# Patient Record
Sex: FEMALE | URBAN - METROPOLITAN AREA
[De-identification: names, ages, dates, MRNs, and addresses within clinical notes are randomized per-mention and may not be internally consistent; named-entity substitution may affect disease eponyms.]

---

## 2017-03-31 ENCOUNTER — TELEPHONE (OUTPATIENT)
Dept: FAMILY MEDICINE CLINIC | Facility: CLINIC | Age: 72
End: 2017-03-31

## 2017-03-31 NOTE — TELEPHONE ENCOUNTER
[address] states that she had [facility] paperwork completed in the past to have available for intermittent leave for as needed use since Destiny Lopez became ill. [address] states she has old completed forms that she will drop off for MD review.  Pt informed, -copy of previou

## 2020-05-12 ENCOUNTER — HOSPITAL ENCOUNTER (EMERGENCY)
Age: 75
Discharge: HOME | End: 2020-05-12
Attending: EMERGENCY MEDICINE
Payer: MEDICARE

## 2020-05-12 ENCOUNTER — APPOINTMENT (OUTPATIENT)
Dept: CT IMAGING | Age: 75
End: 2020-05-12
Payer: MEDICARE

## 2020-05-12 VITALS
HEIGHT: 66 IN | DIASTOLIC BLOOD PRESSURE: 78 MMHG | WEIGHT: 193 LBS | TEMPERATURE: 97.9 F | OXYGEN SATURATION: 100 % | BODY MASS INDEX: 31.02 KG/M2 | SYSTOLIC BLOOD PRESSURE: 169 MMHG | HEART RATE: 63 BPM | RESPIRATION RATE: 15 BRPM

## 2020-05-12 PROCEDURE — 99284 EMERGENCY DEPT VISIT MOD MDM: CPT

## 2020-05-12 PROCEDURE — 72125 CT NECK SPINE W/O DYE: CPT

## 2020-05-12 PROCEDURE — 6370000000 HC RX 637 (ALT 250 FOR IP): Performed by: EMERGENCY MEDICINE

## 2020-05-12 PROCEDURE — 70450 CT HEAD/BRAIN W/O DYE: CPT

## 2020-05-12 RX ORDER — BUPROPION HYDROCHLORIDE 100 MG/1
100 TABLET ORAL DAILY
COMMUNITY

## 2020-05-12 RX ORDER — FLUOXETINE 10 MG/1
1 CAPSULE ORAL DAILY
COMMUNITY

## 2020-05-12 RX ORDER — ACETAMINOPHEN 325 MG/1
650 TABLET ORAL ONCE
Status: COMPLETED | OUTPATIENT
Start: 2020-05-12 | End: 2020-05-12

## 2020-05-12 RX ORDER — PANTOPRAZOLE SODIUM 20 MG/1
40 TABLET, DELAYED RELEASE ORAL DAILY
COMMUNITY
End: 2022-09-07

## 2020-05-12 RX ORDER — BUSPIRONE HYDROCHLORIDE 10 MG/1
5 TABLET ORAL DAILY
COMMUNITY
Start: 2019-06-18 | End: 2022-09-07

## 2020-05-12 RX ORDER — MULTIVIT WITH MINERALS/LUTEIN
1 TABLET ORAL DAILY
COMMUNITY

## 2020-05-12 RX ADMIN — ACETAMINOPHEN 650 MG: 325 TABLET ORAL at 14:45

## 2020-05-12 ASSESSMENT — PAIN DESCRIPTION - FREQUENCY: FREQUENCY: CONTINUOUS

## 2020-05-12 ASSESSMENT — PAIN DESCRIPTION - LOCATION: LOCATION: NECK

## 2020-05-12 ASSESSMENT — PAIN DESCRIPTION - PROGRESSION: CLINICAL_PROGRESSION: GRADUALLY WORSENING

## 2020-05-12 ASSESSMENT — PAIN SCALES - GENERAL
PAINLEVEL_OUTOF10: 5
PAINLEVEL_OUTOF10: 9
PAINLEVEL_OUTOF10: 9

## 2020-05-12 ASSESSMENT — PAIN DESCRIPTION - DESCRIPTORS: DESCRIPTORS: SHOOTING;CONSTANT

## 2020-05-12 ASSESSMENT — PAIN DESCRIPTION - ONSET: ONSET: SUDDEN

## 2020-05-12 ASSESSMENT — PAIN DESCRIPTION - PAIN TYPE: TYPE: ACUTE PAIN

## 2020-05-12 NOTE — ED NOTES
Bed: 07  Expected date: 5/12/20  Expected time: 12:57 PM  Means of arrival: [facility]  Comments:  [facility] [address], [address], RN  05/12/20 5626

## 2020-05-12 NOTE — ED NOTES
Patient assisted to and from bathroom, did well ambulating. Patient assisted back to bed. Waiting for son to arrive to  patient.       Anstedmike Merchant, HILARY  05/12/20 4001

## 2020-05-12 NOTE — ED NOTES

## 2020-05-12 NOTE — ED NOTES
Patient assisted to get dressed. Verbal and written discharge instructions given. Telemetry monitor removed. Patient awaiting ride.       Memo Hankins RN  05/12/20 1141

## 2020-05-12 NOTE — ED PROVIDER NOTES
Take 5 mg by mouth daily    CALCIUM CARBONATE-VITAMIN D3 600-400 MG-UNIT TABS    Take 1 capsule by mouth daily    FLUOXETINE (PROZAC) 10 MG CAPSULE    Take 1 capsule by mouth daily    PANTOPRAZOLE (PROTONIX) 20 MG TABLET    Take 40 mg by mouth daily    VITAMIN E (VITAMIN E) 1000 UNITS CAPSULE    Take 1 capsule by mouth daily       ALLERGIES     Cefaclor; Ibuprofen; and Sulfa antibiotics    FAMILY HISTORY     History reviewed. No pertinent family history. SOCIAL HISTORY       Social History     Socioeconomic History    Marital status: Unknown     Spouse name: None    Number of children: None    Years of education: None    Highest education level: None   Occupational History    None   Social Needs    Financial resource strain: None    Food insecurity     Worry: None     Inability: None    Transportation needs     Medical: None     Non-medical: None   Tobacco Use    Smoking status: Never Smoker    Smokeless tobacco: Never Used   Substance and Sexual Activity    Alcohol use:  Yes     Alcohol/week: 3.0 standard drinks     Types: 3 Glasses of wine per week    Drug use: Never    Sexual activity: Not Currently   Lifestyle    Physical activity     Days per week: None     Minutes per session: None    Stress: None   Relationships    Social connections     Talks on phone: None     Gets together: None     Attends Pentecostalism service: None     Active member of club or organization: None     Attends meetings of clubs or organizations: None     Relationship status: None    Intimate partner violence     Fear of current or ex partner: None     Emotionally abused: None     Physically abused: None     Forced sexual activity: None   Other Topics Concern    None   Social History Narrative    None       SCREENINGS               PHYSICAL EXAM    (up to 7 for level 4, 8 or more for level 5)     ED Triage Vitals [05/12/20 1315]   BP Temp Temp Source Pulse Resp SpO2 Height Weight   (!) 187/80 97.7 °F (36.5 °C) Oral 67 14 the cervical spine. Imaging of the head is negative but imaging of the cervical spine showed a C1 and C2 nondisplaced fracture. Patient has no neurologic compromise. No pain in any other site. Otherwise lower mechanism injury with just a standing fall. No loss of consciousness. I spoke with neurosurgery who felt the patient could be treated as an outpatient. Patient was placed in a proper cervical collar here will be discharged with outpatient neurosurgically evaluation    MDM    CONSULTS     IP CONSULT TO NEUROSURGERY    Critical Care:   None    REASSESSMENT          PROCEDURE     Unless otherwise noted below, none     Procedures      FINAL IMPRESSION      1. Closed nondisplaced fracture of first cervical vertebra, unspecified fracture morphology, initial encounter ([facility] [address]    2. Closed nondisplaced odontoid fracture with type II morphology, initial encounter [facility])            DISPOSITION/PLAN   DISPOSITION Discharge - Pending Orders Complete 05/12/2020 04:35:27 PM        PATIENT REFERRED TO:  Kimber Branham MD  [address] [phone]    Schedule an appointment as soon as possible for a visit   call immediately for Neurosurgical follow up    Tom Goldman MD  [address] [place] [address] Dr Krista Wan [id number] [phone]    Schedule an appointment as soon as possible for a visit         DISCHARGE MEDICATIONS:  New Prescriptions    No medications on file     Controlled Substances Monitoring:     No flowsheet data found.     (Please note that portions of this note were completed with a voice recognition program.  Efforts were made to edit the dictations but occasionally words are mis-transcribed.)    Alli Vasquez MD (electronically signed)  Attending Emergency Physician            Estelle Edwards MD  05/12/20 1978 Enedina Cunningham MD  05/12/20 2966

## 2021-03-01 ENCOUNTER — HOSPITAL ENCOUNTER (EMERGENCY)
Age: 76
Discharge: HOME | End: 2021-03-01
Attending: EMERGENCY MEDICINE
Payer: MEDICARE

## 2021-03-01 ENCOUNTER — APPOINTMENT (OUTPATIENT)
Dept: GENERAL RADIOLOGY | Age: 76
End: 2021-03-01
Payer: MEDICARE

## 2021-03-01 VITALS
HEART RATE: 72 BPM | BODY MASS INDEX: 29.49 KG/M2 | TEMPERATURE: 98 F | WEIGHT: 177 LBS | RESPIRATION RATE: 16 BRPM | SYSTOLIC BLOOD PRESSURE: 171 MMHG | OXYGEN SATURATION: 98 % | HEIGHT: 65 IN | DIASTOLIC BLOOD PRESSURE: 85 MMHG

## 2021-03-01 DIAGNOSIS — M79.641 RIGHT HAND PAIN: Primary | ICD-10-CM

## 2021-03-01 PROCEDURE — 99283 EMERGENCY DEPT VISIT LOW MDM: CPT

## 2021-03-01 PROCEDURE — 73130 X-RAY EXAM OF HAND: CPT

## 2021-03-01 ASSESSMENT — PAIN SCALES - GENERAL: PAINLEVEL_OUTOF10: 1

## 2021-03-01 ASSESSMENT — PAIN DESCRIPTION - ORIENTATION: ORIENTATION: RIGHT

## 2021-03-01 NOTE — ED PROVIDER NOTES
[facility] Emergency Department    CHIEF COMPLAINT  Fall (pt tripped and fell walking her dog on Saturday. pt c/o right hand pain. denies any other injuries) and Hand Pain      SHARED SERVICE VISIT:  I have seen and evaluated this patient with my supervising physician, Dr. Sandy Scott. HISTORY OF PRESENT ILLNESS  Dwight Bradford is a 70 y.o. female who is right-handed dominant who presents to the ED complaining of \"aching\" 5/10 right hand pain status post she experienced a mechanical trip and fall while walking her dog on Saturday. Denies head injury, loss of consciousness, numbness or tingling in the right upper extremity, or any prodrome including chest pain, shortness of breath, dizziness, presyncope, nausea, vomiting or sweats. She is able to get up off the ground and ambulate back home. No other complaints, modifying factors or associated symptoms. Nursing notes reviewed. Past Medical History:   Diagnosis Date    Depression     GERD (gastroesophageal reflux disease)      Past Surgical History:   Procedure Laterality Date    STOMACH SURGERY      TONSILLECTOMY       No family history on file. Social History     Socioeconomic History    Marital status:      Spouse name: Not on file    Number of children: Not on file    Years of education: Not on file    Highest education level: Not on file   Occupational History    Not on file   Social Needs    Financial resource strain: Not on file    Food insecurity     Worry: Not on file     Inability: Not on file    Transportation needs     Medical: Not on file     Non-medical: Not on file   Tobacco Use    Smoking status: Never Smoker    Smokeless tobacco: Never Used   Substance and Sexual Activity    Alcohol use:  Yes     Alcohol/week: 3.0 standard drinks     Types: 3 Glasses of wine per week    Drug use: Never    Sexual activity: Not Currently   Lifestyle    Physical activity     Days per week: Not on file Minutes per session: Not on file    Stress: Not on file   Relationships    Social connections     Talks on phone: Not on file     Gets together: Not on file     Attends Spiritism service: Not on file     Active member of club or organization: Not on file     Attends meetings of clubs or organizations: Not on file     Relationship status: Not on file    Intimate partner violence     Fear of current or ex partner: Not on file     Emotionally abused: Not on file     Physically abused: Not on file     Forced sexual activity: Not on file   Other Topics Concern    Not on file   Social History Narrative    Not on file     No current facility-administered medications for this encounter. Current Outpatient Medications   Medication Sig Dispense Refill    pantoprazole (PROTONIX) 20 MG tablet Take 40 mg by mouth daily      buPROPion (WELLBUTRIN) 100 MG tablet Take 100 mg by mouth daily      busPIRone (BUSPAR) 10 MG tablet Take 5 mg by mouth daily      calcium carbonate-vitamin D3 600-400 MG-UNIT TABS Take 1 capsule by mouth daily      vitamin E (VITAMIN E) 1000 units capsule Take 1 capsule by mouth daily      FLUoxetine (PROZAC) 10 MG capsule Take 1 capsule by mouth daily       Allergies   Allergen Reactions    Cefaclor Itching     itching      Ibuprofen      Can not take due to ulcer    Sulfa Antibiotics Rash     Unknown child         REVIEW OF SYSTEMS  6 systems reviewed, pertinent positives per HPI otherwise noted to be negative    PHYSICAL EXAM  BP (!) 141/66   Pulse 70   Temp 98 °F (36.7 °C) (Oral)   Resp 19   Ht 5' 5\" (1.651 m)   Wt 177 lb (80.3 kg)   SpO2 98%   BMI 29.45 kg/m²   GENERAL APPEARANCE: Awake and alert. Cooperative. No acute distress. HEAD: Normocephalic. Atraumatic. No jackson sign. EYES: PERRL. EOM's grossly intact. No raccoons eyes. ENT: Mucous membranes are moist.  No hemotympanum. NECK: Supple. Normal ROM. There is no midline cervical tenderness upon palpation. CHEST: Equal symmetric chest rise. LUNGS: Breathing is unlabored. Speaking comfortably in full sentences. Abdomen: Nondistended  Back: No midline thoracic. EXTREMITIES: Patient moves bilateral lower and left upper extremity freely and with good power. There is mild limited ROM of right hand with no edema noted. There is no right snuffbox tenderness upon palpation of right wrist. No acute deformities. + Equal radial pulses bilaterally with brisk cap refill <2 seconds. Bilateral upper extremities are warm, pink, dry and well-perfused.  + Bilateral upper extremity intactness with full sensation. SKIN: Warm and dry. There is a small abrasion noted to the dorsum of the right ring finger. NEUROLOGICAL: Alert and oriented. Strength is 5/5 in all extremities and sensation is intact. RADIOLOGY  No results found. ED COURSE  Patient did not require analgesia while in the emergency department/refused. Xr Hand Right (min 3 Views)    Result Date: 3/1/2021  EXAMINATION: THREE XRAY VIEWS OF THE RIGHT HAND 3/1/2021 10:50 am COMPARISON: None. HISTORY: ORDERING SYSTEM PROVIDED HISTORY: fall TECHNOLOGIST PROVIDED HISTORY: Reason for exam:->fall Reason for Exam: FALL; RIGHT HAND PAIN Acuity: Acute Type of Exam: Initial FINDINGS: There is no acute osseous abnormality. There is diffuse degenerative joint disease. The surrounding soft tissues are unremarkable. No acute osseous or soft tissue abnormality. A discussion was had with Mrs. Abdi regarding right hand pain and intention to discharge. Risk management discussed and shared decision making had with patient and/or surrogate. All questions were answered. Patient will follow up with Her PCP for further evaluation/treatment. Patient will return to ED for new/worsening symptoms. Patient was not sent home with prescriptions.     KALE Patient presents emergency department with right hand pain status post mechanical fall. Alternative diagnoses are less likely based on history and physical. Considered laceration, contusion, fracture, sprain/strain, dislocation, vascular injury, among others but less likely based on history physical.    Imaging feels no acute fracture and there is no snuffbox tenderness, therefore:    My attending physician and I feel that the patient is both safe and up discharged home with outpatient follow-up with her PCP in the next 1-2 days for reevaluation. Patient will continue to utilize RICE and will take OTC pain medications as needed for pain. She will utilize an Ace wrap as the compression part of RICE to decrease swelling., ensuring that she removes the Ace wrap while sleeping. Patient is instructed to keep the hand clean and dry and return to the emergency department for new or worsening signs including, but not limited to, numbness, tingling, loss of sensation, weakness to the right upper extremity, blue fingers, inability to utilize your right hand, red streaking up your arm, pus drainage, developing fever, chills, sweats or other concerns. Patient verbalized understanding and is agreeable with plan for discharge and follow-up. Clinical impression  Right hand pain          DISPOSITION  Patient was discharged to home in good condition.            KRAY Thornton - ORLIN  03/02/21 5863

## 2021-03-01 NOTE — ED NOTES

## 2021-03-02 ENCOUNTER — IMMUNIZATION (OUTPATIENT)
Dept: PRIMARY CARE CLINIC | Age: 76
End: 2021-03-02
Payer: MEDICARE

## 2021-03-02 PROCEDURE — 91301 COVID-19, MODERNA VACCINE 100MCG/0.5ML DOSE: CPT | Performed by: FAMILY MEDICINE

## 2021-03-02 PROCEDURE — 0011A PR IMM ADMN SARSCOV2 100 MCG/0.5 ML 1ST DOSE: CPT | Performed by: FAMILY MEDICINE

## 2021-03-30 ENCOUNTER — IMMUNIZATION (OUTPATIENT)
Dept: PRIMARY CARE CLINIC | Age: 76
End: 2021-03-30
Payer: MEDICARE

## 2021-03-30 PROCEDURE — 0012A COVID-19, MODERNA VACCINE 100MCG/0.5ML DOSE: CPT | Performed by: FAMILY MEDICINE

## 2021-03-30 PROCEDURE — 91301 COVID-19, MODERNA VACCINE 100MCG/0.5ML DOSE: CPT | Performed by: FAMILY MEDICINE

## 2021-06-06 ENCOUNTER — HOSPITAL ENCOUNTER (EMERGENCY)
Age: 76
Discharge: HOME | End: 2021-06-06
Payer: MEDICARE

## 2021-06-06 ENCOUNTER — APPOINTMENT (OUTPATIENT)
Dept: GENERAL RADIOLOGY | Age: 76
End: 2021-06-06
Payer: MEDICARE

## 2021-06-06 VITALS
HEART RATE: 62 BPM | DIASTOLIC BLOOD PRESSURE: 81 MMHG | TEMPERATURE: 97.9 F | BODY MASS INDEX: 29.45 KG/M2 | WEIGHT: 177 LBS | OXYGEN SATURATION: 98 % | RESPIRATION RATE: 18 BRPM | SYSTOLIC BLOOD PRESSURE: 142 MMHG

## 2021-06-06 DIAGNOSIS — M25.441 SWELLING OF FINGER JOINT OF RIGHT HAND: ICD-10-CM

## 2021-06-06 DIAGNOSIS — M25.541 PAIN INVOLVING JOINT OF FINGER OF RIGHT HAND: Primary | ICD-10-CM

## 2021-06-06 PROCEDURE — 99283 EMERGENCY DEPT VISIT LOW MDM: CPT

## 2021-06-06 PROCEDURE — 73140 X-RAY EXAM OF FINGER(S): CPT

## 2021-06-06 ASSESSMENT — PAIN SCALES - GENERAL
PAINLEVEL_OUTOF10: 2
PAINLEVEL_OUTOF10: 2

## 2021-06-06 NOTE — ED PROVIDER NOTES
[facility]  ED  eMERGENCY dEPARTMENT eNCOUnter        Pt Name: Zelda Snell  MRN: [id number]  [place] 1945  Date of evaluation: 6/6/2021  Provider: Radha Bonds PA-C  PCP: Radha Higginbotham MD, MD  ED Attending: Vern Natarajan MD    Patient was not seen by the ED attending  History is provided by the patient    CHIEF COMPLAINT:  Other (right middle finger swollen for 3 months)      HISTORY OF PRESENT ILLNESS:  Zelda Snell is a 70 y.o. female who presents to the ED via private vehicle with complaints of swelling right middle finger. Patient states she fell on 3/1/2020. She injured her right hand at that time. She was seen here in the ED. She had x-rays done and states they were negative. Since then she is continued to have some mild pain at the PIP joint of her right middle finger. Patient reports she recently made a long drive out to [place] and back and noticed the pain/stiffness and swelling when gripping the steering wheel. She decided to get it looked at today. She has not sought follow-up with primary care or orthopedics. No new injury. No fevers and patient is feeling well otherwise. She is right-hand dominant. No other complaints, modifying factors or associated symptoms. Nursing notes reviewed. Past Medical History:   Diagnosis Date    Depression     GERD (gastroesophageal reflux disease)      Past Surgical History:   Procedure Laterality Date    STOMACH SURGERY      TONSILLECTOMY       History reviewed. No pertinent family history. Social History     Socioeconomic History    Marital status:      Spouse name: Not on file    Number of children: Not on file    Years of education: Not on file    Highest education level: Not on file   Occupational History    Not on file   Tobacco Use    Smoking status: Never Smoker    Smokeless tobacco: Never Used   Vaping Use    Vaping Use: Never used   Substance and Sexual Activity    Alcohol use:  Yes     Alcohol/week: Pulse 64   Temp 97.9 °F (36.6 °C) (Oral)   Resp 18   Wt 177 lb (80.3 kg)   SpO2 98%   BMI 29.45 kg/m²   CONSTITUTIONAL: Awake and alert. Well-developed. Well-nourished. Non-toxic. Cooperative. No acute distress. HENT: Normocephalic. Atraumatic. External ears normal, without discharge. Nose normal. Mucous membranes moist.  EYES: Conjunctiva non-injected. No scleral icterus. PERRL. EOM's grossly intact. NECK: Supple. Normal ROM. CARDIOVASCULAR: Normal heart rate. Intact distal pulses. PULMONARY/CHEST WALL: Breathing is unlabored. Equal, symmetric chest rise. Speaking comfortably in full sentences. ABDOMEN: Nondistended  MUSKULOSKELETAL: Right hand: Middle finger: Swelling to PIP joint with faint inflammation. No significant pain to palpate. Normal ROM. No acute deformities. SKIN: Warm and dry. Skin intact. NEUROLOGICAL: Alert and oriented x 3. Strength is 5/5 in all extremities and sensation is intact. PSYCHIATRIC: Normal affect    Labs:    None    RADIOLOGY:    All x-ray studies are viewed/reviewed by me. Formal interpretations per the radiologist are as follows:    XR FINGER RIGHT (MIN 2 VIEWS)    Result Date: 6/6/2021  EXAMINATION: THREE X-RAY VIEWS OF THE RIGHT MIDDLE FINGER 6/6/2021 3:59 pm COMPARISON: Radiographs right hand 03/01/2021 HISTORY: ORDERING SYSTEM PROVIDED HISTORY: injury middle finger TECHNOLOGIST PROVIDED HISTORY: Reason for exam:-> injury middle finger Reason for Exam: Other (right middle finger swollen for 3 months) FINDINGS: 1. No fracture or dislocation. 2. There appear to be bone erosions lateral and medial at the head of the proximal phalanx right middle finger. Several other bone erosions are demonstrated at different levels of the right hand on prior series 03/01/2021. 3. Focal acute soft tissue edema predominantly PIP joint right middle finger. 4. No retained radiopaque foreign body. 1. No fracture or dislocation.  2. Acute soft tissue edema evident about the PIP joint and bone erosion suggestive of underlying synovial inflammatory process such as gout. Infectious processes consideration well. 3. No retained radiopaque foreign body. Follow-up imaging recommended if pain persists or worsens following conservative management. ED COURSE/MDM:  Patient was given the following medications: None    I have evaluated this patient here in the ED. Patient is here with old injury (3/2020) to the right hand but persistent discomfort and swelling at the PIP joint of the middle finger. She is right-hand dominant. She does have the get it looked at today. Nothing new in terms of injury today. She does not have an orthopedist.  X-rays of the right finger show no fracture or dislocation. There is acute soft tissue edema evident about the PIP joint and bone erosions suggestive of underlying synovial inflammation such as gout. Infectious process considered as well. No retained radiopaque foreign body. Follow-up recommended. I made patient aware of the findings. I will give her orthopedic referral for follow-up. I do not necessarily see an indication for urgent labs are as this has been going on for 3 months now. I think close orthopedic follow-up is appropriate and use of over-the-counter analgesics as needed for pain is appropriate as well. I estimate there is LOW risk for ACUTE FRACTURE, COMPARTMENT SYNDROME, DEEP VENOUS THROMBOSIS, SEPTIC ARTHRITIS, TENDON OR NEUROVASCULAR INJURY, thus I consider the discharge disposition reasonable. Attila Cervantes and I have discussed the diagnosis and risks, and we agree with discharging home to follow-up with their primary doctor or the referral orthopedist. We also discussed returning to the Emergency Department immediately if new or worsening symptoms occur. We have discussed the symptoms which are most concerning (e.g., changing or worsening pain, numbness, weakness) that necessitate immediate return.     CLINICAL IMPRESSION:  1. Pain involving joint of finger of right hand    2. Swelling of finger joint of right hand        Blood pressure (!) 176/80, pulse 64, temperature 97.9 °F (36.6 °C), temperature source Oral, resp. rate 18, weight 177 lb (80.3 kg), SpO2 98 %. PATIENT REFERRED TO:  Bertrand Flores MD  [address] [address]  [phone]              DISPOSITION  Patient was discharged to home in good condition.           Luis Rodarte  06/06/21 6835

## 2021-06-06 NOTE — ED NOTES
Pt instructed to follow up with [facility]. Assessed per [facility].      Devora Skinner LPN  12/75/18 8609

## 2021-06-22 ENCOUNTER — TELEPHONE (OUTPATIENT)
Dept: ORTHOPEDIC SURGERY | Age: 76
End: 2021-06-22

## 2021-10-19 ENCOUNTER — HOSPITAL ENCOUNTER (EMERGENCY)
Age: 76
Discharge: HOME | End: 2021-10-19
Payer: MEDICARE

## 2021-10-19 ENCOUNTER — APPOINTMENT (OUTPATIENT)
Dept: GENERAL RADIOLOGY | Age: 76
End: 2021-10-19
Payer: MEDICARE

## 2021-10-19 VITALS
OXYGEN SATURATION: 97 % | RESPIRATION RATE: 14 BRPM | WEIGHT: 170 LBS | HEIGHT: 66 IN | DIASTOLIC BLOOD PRESSURE: 84 MMHG | TEMPERATURE: 98 F | HEART RATE: 60 BPM | BODY MASS INDEX: 27.32 KG/M2 | SYSTOLIC BLOOD PRESSURE: 149 MMHG

## 2021-10-19 DIAGNOSIS — R07.89 LEFT-SIDED CHEST WALL PAIN: Primary | ICD-10-CM

## 2021-10-19 LAB
A/G RATIO: 1.6 (ref 1.1–2.2)
ALBUMIN SERPL-MCNC: 4.4 G/DL (ref 3.4–5)
ALP BLD-CCNC: 100 U/L (ref 40–129)
ALT SERPL-CCNC: 12 U/L (ref 10–40)
ANION GAP SERPL CALCULATED.3IONS-SCNC: 11 MMOL/L (ref 3–16)
AST SERPL-CCNC: 18 U/L (ref 15–37)
BASOPHILS ABSOLUTE: 0.1 K/UL (ref 0–0.2)
BASOPHILS RELATIVE PERCENT: 1.1 %
BILIRUB SERPL-MCNC: 0.6 MG/DL (ref 0–1)
BUN BLDV-MCNC: 14 MG/DL (ref 7–20)
CALCIUM SERPL-MCNC: 9.8 MG/DL (ref 8.3–10.6)
CHLORIDE BLD-SCNC: 97 MMOL/L (ref 99–110)
CO2: 26 MMOL/L (ref 21–32)
CREAT SERPL-MCNC: 0.7 MG/DL (ref 0.6–1.2)
EKG ATRIAL RATE: 57 BPM
EKG DIAGNOSIS: NORMAL
EKG P AXIS: 54 DEGREES
EKG P-R INTERVAL: 146 MS
EKG Q-T INTERVAL: 458 MS
EKG QRS DURATION: 94 MS
EKG QTC CALCULATION (BAZETT): 445 MS
EKG R AXIS: -13 DEGREES
EKG T AXIS: 43 DEGREES
EKG VENTRICULAR RATE: 57 BPM
EOSINOPHILS ABSOLUTE: 0.1 K/UL (ref 0–0.6)
EOSINOPHILS RELATIVE PERCENT: 2 %
GFR AFRICAN AMERICAN: >60
GFR NON-AFRICAN AMERICAN: >60
GLOBULIN: 2.7 G/DL
GLUCOSE BLD-MCNC: 100 MG/DL (ref 70–99)
HCT VFR BLD CALC: 35.6 % (ref 36–48)
HEMOGLOBIN: 11.9 G/DL (ref 12–16)
LYMPHOCYTES ABSOLUTE: 1.7 K/UL (ref 1–5.1)
LYMPHOCYTES RELATIVE PERCENT: 26.4 %
MCH RBC QN AUTO: 31.3 PG (ref 26–34)
MCHC RBC AUTO-ENTMCNC: 33.5 G/DL (ref 31–36)
MCV RBC AUTO: 93.6 FL (ref 80–100)
MONOCYTES ABSOLUTE: 0.9 K/UL (ref 0–1.3)
MONOCYTES RELATIVE PERCENT: 13.5 %
NEUTROPHILS ABSOLUTE: 3.7 K/UL (ref 1.7–7.7)
NEUTROPHILS RELATIVE PERCENT: 57 %
PDW BLD-RTO: 14.3 % (ref 12.4–15.4)
PLATELET # BLD: 301 K/UL (ref 135–450)
PMV BLD AUTO: 7.3 FL (ref 5–10.5)
POTASSIUM SERPL-SCNC: 3.8 MMOL/L (ref 3.5–5.1)
RBC # BLD: 3.8 M/UL (ref 4–5.2)
SODIUM BLD-SCNC: 134 MMOL/L (ref 136–145)
TOTAL PROTEIN: 7.1 G/DL (ref 6.4–8.2)
TROPONIN: <0.01 NG/ML
WBC # BLD: 6.5 K/UL (ref 4–11)

## 2021-10-19 PROCEDURE — 99283 EMERGENCY DEPT VISIT LOW MDM: CPT

## 2021-10-19 PROCEDURE — 93010 ELECTROCARDIOGRAM REPORT: CPT | Performed by: INTERNAL MEDICINE

## 2021-10-19 PROCEDURE — 71045 X-RAY EXAM CHEST 1 VIEW: CPT

## 2021-10-19 PROCEDURE — 93005 ELECTROCARDIOGRAM TRACING: CPT | Performed by: PHYSICIAN ASSISTANT

## 2021-10-19 PROCEDURE — 85025 COMPLETE CBC W/AUTO DIFF WBC: CPT

## 2021-10-19 PROCEDURE — 80053 COMPREHEN METABOLIC PANEL: CPT

## 2021-10-19 PROCEDURE — 84484 ASSAY OF TROPONIN QUANT: CPT

## 2021-10-19 RX ORDER — ASPIRIN 81 MG/1
324 TABLET, CHEWABLE ORAL ONCE
Status: DISCONTINUED | OUTPATIENT
Start: 2021-10-19 | End: 2021-10-19 | Stop reason: HOSPADM

## 2021-10-19 ASSESSMENT — PAIN DESCRIPTION - ORIENTATION: ORIENTATION: LEFT

## 2021-10-19 ASSESSMENT — PAIN DESCRIPTION - PAIN TYPE: TYPE: ACUTE PAIN

## 2021-10-19 ASSESSMENT — PAIN DESCRIPTION - LOCATION: LOCATION: BREAST

## 2021-10-19 ASSESSMENT — PAIN SCALES - GENERAL: PAINLEVEL_OUTOF10: 2

## 2021-10-19 ASSESSMENT — PAIN DESCRIPTION - DESCRIPTORS: DESCRIPTORS: DISCOMFORT

## 2021-10-19 NOTE — ED PROVIDER NOTES
I did not perform an evaluation of Pedro Garcia but was asked to review her EKG. EKG interpreted by myself.    Rate: 57  Rhythm: sinus bradycardia   Axis: normal  Intervals: within normal limits  ST Segments: no acute abnormality  T waves: inversion V1  Comparison: sinus bradycardia   Impression: sinus bradycardia with possible LAE     Mat Fuller MD  10/19/21 9607

## 2021-10-19 NOTE — ED PROVIDER NOTES
[facility] Emergency Department    CHIEF COMPLAINT  Breast Pain (pain under left breast an hour ago that lasted 10 mins. states this happened a week ago as well. Pt states pain is almost gone, a \"tinge\" )      SHARED SERVICE VISIT  Evaluated by MARKOS. My supervising physician was available for consultation. HISTORY OF PRESENT ILLNESS  Maribel Tidwell is a 70 y.o. female who presents to the ED complaining of's several hour history of left-sided chest pain. Patient drove herself in for evaluation. She reports that symptoms began while doing a crossword puzzle. Sharp stabbing left-sided chest wall pain that lasted approximately 10 minutes prior to resolving. Reports that she does still have some mild discomfort there. Rated at a 2 out of 10 currently. Reports similar episode approximately 1 week ago. She denies any shortness of breath or pain with deep inspiration. No cough or congestion. Denies fevers chills. No headache, lightheadedness, dizziness confusion. She denies any neck, arm, jaw or back pain. No unilateral calf pain, redness or swelling. No other complaints, modifying factors or associated symptoms. Nursing notes reviewed. Past Medical History:   Diagnosis Date    Depression     GERD (gastroesophageal reflux disease)      Past Surgical History:   Procedure Laterality Date    STOMACH SURGERY      TONSILLECTOMY       History reviewed. No pertinent family history. Social History     Socioeconomic History    Marital status:      Spouse name: Not on file    Number of children: Not on file    Years of education: Not on file    Highest education level: Not on file   Occupational History    Not on file   Tobacco Use    Smoking status: Never Smoker    Smokeless tobacco: Never Used   Vaping Use    Vaping Use: Never used   Substance and Sexual Activity    Alcohol use:  Yes     Alcohol/week: 3.0 standard drinks     Types: 3 Glasses of wine per week otherwise noted to be negative    PHYSICAL EXAM  BP (!) 149/84   Pulse 60   Temp 98 °F (36.7 °C) (Oral)   Resp 14   Ht 5' 6\" (1.676 m)   Wt 170 lb (77.1 kg)   SpO2 97%   BMI 27.44 kg/m²   GENERAL APPEARANCE: Awake and alert. Cooperative. No acute distress. HEAD: Normocephalic. Atraumatic. EYES: PERRL. EOM's grossly intact. ENT: Mucous membranes are moist.   NECK: Supple. No JVD. No tracheal tenderness or deviation. No crepitus. HEART: RRR. No murmurs. Patient localizes chest wall tenderness to spot in lower left anterior chest wall which is reproducible. No paradoxical motion. No subcutaneous emphysema. LUNGS: Respirations unlabored. CTAB. Good air exchange. Speaking comfortably in full sentences. No wheezing, rhonchi, rales. ABDOMEN: Soft. Non-distended. Non-tender. No guarding or rebound. No midline pulsatile mass. EXTREMITIES: No peripheral edema. No unilateral calf pain, redness or swelling. Moves all extremities equally. All extremities neurovascularly intact. SKIN: Warm and dry. No acute rashes. NEUROLOGICAL: Alert and oriented. CN's 2-12 intact. No gross facial drooping. Strength 5/5, sensation intact. PSYCHIATRIC: Normal mood and affect. RADIOLOGY  XR CHEST PORTABLE    Result Date: 10/19/2021  EXAMINATION: ONE XRAY VIEW OF THE CHEST 10/19/2021 12:39 am COMPARISON: None. HISTORY: ORDERING SYSTEM PROVIDED HISTORY: cp TECHNOLOGIST PROVIDED HISTORY: Reason for exam:->cp Reason for Exam: pain under left breast an hour ago that lasted 10 mins. states this happened a week ago as well. Pt states pain is almost gone, a \"tinge\" Acuity: Acute Type of Exam: Ongoing FINDINGS: Heart size and configuration are within normal limits. Thoracic aorta is mildly elongated and tortuous. The lungs are clear. No pneumothorax or pleural fluid. No acute bone finding. No acute cardiopulmonary disease. ED COURSE  Patient received aspirin for pain, with good relief. Triage vitals stable. CBC without leukocytosis or anemia. CMP unremarkable. Troponin less than 0.01. Stable portable chest x-ray. EKG consistent with sinus bradycardia. No evidence for ischemic change. Again based on symptomology and physical exam this does appear consistent with musculoskeletal chest wall pain. Patient does feel comfortable with discharge home and continued outpatient follow-up. We have discussed return precautions otherwise and she is in agreement and comfortable at discharge. Ms. Denia Garcia discussion was had with Mrs. Abdi regarding her chest wall pain, ED findings and recommendations for follow-up. Risk management discussed and shared decision making had with patient and/or surrogate. All questions were answered. Patient will follow up with the rheumatology for further evaluation/treatment. All questions answered. Patient will return to ED for new/worsening symptoms. Patient was informed to continue home medications as prescribed.     MDM  Results for orders placed or performed during the hospital encounter of 10/19/21   CBC auto differential   Result Value Ref Range    WBC 6.5 4.0 - 11.0 K/uL    RBC 3.80 (L) 4.00 - 5.20 M/uL    Hemoglobin 11.9 (L) 12.0 - 16.0 g/dL    Hematocrit 35.6 (L) 36.0 - 48.0 %    MCV 93.6 80.0 - 100.0 fL    MCH 31.3 26.0 - 34.0 pg    MCHC 33.5 31.0 - 36.0 g/dL    RDW 14.3 12.4 - 15.4 %    Platelets 618 028 - 754 K/uL    MPV 7.3 5.0 - 10.5 fL    Neutrophils % 57.0 %    Lymphocytes % 26.4 %    Monocytes % 13.5 %    Eosinophils % 2.0 %    Basophils % 1.1 %    Neutrophils Absolute 3.7 1.7 - 7.7 K/uL    Lymphocytes Absolute 1.7 1.0 - 5.1 K/uL    Monocytes Absolute 0.9 0.0 - 1.3 K/uL    Eosinophils Absolute 0.1 0.0 - 0.6 K/uL    Basophils Absolute 0.1 0.0 - 0.2 K/uL   Comprehensive metabolic panel   Result Value Ref Range    Sodium 134 (L) 136 - 145 mmol/L    Potassium 3.8 3.5 - 5.1 mmol/L    Chloride 97 (L) 99 - 110 mmol/L    CO2 26 21 - 32 mmol/L    Anion Gap 11 3 - 16    Glucose 100 (H) 70 - 99 mg/dL    BUN 14 7 - 20 mg/dL    CREATININE 0.7 0.6 - 1.2 mg/dL    GFR Non-African American >60 >60    GFR African American >60 >60    Calcium 9.8 8.3 - 10.6 mg/dL    Total Protein 7.1 6.4 - 8.2 g/dL    Albumin 4.4 3.4 - 5.0 g/dL    Albumin/Globulin Ratio 1.6 1.1 - 2.2    Total Bilirubin 0.6 0.0 - 1.0 mg/dL    Alkaline Phosphatase 100 40 - 129 U/L    ALT 12 10 - 40 U/L    AST 18 15 - 37 U/L    Globulin 2.7 Not Established g/dL   Troponin   Result Value Ref Range    Troponin <0.01 <0.01 ng/mL   EKG 12 Lead   Result Value Ref Range    Ventricular Rate 57 BPM    Atrial Rate 57 BPM    P-R Interval 146 ms    QRS Duration 94 ms    Q-T Interval 458 ms    QTc Calculation (Bazett) 445 ms    P Axis 54 degrees    R Axis -13 degrees    T Axis 43 degrees    Diagnosis       Sinus bradycardiaPossible Left atrial enlargementBorderline ECGNo previous ECGs available     I estimate there is LOW risk for ACUTE CORONARY SYNDROME, INTRACRANIAL HEMORRHAGE, MALIGNANT DYSRHYTHMIA or HYPERTENSION, PULMONARY EMBOLISM, SEPSIS, SUBARACHNOID HEMORRHAGE, SUBDURAL HEMATOMA, STROKE, or THORACIC AORTIC DISSECTION, thus I consider the discharge disposition reasonable. Abhijit Addison and I have discussed the diagnosis and risks, and we agree with discharging home to follow-up with their primary doctor. We also discussed returning to the Emergency Department immediately if new or worsening symptoms occur. We have discussed the symptoms which are most concerning (e.g., bloody sputum, fever, worsening pain or shortness of breath, vomiting, weakness) that necessitate immediate return. Final Impression  1. Left-sided chest wall pain      Blood pressure (!) 149/84, pulse 60, temperature 98 °F (36.7 °C), temperature source Oral, resp. rate 14, height 5' 6\" (1.676 m), weight 170 lb (77.1 kg), SpO2 97 %. DISPOSITION  Patient was discharged to home in good condition.           Sylvia Flores, [address]  10/19/21 9545

## 2022-03-07 ENCOUNTER — HOSPITAL ENCOUNTER (EMERGENCY)
Age: 77
Discharge: HOME | End: 2022-03-07
Payer: MEDICARE

## 2022-03-07 VITALS
OXYGEN SATURATION: 99 % | TEMPERATURE: 98.2 F | WEIGHT: 170 LBS | DIASTOLIC BLOOD PRESSURE: 79 MMHG | SYSTOLIC BLOOD PRESSURE: 147 MMHG | RESPIRATION RATE: 18 BRPM | HEART RATE: 66 BPM | HEIGHT: 64 IN | BODY MASS INDEX: 29.02 KG/M2

## 2022-03-07 DIAGNOSIS — R10.9 ABDOMINAL DISCOMFORT: ICD-10-CM

## 2022-03-07 DIAGNOSIS — R19.7 NAUSEA VOMITING AND DIARRHEA: Primary | ICD-10-CM

## 2022-03-07 DIAGNOSIS — R11.2 NAUSEA VOMITING AND DIARRHEA: Primary | ICD-10-CM

## 2022-03-07 LAB
A/G RATIO: 2.1 (ref 1.1–2.2)
ALBUMIN SERPL-MCNC: 4.5 G/DL (ref 3.4–5)
ALP BLD-CCNC: 123 U/L (ref 40–129)
ALT SERPL-CCNC: 13 U/L (ref 10–40)
AMORPHOUS: ABNORMAL /HPF
ANION GAP SERPL CALCULATED.3IONS-SCNC: 14 MMOL/L (ref 3–16)
AST SERPL-CCNC: 18 U/L (ref 15–37)
BACTERIA: ABNORMAL /HPF
BASOPHILS ABSOLUTE: 0 K/UL (ref 0–0.2)
BASOPHILS RELATIVE PERCENT: 0.5 %
BILIRUB SERPL-MCNC: 0.7 MG/DL (ref 0–1)
BILIRUBIN URINE: NEGATIVE
BLOOD, URINE: ABNORMAL
BUN BLDV-MCNC: 15 MG/DL (ref 7–20)
CALCIUM SERPL-MCNC: 9.4 MG/DL (ref 8.3–10.6)
CHLORIDE BLD-SCNC: 94 MMOL/L (ref 99–110)
CLARITY: ABNORMAL
CO2: 24 MMOL/L (ref 21–32)
COLOR: YELLOW
CREAT SERPL-MCNC: <0.5 MG/DL (ref 0.6–1.2)
EOSINOPHILS ABSOLUTE: 0 K/UL (ref 0–0.6)
EOSINOPHILS RELATIVE PERCENT: 0.2 %
EPITHELIAL CELLS, UA: ABNORMAL /HPF (ref 0–5)
GFR AFRICAN AMERICAN: >60
GFR NON-AFRICAN AMERICAN: >60
GLUCOSE BLD-MCNC: 141 MG/DL (ref 70–99)
GLUCOSE URINE: NEGATIVE MG/DL
HCT VFR BLD CALC: 36.2 % (ref 36–48)
HEMOGLOBIN: 12 G/DL (ref 12–16)
KETONES, URINE: NEGATIVE MG/DL
LEUKOCYTE ESTERASE, URINE: NEGATIVE
LIPASE: 18 U/L (ref 13–60)
LYMPHOCYTES ABSOLUTE: 0.7 K/UL (ref 1–5.1)
LYMPHOCYTES RELATIVE PERCENT: 9.1 %
MCH RBC QN AUTO: 31.2 PG (ref 26–34)
MCHC RBC AUTO-ENTMCNC: 33.2 G/DL (ref 31–36)
MCV RBC AUTO: 93.9 FL (ref 80–100)
MICROSCOPIC EXAMINATION: YES
MONOCYTES ABSOLUTE: 0.4 K/UL (ref 0–1.3)
MONOCYTES RELATIVE PERCENT: 5.2 %
MUCUS: ABNORMAL /LPF
NEUTROPHILS ABSOLUTE: 6.9 K/UL (ref 1.7–7.7)
NEUTROPHILS RELATIVE PERCENT: 85 %
NITRITE, URINE: NEGATIVE
PDW BLD-RTO: 13.4 % (ref 12.4–15.4)
PH UA: 7 (ref 5–8)
PLATELET # BLD: 329 K/UL (ref 135–450)
PMV BLD AUTO: 7.1 FL (ref 5–10.5)
POTASSIUM REFLEX MAGNESIUM: 4.2 MMOL/L (ref 3.5–5.1)
PROTEIN UA: NEGATIVE MG/DL
RBC # BLD: 3.85 M/UL (ref 4–5.2)
RBC UA: ABNORMAL /HPF (ref 0–4)
SODIUM BLD-SCNC: 132 MMOL/L (ref 136–145)
SPECIFIC GRAVITY UA: 1.02 (ref 1–1.03)
TOTAL PROTEIN: 6.6 G/DL (ref 6.4–8.2)
URINE TYPE: ABNORMAL
UROBILINOGEN, URINE: 0.2 E.U./DL
WBC # BLD: 8.1 K/UL (ref 4–11)
WBC UA: ABNORMAL /HPF (ref 0–5)

## 2022-03-07 PROCEDURE — 6360000002 HC RX W HCPCS: Performed by: NURSE PRACTITIONER

## 2022-03-07 PROCEDURE — 80053 COMPREHEN METABOLIC PANEL: CPT

## 2022-03-07 PROCEDURE — 83690 ASSAY OF LIPASE: CPT

## 2022-03-07 PROCEDURE — 96374 THER/PROPH/DIAG INJ IV PUSH: CPT

## 2022-03-07 PROCEDURE — 81001 URINALYSIS AUTO W/SCOPE: CPT

## 2022-03-07 PROCEDURE — 99283 EMERGENCY DEPT VISIT LOW MDM: CPT

## 2022-03-07 PROCEDURE — 2580000003 HC RX 258: Performed by: NURSE PRACTITIONER

## 2022-03-07 PROCEDURE — 85025 COMPLETE CBC W/AUTO DIFF WBC: CPT

## 2022-03-07 RX ORDER — ONDANSETRON 4 MG/1
4 TABLET, ORALLY DISINTEGRATING ORAL EVERY 8 HOURS PRN
Qty: 20 TABLET | Refills: 0 | Status: SHIPPED | OUTPATIENT
Start: 2022-03-07

## 2022-03-07 RX ORDER — ONDANSETRON 2 MG/ML
4 INJECTION INTRAMUSCULAR; INTRAVENOUS EVERY 30 MIN PRN
Status: DISCONTINUED | OUTPATIENT
Start: 2022-03-07 | End: 2022-03-07 | Stop reason: HOSPADM

## 2022-03-07 RX ORDER — 0.9 % SODIUM CHLORIDE 0.9 %
1000 INTRAVENOUS SOLUTION INTRAVENOUS ONCE
Status: COMPLETED | OUTPATIENT
Start: 2022-03-07 | End: 2022-03-07

## 2022-03-07 RX ADMIN — ONDANSETRON 4 MG: 2 INJECTION INTRAMUSCULAR; INTRAVENOUS at 17:26

## 2022-03-07 RX ADMIN — SODIUM CHLORIDE 1000 ML: 9 INJECTION, SOLUTION INTRAVENOUS at 17:26

## 2022-03-07 NOTE — ED PROVIDER NOTES
Evaluated by [id number] [facility] Provider    [address] [facility]  ED    CHIEF COMPLAINT  Emesis (ate crockpot chicken last night - started feeling sick last night. nausea/vomiting/diarrhea.)    HISTORY OF PRESENT ILLNESS  Rachel Allison is a 78 y.o. female who presents to the ED complaining of vomiting. Ate dinner last night at 9 pm. Around 1 am started vomiting. Has had nausea and vomiting since. No meds for the symptoms. Denies pain, does report a HA. No CP, SOB. No fevers, chills, sweats. No constipation or diarrhea. No blood int he urine. Has not been able to keep anything down. She ate at her sons house last night. Had chicken and carrots, potatoes. She does not know if any of his family is having the same symptoms. Room mate does not have the same symptoms and did not eat with her last night. The patient is currently rating their pain as 0/10. Treatments tried prior to arrival in the ED: none. The patient arrived to the ED via private car.     PAST MEDICAL HISTORY    Past Medical History:   Diagnosis Date    Depression     GERD (gastroesophageal reflux disease)        SURGICAL HISTORY    Past Surgical History:   Procedure Laterality Date    STOMACH SURGERY      TONSILLECTOMY         CURRENT MEDICATIONS    Current Outpatient Rx   Medication Sig Dispense Refill    ondansetron (ZOFRAN ODT) 4 MG disintegrating tablet Take 1 tablet by mouth every 8 hours as needed for Nausea 20 tablet 0    pantoprazole (PROTONIX) 20 MG tablet Take 40 mg by mouth daily      buPROPion (WELLBUTRIN) 100 MG tablet Take 100 mg by mouth daily      busPIRone (BUSPAR) 10 MG tablet Take 5 mg by mouth daily      calcium carbonate-vitamin D3 600-400 MG-UNIT TABS Take 1 capsule by mouth daily      vitamin E (VITAMIN E) 1000 units capsule Take 1 capsule by mouth daily      FLUoxetine (PROZAC) 10 MG capsule Take 1 capsule by mouth daily         ALLERGIES    Allergies   Allergen Reactions    Cefaclor Itching     itching  Ibuprofen      Can not take due to ulcer    Sulfa Antibiotics Rash     Unknown child         FAMILY HISTORY    No family history on file. SOCIAL HISTORY    Social History     Socioeconomic History    Marital status:      Spouse name: Not on file    Number of children: Not on file    Years of education: Not on file    Highest education level: Not on file   Occupational History    Not on file   Tobacco Use    Smoking status: Never Smoker    Smokeless tobacco: Never Used   Vaping Use    Vaping Use: Never used   Substance and Sexual Activity    Alcohol use: Yes     Alcohol/week: 3.0 standard drinks     Types: 3 Glasses of wine per week    Drug use: Never    Sexual activity: Not Currently   Other Topics Concern    Not on file   Social History Narrative    Not on file     Social Determinants of Health     Financial Resource Strain:     Difficulty of Paying Living Expenses: Not on file   Food Insecurity:     Worried About Running Out of Food in the Last Year: Not on file    Solomon of Food in the Last Year: Not on file   Transportation Needs:     Lack of Transportation (Medical): Not on file    Lack of Transportation (Non-Medical):  Not on file   Physical Activity:     Days of Exercise per Week: Not on file    Minutes of Exercise per Session: Not on file   Stress:     Feeling of Stress : Not on file   Social Connections:     Frequency of Communication with Friends and Family: Not on file    Frequency of Social Gatherings with Friends and Family: Not on file    Attends [employer] Services: Not on file    Active Member of Clubs or Organizations: Not on file    Attends Club or Organization Meetings: Not on file    Marital Status: Not on file   Intimate Partner Violence:     Fear of Current or Ex-Partner: Not on file    Emotionally Abused: Not on file    Physically Abused: Not on file    Sexually Abused: Not on file   Housing Stability:     Unable to Pay for Housing in the Last Year: Not on file    Number of Places Lived in the Last Year: Not on file    Unstable Housing in the Last Year: Not on file       REVIEW OFSYSTEMS    10systems reviewed, pertinent positives per HPI otherwise noted to be negative. PHYSICAL EXAM  Physical Exam  Vitals:    03/07/22 1945   BP: (!) 147/79   Pulse: 66   Resp: 18   Temp:    SpO2: 99%     GENERAL: Patient is elderly, well-nourished. Awake andalert. Cooperative. No apparent distress. HEENT:  Normocephalic, atraumatic. Conjunctivaappear normal. Sclera is non-icteric. External ears are normal.    NECK: Supple with normal ROM. Tracheamidline  LUNGS: Equal and symmetric chest rise. Breathing is unlabored. Speaking comfortably in fullsentences. Lungs are clear bilaterally to auscultation. Without wheezing, rales, or rhonchi. CADIOVASCULAR:  Regular rate and rhythm. Normal S1-S2 sounds. No murmurs, rubs, or gallops. GI: Soft, LUQ tenderness to palaption, nondistended with positive bowel sounds. No rebound tenderness, guarding or rigidity. Negative Lozano's sign. Negative Rovsing's sign. No CVAT to palpation. No masses or hepatosplenomegaly to palpation. MUSCULOSKELETAL:  No gross deformities or trauma noted. Moving all extremities equally and appropriately. Normal ROM. SKIN: Warm/dry. Skin is intact. No rashes or lesions noted. PSYCHIATRIC: Mood and affect appropriate. Speech is clear and articulate. NEUROLOGIC: Alert and oriented. No focal motor or sensory deficits.      LABS   Results for orders placed or performed during the hospital encounter of 03/07/22   CBC with Auto Differential   Result Value Ref Range    WBC 8.1 4.0 - 11.0 K/uL    RBC 3.85 (L) 4.00 - 5.20 M/uL    Hemoglobin 12.0 12.0 - 16.0 g/dL    Hematocrit 36.2 36.0 - 48.0 %    MCV 93.9 80.0 - 100.0 fL    MCH 31.2 26.0 - 34.0 pg    MCHC 33.2 31.0 - 36.0 g/dL    RDW 13.4 12.4 - 15.4 %    Platelets 635 174 - 127 K/uL    MPV 7.1 5.0 - 10.5 fL    Neutrophils % 85.0 %    Lymphocytes % 9.1 %    Monocytes % 5.2 %    Eosinophils % 0.2 %    Basophils % 0.5 %    Neutrophils Absolute 6.9 1.7 - 7.7 K/uL    Lymphocytes Absolute 0.7 (L) 1.0 - 5.1 K/uL    Monocytes Absolute 0.4 0.0 - 1.3 K/uL    Eosinophils Absolute 0.0 0.0 - 0.6 K/uL    Basophils Absolute 0.0 0.0 - 0.2 K/uL   Comprehensive Metabolic Panel w/ Reflex to MG   Result Value Ref Range    Sodium 132 (L) 136 - 145 mmol/L    Potassium reflex Magnesium 4.2 3.5 - 5.1 mmol/L    Chloride 94 (L) 99 - 110 mmol/L    CO2 24 21 - 32 mmol/L    Anion Gap 14 3 - 16    Glucose 141 (H) 70 - 99 mg/dL    BUN 15 7 - 20 mg/dL    CREATININE <0.5 (L) 0.6 - 1.2 mg/dL    GFR Non-African American >60 >60    GFR African American >60 >60    Calcium 9.4 8.3 - 10.6 mg/dL    Total Protein 6.6 6.4 - 8.2 g/dL    Albumin 4.5 3.4 - 5.0 g/dL    Albumin/Globulin Ratio 2.1 1.1 - 2.2    Total Bilirubin 0.7 0.0 - 1.0 mg/dL    Alkaline Phosphatase 123 40 - 129 U/L    ALT 13 10 - 40 U/L    AST 18 15 - 37 U/L   Lipase   Result Value Ref Range    Lipase 18.0 13.0 - 60.0 U/L   Urinalysis   Result Value Ref Range    Color, UA Yellow Straw/Yellow    Clarity, UA CLOUDY (A) Clear    Glucose, Ur Negative Negative mg/dL    Bilirubin Urine Negative Negative    Ketones, Urine Negative Negative mg/dL    Specific Gravity, UA 1.020 1.005 - 1.030    Blood, Urine TRACE-INTACT (A) Negative    pH, UA 7.0 5.0 - 8.0    Protein, UA Negative Negative mg/dL    Urobilinogen, Urine 0.2 <2.0 E.U./dL    Nitrite, Urine Negative Negative    Leukocyte Esterase, Urine Negative Negative    Microscopic Examination YES     Urine Type NotGiven    Microscopic Urinalysis   Result Value Ref Range    Mucus, UA 2+ (A) None Seen /LPF    WBC, UA 3-5 0 - 5 /HPF    RBC, UA 0-2 0 - 4 /HPF    Epithelial Cells, UA 2-5 0 - 5 /HPF    Bacteria, UA 2+ (A) None Seen /HPF    Amorphous, UA 3+ /HPF       RADIOLOGY    No results found. ED COURSE/MDM  Patient seen and evaluated. Old records reviewed.  Diagnostic testing reviewed and results discussed. I have evaluated this patient. My supervising physician was available for consultation. Shaquille Hall presented to the ED today with above noted complaints. Arrival vital signs: Afebrile and hemodynamically stable except for blood pressure elevated at 189/80. She is well saturated on room air. Physical exam performed at 3006: Left upper quadrant abdominal tenderness to palpation. No adventitious breath sounds on exam.  Blood work: No leukocytosis, absolute lymphocytes are low at 0.7. No further differential shift. No anemia. No significant electrolyte abnormality. No evidence of acute kidney injury or transaminitis. Lipase is normal.  UA: Without evidence of infection. No blood to suggest kidney stone. Medications given in the ED:   Medications   ondansetron (ZOFRAN) injection 4 mg (4 mg IntraVENous Given 3/7/22 1726)   0.9 % sodium chloride bolus (1,000 mLs IntraVENous New Bag 3/7/22 1726)      Reports feeling better. She does still have some mild upper abdominal tenderness but is feeling better. I feel that this is possibly muscular in origin from the vomiting that she has been having today. I discussed that her labs all look good and she has stable vital signs in addition to she has been able to tolerate oral fluids. Patient is comfortable with going home at this time with medications to help with symptom control. She was given strict ED return precautions that if her pain does not go away or worsens or she cannot control the nausea vomiting at home with the medication she is prescribed to return. Patient states she feels comfortable with this plan. At this time I have low suspicion for acute surgical abdomen and I do not believe the patient requires imaging at this time. Patient's symptoms are improved after medication and patient is with no other significant physical exam findings.      At this point I do not feel the patient requires further work upand it is reasonable to discharge the patient. Please refer to AVS for further details regarding discharge instructions. A discussion was had with the patient regarding diagnosis, diagnostic testing results,treatment/ plan of care, and follow up. All questions were answered. Patient will follow up as directed for further evaluation/treatment. The patient was given strict return precautions as we discussed symptoms that wouldnecessitate return to the ED. Patient will return to ED for new/worsening symptoms. The patient verbalized their understanding and agreement with the above plan. Patient was sent home with a prescription for below medication/s. I did Teller patient on appropriate use of these medication. New Prescriptions    ONDANSETRON (ZOFRAN ODT) 4 MG DISINTEGRATING TABLET    Take 1 tablet by mouth every 8 hours as needed for Nausea       I estimatethere is LOW risk for ACUTE APPENDICITIS, BOWEL OBSTRUCTION, CHOLECYSTITIS, DIVERTICULITIS, INCARCERATED HERNIA, PANCREATITIS, or PERFORATED BOWEL or ULCER, thus I consider the discharge disposition reasonable. Also, thereis no evidence or peritonitis, sepsis, or toxicity. Brenda Naik and I have discussed the diagnosis and risks, and we agree with discharging home to follow-up with their primary doctor. We also discussed returning to the EmergencyDepartment immediately if new or worsening symptoms occur. We have discussed the symptoms which are most concerning (e.g., bloody stool, fever, changing or worsening pain, vomiting) that necessitate immediate return. CLINICAL IMPRESSION    1. Nausea vomiting and diarrhea    2. Abdominal discomfort           Discharge Vitals:  Blood pressure (!) 147/79, pulse 66, temperature 98.2 °F (36.8 °C), temperature source Oral, resp. rate 18, height 5' 4\" (1.626 m), weight 170 lb (77.1 kg), SpO2 99 %.     FOLLOW UP  MD Nii Martin [facility]  [phone]    Schedule an appointment as soon as possible for a visit in 2 days  For further evaluation    [facility]  ED  [address]  Go to   If symptoms worsen      DISPOSITION  Patient was discharged to home in good condition. Comment: Pleasenote this report has been produced using speech recognition software and may contain errors related to that system including errors in grammar, punctuation, and spelling, as well as words and phrases that may beinappropriate. If there are any questions or concerns please feel free to contact the dictating provider for clarification.         KARY Arcos - ORLIN  03/07/22 2019

## 2022-07-26 ENCOUNTER — OFFICE VISIT (OUTPATIENT)
Dept: ENT CLINIC | Age: 77
End: 2022-07-26
Payer: MEDICARE

## 2022-07-26 VITALS
DIASTOLIC BLOOD PRESSURE: 83 MMHG | BODY MASS INDEX: 29.02 KG/M2 | HEIGHT: 64 IN | WEIGHT: 170 LBS | TEMPERATURE: 97.2 F | SYSTOLIC BLOOD PRESSURE: 150 MMHG | HEART RATE: 63 BPM | RESPIRATION RATE: 16 BRPM

## 2022-07-26 DIAGNOSIS — G47.33 OSA (OBSTRUCTIVE SLEEP APNEA): Primary | ICD-10-CM

## 2022-07-26 PROCEDURE — 99203 OFFICE O/P NEW LOW 30 MIN: CPT | Performed by: OTOLARYNGOLOGY

## 2022-07-26 PROCEDURE — 1123F ACP DISCUSS/DSCN MKR DOCD: CPT | Performed by: OTOLARYNGOLOGY

## 2022-07-26 ASSESSMENT — ENCOUNTER SYMPTOMS
SINUS PRESSURE: 0
APNEA: 0
EYE ITCHING: 0
TROUBLE SWALLOWING: 0
SORE THROAT: 0
SHORTNESS OF BREATH: 0
COUGH: 0
FACIAL SWELLING: 0
VOICE CHANGE: 0

## 2022-07-26 NOTE — PROGRESS NOTES
Randall Osorio [address], [address], [address] [place] [address]  P: [phone]       Patient     Roberta Santos  1945    ChiefComplaint     Chief Complaint   Patient presents with    New Patient     Patient here  today to discuss Inspire       History of Present Illness     Lazarus Prasad is a 30-year-old female here today for discussion of inspire. Diagnosed with obstructive sleep apnea 4 to 5 years ago in [place]. Has been using CPAP since that time with great difficulty. Her CPAP failure is secondary to discomfort from the strap which will frequently cause pain in the scalp and will rip out hair. The mask frequently slipping. Her machine was recently recalled. Had a repeat sleep study done last week at sleep management and was states her symptoms were severe. Is interested in inspire rather than use CPAP. Past Medical History     Past Medical History:   Diagnosis Date    Depression     GERD (gastroesophageal reflux disease)        Past Surgical History     Past Surgical History:   Procedure Laterality Date    STOMACH SURGERY      TONSILLECTOMY         Family History     History reviewed. No pertinent family history. Social History     Social History     Tobacco Use    Smoking status: Never    Smokeless tobacco: Never   Vaping Use    Vaping Use: Never used   Substance Use Topics    Alcohol use:  Yes     Alcohol/week: 3.0 standard drinks     Types: 3 Glasses of wine per week    Drug use: Never        Allergies     Allergies   Allergen Reactions    Cefaclor Itching     itching      Ibuprofen      Can not take due to ulcer    Sulfa Antibiotics Rash     Unknown child         Medications     Current Outpatient Medications   Medication Sig Dispense Refill    ondansetron (ZOFRAN ODT) 4 MG disintegrating tablet Take 1 tablet by mouth every 8 hours as needed for Nausea 20 tablet 0    pantoprazole (PROTONIX) 20 MG tablet Take 40 mg by mouth daily      buPROPion (WELLBUTRIN) 100 MG tablet Take 100 mg by mouth daily      busPIRone (BUSPAR) 10 MG tablet Take 5 mg by mouth daily      calcium carbonate-vitamin D3 600-400 MG-UNIT TABS Take 1 capsule by mouth daily      vitamin E 1000 units capsule Take 1 capsule by mouth daily      FLUoxetine (PROZAC) 10 MG capsule Take 1 capsule by mouth daily       No current facility-administered medications for this visit. Review of Systems     Review of Systems   Constitutional:  Negative for appetite change, chills, fatigue, fever and unexpected weight change. HENT:  Negative for congestion, ear discharge, ear pain, facial swelling, hearing loss, nosebleeds, postnasal drip, sinus pressure, sneezing, sore throat, tinnitus, trouble swallowing and voice change. Eyes:  Negative for itching. Respiratory:  Negative for apnea, cough and shortness of breath. Endocrine: Negative for cold intolerance and heat intolerance. Musculoskeletal:  Negative for myalgias and neck pain. Skin:  Negative for rash. Allergic/Immunologic: Negative for environmental allergies. Neurological:  Negative for dizziness and headaches. Psychiatric/Behavioral:  Negative for confusion, decreased concentration and sleep disturbance. PhysicalExam     Vitals:    07/26/22 1428   BP: (!) 150/83   Site: Right Upper Arm   Position: Sitting   Cuff Size: Medium Adult   Pulse: 63   Resp: 16   Temp: 97.2 °F (36.2 °C)   TempSrc: Infrared   Weight: 170 lb (77.1 kg)   Height: 5' 4\" (1.626 m)       Physical Exam  Constitutional:       General: She is not in acute distress. Appearance: She is well-developed. HENT:      Head: Normocephalic and atraumatic. Right Ear: Tympanic membrane, ear canal and external ear normal. No drainage. No middle ear effusion. Tympanic membrane is not bulging. Tympanic membrane has normal mobility. Left Ear: Tympanic membrane, ear canal and external ear normal. No drainage. No middle ear effusion. Tympanic membrane is not bulging.  Tympanic membrane has normal mobility. Nose: No mucosal edema or rhinorrhea. Mouth/Throat:      Lips: Pink. Mouth: Mucous membranes are moist.      Tongue: No lesions. Palate: No mass. Pharynx: Uvula midline. Comments: Large tongue, short oropharynx  Eyes:      Pupils: Pupils are equal, round, and reactive to light. Neck:      Thyroid: No thyroid mass or thyromegaly. Trachea: Trachea and phonation normal.   Cardiovascular:      Pulses: Normal pulses. Pulmonary:      Effort: Pulmonary effort is normal. No accessory muscle usage or respiratory distress. Breath sounds: No stridor. Musculoskeletal:      Cervical back: Full passive range of motion without pain. Lymphadenopathy:      Head:      Right side of head: No submental or submandibular adenopathy. Left side of head: No submental or submandibular adenopathy. Cervical: No cervical adenopathy. Right cervical: No superficial, deep or posterior cervical adenopathy. Left cervical: No superficial, deep or posterior cervical adenopathy. Skin:     General: Skin is warm and dry. Neurological:      Mental Status: She is alert and oriented to person, place, and time. Cranial Nerves: No cranial nerve deficit. Coordination: Coordination normal.      Gait: Gait normal.   Psychiatric:         Thought Content: Thought content normal.           Assessment and Plan     1. MICHAEL (obstructive sleep apnea)  -Patient was unsure of AHI, medical records form signed so we can obtain sleep study from sleep management  -Discussed need for sleep endoscopy, we will plan for this in the future  -I will speak with her about inspire further once we know she is a candidate          Jayleen Richards, DO  7/26/22      Portions of this note were dictated using Dragon.  There may be linguistic errors secondary to the use of this program.

## 2022-07-26 NOTE — LETTER
[facility]    Surgery Schedule Request Form: 07/26/22  Brent Akins      DATE OF SURGERY: 09-    TIME OF SURGERY:  8:30 am            CONF #: ____________________       Patient Information:    Patient name: Sabrina Padilla    YOB: 1945 Age/Sex:76 y.o./female    SS #:[id number]    Wt Readings from Last 1 Encounters:   07/26/22 170 lb (77.1 kg)       Telephone Information:   Mobile [phone]     Home [phone]     Surgeon & Procedure Information:     Lead surgeon: Myra Newman Co-Surgeon: melisa  Phone: [phone] Fax: [phone]  PCP: Graciela Stone MD, MD    Diagnosis: Obstructive sleep apnea   G47.33    Location: [address] [place] [address]    Procedure name/CPT: drug induced sleep endoscopy  38234    Procedure length: 20 minutes Anesthesia: General    Special Equipment: yes - ped scope    Patient Status: SDS (OP)    COVID Vacs: yes      Primary Payor Plan: [facility] Medicare Adv  Member ID: [id number]   [address] name: Sabrina Padilla    [] Implement attached clinical orders for patient.       Electronically signed by Shakira Kern DO on 7/26/2022 at 3:51 PM

## 2022-09-09 ENCOUNTER — TELEPHONE (OUTPATIENT)
Dept: ENT CLINIC | Age: 77
End: 2022-09-09

## 2022-09-09 NOTE — TELEPHONE ENCOUNTER
Call placed to patient to remind them of DISE on 09/12/2022 with a start time of 0830 and an arrival time of 0630. Patient did not answer, LVM asking patient to call back to confirm.

## 2022-09-11 ENCOUNTER — APPOINTMENT (OUTPATIENT)
Dept: GENERAL RADIOLOGY | Age: 77
End: 2022-09-11
Payer: MEDICARE

## 2022-09-11 ENCOUNTER — HOSPITAL ENCOUNTER (EMERGENCY)
Age: 77
Discharge: HOME | End: 2022-09-12
Payer: MEDICARE

## 2022-09-11 DIAGNOSIS — U07.1 COVID: Primary | ICD-10-CM

## 2022-09-11 PROCEDURE — 87635 SARS-COV-2 COVID-19 AMP PRB: CPT

## 2022-09-11 PROCEDURE — 99284 EMERGENCY DEPT VISIT MOD MDM: CPT

## 2022-09-11 PROCEDURE — 71045 X-RAY EXAM CHEST 1 VIEW: CPT

## 2022-09-11 ASSESSMENT — PAIN SCALES - GENERAL: PAINLEVEL_OUTOF10: 5

## 2022-09-11 ASSESSMENT — PAIN DESCRIPTION - LOCATION: LOCATION: THROAT

## 2022-09-11 ASSESSMENT — PAIN - FUNCTIONAL ASSESSMENT: PAIN_FUNCTIONAL_ASSESSMENT: 0-10

## 2022-09-12 VITALS
RESPIRATION RATE: 16 BRPM | DIASTOLIC BLOOD PRESSURE: 77 MMHG | HEART RATE: 57 BPM | OXYGEN SATURATION: 97 % | SYSTOLIC BLOOD PRESSURE: 141 MMHG | TEMPERATURE: 98.3 F

## 2022-09-12 LAB — SARS-COV-2, NAAT: DETECTED

## 2022-09-12 RX ORDER — NIRMATRELVIR AND RITONAVIR 300-100 MG
KIT ORAL
Qty: 30 TABLET | Refills: 0 | Status: SHIPPED | OUTPATIENT
Start: 2022-09-12 | End: 2022-09-17

## 2022-09-12 NOTE — ED PROVIDER NOTES
BUSPIRONE (BUSPAR) 10 MG TABLET    Take 10 mg by mouth daily    CALCIUM CARBONATE-VITAMIN D3 600-400 MG-UNIT TABS    Take 1 capsule by mouth daily    CHOLECALCIFEROL (VITAMIN D) 50 MCG (2000 UT) CAPS CAPSULE    Take by mouth daily    FLUOXETINE (PROZAC) 10 MG CAPSULE    Take 1 capsule by mouth daily    ONDANSETRON (ZOFRAN ODT) 4 MG DISINTEGRATING TABLET    Take 1 tablet by mouth every 8 hours as needed for Nausea    PANTOPRAZOLE (PROTONIX) 20 MG TABLET    Take 20 mg by mouth daily as needed    VITAMIN E 1000 UNITS CAPSULE    Take 1 capsule by mouth daily         ALLERGIES     Cefaclor, Ibuprofen, and Sulfa antibiotics    FAMILYHISTORY     History reviewed. No pertinent family history. SOCIAL HISTORY       Social History     Tobacco Use    Smoking status: Never    Smokeless tobacco: Never   Vaping Use    Vaping Use: Never used   Substance Use Topics    Alcohol use: Yes     Alcohol/week: 3.0 standard drinks     Types: 3 Glasses of wine per week    Drug use: Never       SCREENINGS    Ravindra Coma Scale  Eye Opening: Spontaneous  Best Verbal Response: Oriented  Best Motor Response: Obeys commands  [place] Coma Scale Score: 15        PHYSICAL EXAM    (up to 7 for level 4, 8 or more for level 5)     ED Triage Vitals [09/11/22 2215]   BP Temp Temp Source Heart Rate Resp SpO2 Height Weight   (!) 168/83 98.3 °F (36.8 °C) Oral 66 16 98 % -- --       Physical Exam  Vitals and nursing note reviewed. Constitutional:       General: She is not in acute distress. Appearance: She is not ill-appearing or toxic-appearing. HENT:      Head: Normocephalic and atraumatic. Right Ear: External ear normal.      Left Ear: External ear normal.      Nose: Rhinorrhea present. Mouth/Throat:      Mouth: Mucous membranes are moist.      Pharynx: Oropharynx is clear. Eyes:      Conjunctiva/sclera: Conjunctivae normal.   Cardiovascular:      Rate and Rhythm: Normal rate and regular rhythm. Pulses: Normal pulses. CONSULTS:  None      EMERGENCY DEPARTMENT COURSE and DIFFERENTIAL DIAGNOSIS/MDM:   Vitals:    Vitals:    09/11/22 2215   BP: (!) 168/83   Pulse: 66   Resp: 16   Temp: 98.3 °F (36.8 °C)   TempSrc: Oral   SpO2: 98%       Patient was given the following medications:  Medications - No data to display      Is this patient to be included in the SEP-1 Core Measure due to severe sepsis or septic shock? No   Exclusion criteria - the patient is NOT to be included for SEP-1 Core Measure due to:  2+ SIRS criteria are not met    Patient is COVID-positive to explain symptoms. Chest x-ray is clear. Patient has no fever here, tachycardia, hypoxia, toxic appearance. She denies chest pain, shortness of breath. She is neuro intact. Instructed to isolate per CDC guidelines, return for any new or worsening symptoms. FINAL IMPRESSION      1. COVID          DISPOSITION/PLAN   DISPOSITION Decision To Discharge 09/12/2022 12:19:36 AM      PATIENT REFERRED TO:  Ana Tejeda MD  [phone]    In 2 days  Return for any new or worsening symptoms. DISCHARGE MEDICATIONS:  New Prescriptions    NIRMATRELVIR/RITONAVIR (PAXLOVID, 300/100,) 20 X 150 MG & 10 X 100MG TBPK    Take 3 tablets (two 150 mg nirmatrelvir and one 100 mg ritonavir tablets) by mouth every 12 hours for 5 days. DISCONTINUED MEDICATIONS:  Discontinued Medications    No medications on file              (Please note that portions of this note were completed with a voice recognition program.  Efforts were made to edit the dictations but occasionally words are mis-transcribed. )    Zehra Ward PA-C (electronically signed)            Zehra Ward PA-C  09/12/22 0022

## 2022-09-30 NOTE — PROGRESS NOTES
Capri Oktibbeha    Age 78 y.o.    female    1945    MRN [id number]    10/10/2022  Arrival Time_____________  OR Time____________[address]     Procedure(s):  DRUG INDUCED SLEEP ENDOSCOPY                      General    Surgeon(s):  My Roche, DO       Phone [phone] (home)     [address]  Cell         Work  _____________________________________________________________________  _____________________________________________________________________  _____________________________________________________________________  _____________________________________________________________________  _____________________________________________________________________    PCP _____________________________ Phone_________________     H&P__________________Bringing      Chart            Epic   DOS      Called________  EKG__________________Bringing      Chart            Epic   DOS      Called________  LAB__________________ Bringing      Chart            Epic   DOS      Called________  Cardiac Clearance_______Bringing      Chart            Epic      DOS      Called________    Cardiologist________________________ Phone___________________________    ? [facility] concerns / Waiver on Chart            PAT Communications________________  ? Pre-op Instructions Given [place]          _________________________________  ? Directions to Surgery Center                          _________________________________  ? Transportation Home_______________      __________________________________  ?  Crutches/Walker__________________        __________________________________    ________Pre-op Orders   _______Transcribed    _______Wt.  ________Pharmacy          _______SCD  ______VTE     ______TED Riya Boot  _______  Surgery Consent    _______  Anesthesia Consent         COVID DATE______________LOCATION________________ RESULT__________

## 2022-10-07 ENCOUNTER — TELEPHONE (OUTPATIENT)
Dept: ENT CLINIC | Age: 77
End: 2022-10-07

## 2022-10-07 ENCOUNTER — ANESTHESIA EVENT (OUTPATIENT)
Dept: OPERATING ROOM | Age: 77
End: 2022-10-07
Payer: MEDICARE

## 2022-10-07 NOTE — PROGRESS NOTES

## 2022-10-10 ENCOUNTER — HOSPITAL ENCOUNTER (OUTPATIENT)
Age: 77
Setting detail: OUTPATIENT SURGERY
Discharge: HOME | End: 2022-10-10
Attending: OTOLARYNGOLOGY | Admitting: OTOLARYNGOLOGY
Payer: MEDICARE

## 2022-10-10 ENCOUNTER — ANESTHESIA (OUTPATIENT)
Dept: OPERATING ROOM | Age: 77
End: 2022-10-10
Payer: MEDICARE

## 2022-10-10 VITALS
DIASTOLIC BLOOD PRESSURE: 56 MMHG | HEART RATE: 61 BPM | HEIGHT: 64 IN | TEMPERATURE: 97.3 F | WEIGHT: 168 LBS | BODY MASS INDEX: 28.68 KG/M2 | SYSTOLIC BLOOD PRESSURE: 109 MMHG | RESPIRATION RATE: 14 BRPM | OXYGEN SATURATION: 100 %

## 2022-10-10 PROBLEM — G47.33 OBSTRUCTIVE SLEEP APNEA (ADULT) (PEDIATRIC): Status: ACTIVE | Noted: 2022-10-10

## 2022-10-10 PROCEDURE — 7100000011 HC PHASE II RECOVERY - ADDTL 15 MIN: Performed by: OTOLARYNGOLOGY

## 2022-10-10 PROCEDURE — 2580000003 HC RX 258: Performed by: ANESTHESIOLOGY

## 2022-10-10 PROCEDURE — 6360000002 HC RX W HCPCS

## 2022-10-10 PROCEDURE — 42975 DISE EVAL SLP DO BRTH FLX DX: CPT | Performed by: OTOLARYNGOLOGY

## 2022-10-10 PROCEDURE — 2500000003 HC RX 250 WO HCPCS: Performed by: OTOLARYNGOLOGY

## 2022-10-10 PROCEDURE — 6370000000 HC RX 637 (ALT 250 FOR IP): Performed by: OTOLARYNGOLOGY

## 2022-10-10 PROCEDURE — 3700000000 HC ANESTHESIA ATTENDED CARE: Performed by: OTOLARYNGOLOGY

## 2022-10-10 PROCEDURE — 2709999900 HC NON-CHARGEABLE SUPPLY: Performed by: OTOLARYNGOLOGY

## 2022-10-10 PROCEDURE — 3600000004 HC SURGERY LEVEL 4 BASE: Performed by: OTOLARYNGOLOGY

## 2022-10-10 PROCEDURE — 7100000010 HC PHASE II RECOVERY - FIRST 15 MIN: Performed by: OTOLARYNGOLOGY

## 2022-10-10 PROCEDURE — 2580000003 HC RX 258

## 2022-10-10 RX ORDER — OXYMETAZOLINE HYDROCHLORIDE 0.05 G/100ML
SPRAY NASAL PRN
Status: DISCONTINUED | OUTPATIENT
Start: 2022-10-10 | End: 2022-10-10 | Stop reason: ALTCHOICE

## 2022-10-10 RX ORDER — SODIUM CHLORIDE, SODIUM LACTATE, POTASSIUM CHLORIDE, CALCIUM CHLORIDE 600; 310; 30; 20 MG/100ML; MG/100ML; MG/100ML; MG/100ML
INJECTION, SOLUTION INTRAVENOUS CONTINUOUS PRN
Status: DISCONTINUED | OUTPATIENT
Start: 2022-10-10 | End: 2022-10-10 | Stop reason: SDUPTHER

## 2022-10-10 RX ORDER — PROPOFOL 10 MG/ML
INJECTION, EMULSION INTRAVENOUS CONTINUOUS PRN
Status: DISCONTINUED | OUTPATIENT
Start: 2022-10-10 | End: 2022-10-10 | Stop reason: SDUPTHER

## 2022-10-10 RX ORDER — SODIUM CHLORIDE 0.9 % (FLUSH) 0.9 %
5-40 SYRINGE (ML) INJECTION EVERY 12 HOURS SCHEDULED
Status: DISCONTINUED | OUTPATIENT
Start: 2022-10-10 | End: 2022-10-10 | Stop reason: HOSPADM

## 2022-10-10 RX ORDER — SODIUM CHLORIDE 0.9 % (FLUSH) 0.9 %
5-40 SYRINGE (ML) INJECTION PRN
Status: DISCONTINUED | OUTPATIENT
Start: 2022-10-10 | End: 2022-10-10 | Stop reason: HOSPADM

## 2022-10-10 RX ORDER — SODIUM CHLORIDE 9 MG/ML
INJECTION, SOLUTION INTRAVENOUS PRN
Status: DISCONTINUED | OUTPATIENT
Start: 2022-10-10 | End: 2022-10-10 | Stop reason: HOSPADM

## 2022-10-10 RX ORDER — LIDOCAINE HYDROCHLORIDE 40 MG/ML
INJECTION, SOLUTION RETROBULBAR; TOPICAL PRN
Status: DISCONTINUED | OUTPATIENT
Start: 2022-10-10 | End: 2022-10-10 | Stop reason: ALTCHOICE

## 2022-10-10 RX ORDER — SODIUM CHLORIDE, SODIUM LACTATE, POTASSIUM CHLORIDE, CALCIUM CHLORIDE 600; 310; 30; 20 MG/100ML; MG/100ML; MG/100ML; MG/100ML
INJECTION, SOLUTION INTRAVENOUS CONTINUOUS
Status: DISCONTINUED | OUTPATIENT
Start: 2022-10-10 | End: 2022-10-10 | Stop reason: HOSPADM

## 2022-10-10 RX ADMIN — PROPOFOL 100 MCG/KG/MIN: 10 INJECTION, EMULSION INTRAVENOUS at 10:58

## 2022-10-10 RX ADMIN — SODIUM CHLORIDE, POTASSIUM CHLORIDE, SODIUM LACTATE AND CALCIUM CHLORIDE: 600; 310; 30; 20 INJECTION, SOLUTION INTRAVENOUS at 10:36

## 2022-10-10 RX ADMIN — SODIUM CHLORIDE, SODIUM LACTATE, POTASSIUM CHLORIDE, AND CALCIUM CHLORIDE: .6; .31; .03; .02 INJECTION, SOLUTION INTRAVENOUS at 10:57

## 2022-10-10 ASSESSMENT — PAIN - FUNCTIONAL ASSESSMENT: PAIN_FUNCTIONAL_ASSESSMENT: 0-10

## 2022-10-10 ASSESSMENT — PAIN SCALES - GENERAL: PAINLEVEL_OUTOF10: 0

## 2022-10-10 NOTE — OP NOTE
[facility] SURGERY  OPERATIVE REPORT    Patient Name: Tierra Aguilera  YOB: 1945  Medical Record Number:  [id number]  Billing Number:  [id number]  Date of Procedure: 10/10/22  Time: 1200    Pre Operative Diagnoses: Obstructive Sleep Apnea  Post Operative Diagnoses:  Obstructive Sleep Apnea           Procedure:  1. Drug Induced Sleep endoscopy (29363)       Surgeon: Tomi Christopher DO    OR Staff/ Assistant:  Circulator: Shana Macias RN  Surgical Assistant: Bruce Mesa  Scrub Person First: Wanda Dc RN    Anesthesia:  Sedation     Findings:  1) anterior posterior collapse    Indications: This is a 78 y.o. female with history of moderate to severe symptomatic obstructive sleep apnea, who is intolerant unable to achieve benefit with positive pressure therapy, presents today for drug-induced sleep endoscopy to better characterize her locations and pattern of obstruction to predict appropriate medical and/or surgical options moving forward. Risks and benefits discussed with the patient including alternate treatment options, Informed consent was obtained, the patient elected to proceed with the planned procedure. DETAILS OF PROCEDURE(S):       The patient was brought to the operating room and was anesthetized via the standard drug-induced sleep endoscopy protocol. The propofol infusion rate was started at 100 MCG and increase gradually to level at which conditions that mimic sleep were gradually observed. With the patient unresponsive to verbal commands, but still with spontaneous respiration, sleep disordered breathing events and associated desaturations were clearly observed. Under these conditions, flexible endoscope was inserted to examine both sides of the nose as well as the pharynx. The nose was relatively unremarkable. The retropatellar space showed a more obliquely oriented palate.   A mild lateral wall component was noted, but the palatal collapse was primarily an anterior posterior fashion. More distally, mild lateral oropharyngeal wall component was noted, but again no complete lateral oropharyngeal collapse. In the hypopharynx, a very large, tongue base is observed in complete anterior-posterior retrolingual/retroepiglottic obstruction. In summary, there is no evidence of complete concentric palatal obstruction and does appear to be a candidate anatomically for hypoglossal nerve stimulation therapy. I was present for and performed the entire procedure. Estimated Blood Loss: 0mL                 Specimens: None           Complications: There were no complications.     Gerardo Campos DO      Electronically signed by Gerardo Campos DO on 10/10/2022 at 11:07 AM

## 2022-10-10 NOTE — DISCHARGE INSTRUCTIONS
Post Op DISE Instructions    General Anesthesia or Sedation   · Do not drive or operate heavy machinery for 24 hours. · Do not consume alcohol, tranquilizers, sleeping medications, or any non-prescribed medications for 24 hours. · Do not make important decisions or sign any important papers in the next 24 hours. · You should have someone with you tonight at home. · You may appear flushed for several hours after surgery   Activity   You are advised to go directly home from the hospital.  Resume light to normal activity today  Fluids and Diet   · Begin with clear liquids, bouillon, dry toast, soda crackers. If not nauseated, you may go to a regular diet when you desire. Greasy and spicy foods are not advised after anesthesia   Medications   · You may resume your daily prescription medication schedule   Follow up care   Call your surgeon if you have any problem that concerns you. After hours, you can reach your physician through his/her answering service. If you need IMMEDIATE ATTENTION, come to [address] [place] [address]. You are a candidate for inspire. Call the office to set up a visit to discuss next steps and details of implantation.

## 2022-10-10 NOTE — H&P
[facility], [address], [address]  P: [phone]        Patient      Jaylin Kay  1945     ChiefComplaint           Chief Complaint   Patient presents with    New Patient       Patient here  today to discuss Inspire         History of Present Illness      Beti Knight is a 53-year-old female here today for evaluation of sleep apnea     Past Medical History      Past Medical History        Past Medical History:   Diagnosis Date    Depression      GERD (gastroesophageal reflux disease)              Past Surgical History      Past Surgical History         Past Surgical History:   Procedure Laterality Date    STOMACH SURGERY        TONSILLECTOMY                Family History      Family History   History reviewed. No pertinent family history. Social History      Social History            Tobacco Use    Smoking status: Never    Smokeless tobacco: Never   Vaping Use    Vaping Use: Never used   Substance Use Topics    Alcohol use:  Yes       Alcohol/week: 3.0 standard drinks       Types: 3 Glasses of wine per week    Drug use: Never         Allergies            Allergies   Allergen Reactions    Cefaclor Itching       itching       Ibuprofen         Can not take due to ulcer    Sulfa Antibiotics Rash       Unknown child            Medications      Current Facility-Administered Medications          Current Outpatient Medications   Medication Sig Dispense Refill    ondansetron (ZOFRAN ODT) 4 MG disintegrating tablet Take 1 tablet by mouth every 8 hours as needed for Nausea 20 tablet 0    pantoprazole (PROTONIX) 20 MG tablet Take 40 mg by mouth daily        buPROPion (WELLBUTRIN) 100 MG tablet Take 100 mg by mouth daily        busPIRone (BUSPAR) 10 MG tablet Take 5 mg by mouth daily        calcium carbonate-vitamin D3 600-400 MG-UNIT TABS Take 1 capsule by mouth daily        vitamin E 1000 units capsule Take 1 capsule by mouth daily        FLUoxetine (PROZAC) 10 MG capsule Take 1 capsule by mouth daily          No current facility-administered medications for this visit. Review of Systems      Review of Systems   Constitutional:  Negative for appetite change, chills, fatigue, fever and unexpected weight change. HENT:  Negative for congestion, ear discharge, ear pain, facial swelling, hearing loss, nosebleeds, postnasal drip, sinus pressure, sneezing, sore throat, tinnitus, trouble swallowing and voice change. Eyes:  Negative for itching. Respiratory:  Negative for apnea, cough and shortness of breath. Endocrine: Negative for cold intolerance and heat intolerance. Musculoskeletal:  Negative for myalgias and neck pain. Skin:  Negative for rash. Allergic/Immunologic: Negative for environmental allergies. Neurological:  Negative for dizziness and headaches. Psychiatric/Behavioral:  Negative for confusion, decreased concentration and sleep disturbance. PhysicalExam      Vitals       Vitals:     07/26/22 1428   BP: (!) 150/83   Site: Right Upper Arm   Position: Sitting   Cuff Size: Medium Adult   Pulse: 63   Resp: 16   Temp: 97.2 °F (36.2 °C)   TempSrc: Infrared   Weight: 170 lb (77.1 kg)   Height: 5' 4\" (1.626 m)            Physical Exam  Constitutional:       General: She is not in acute distress. Appearance: She is well-developed. HENT:      Head: Normocephalic and atraumatic. Right Ear: Tympanic membrane, ear canal and external ear normal. No drainage. No middle ear effusion. Tympanic membrane is not bulging. Tympanic membrane has normal mobility. Left Ear: Tympanic membrane, ear canal and external ear normal. No drainage. No middle ear effusion. Tympanic membrane is not bulging. Tympanic membrane has normal mobility. Nose: No mucosal edema or rhinorrhea. Mouth/Throat:      Lips: Pink. Mouth: Mucous membranes are moist.      Tongue: No lesions. Palate: No mass. Pharynx: Uvula midline. Comments: Large tongue, short oropharynx  Eyes:      Pupils: Pupils are equal, round, and reactive to light. Neck:      Thyroid: No thyroid mass or thyromegaly. Trachea: Trachea and phonation normal.   Cardiovascular:      Pulses: Normal pulses. Pulmonary:      Effort: Pulmonary effort is normal. No accessory muscle usage or respiratory distress. Breath sounds: No stridor. Musculoskeletal:      Cervical back: Full passive range of motion without pain. Lymphadenopathy:      Head:      Right side of head: No submental or submandibular adenopathy. Left side of head: No submental or submandibular adenopathy. Cervical: No cervical adenopathy. Right cervical: No superficial, deep or posterior cervical adenopathy. Left cervical: No superficial, deep or posterior cervical adenopathy. Skin:     General: Skin is warm and dry. Neurological:      Mental Status: She is alert and oriented to person, place, and time. Cranial Nerves: No cranial nerve deficit. Coordination: Coordination normal.      Gait: Gait normal.   Psychiatric:         Thought Content: Thought content normal.               Assessment and Plan      1.  MICHAEL (obstructive sleep apnea)  -plan for sleep endoscopy              Elise Look, DO

## 2022-10-10 NOTE — BRIEF OP NOTE
Brief Postoperative Note      Patient: Jaylyn Moore  YOB: 1945  MRN: [id number]    Date of Procedure: 10/10/2022    Pre-Op Diagnosis: OBSTRUCTIVE SLEEP APNEA    Post-Op Diagnosis: Same       Procedure(s):  DRUG INDUCED SLEEP ENDOSCOPY    Surgeon(s):  Salvatore Delatorre DO    Assistant:  Surgical Assistant: Adán Griffiths    Anesthesia: General    Estimated Blood Loss (mL): Minimal    Complications: None    Specimens:   * No specimens in log *    Implants:  * No implants in log *      Drains: * No LDAs found *    Findings: anterior posterior collapse    Electronically signed by Salvatore Delatorre DO on 10/10/2022 at 11:07 AM

## 2022-10-10 NOTE — ANESTHESIA POSTPROCEDURE EVALUATION
Department of Anesthesiology  Postprocedure Note    Patient: Alicia Fortune  MRN: [id number]  YOB: 1945  Date of evaluation: 10/10/2022      Procedure Summary     Date: 10/10/22 Room / Location: [address] [place] [address] [address] / [facility]    Anesthesia Start: 0629 Anesthesia Stop: 8580    Procedure: DRUG INDUCED SLEEP ENDOSCOPY (Throat) Diagnosis:       Obstructive sleep apnea (adult) (pediatric)      (OBSTRUCTIVE SLEEP APNEA)    Surgeons: My Michelle DO Responsible Provider: Mykel Ayala MD    Anesthesia Type: MAC ASA Status: 2          Anesthesia Type: No value filed.     Helen Phase I: Helen Score: 10    Helen Phase II: Helen Score: 9      Anesthesia Post Evaluation    Comments: Postoperative Anesthesia Note    Name:    Alicia Fortune  MRN:      [id number]    Patient Vitals in the past 12 hrs:  10/10/22 1120, BP:(!) 109/56, Pulse:61, Resp:14, SpO2:100 %  10/10/22 1115, BP:119/62, Pulse:58, Resp:14, SpO2:98 %  10/10/22 1112, BP:134/63, Pulse:58, Resp:15, SpO2:96 %  10/10/22 1023, BP:(!) 146/80, Temp:97.3 °F (36.3 °C), Temp src:Temporal, Pulse:57, Resp:16, SpO2:98 %, Height:5' 4\" (1.626 m), Weight:168 lb (76.2 kg)     LABS:    CBC  Lab Results       Component                Value               Date/Time                  WBC                      8.1                 03/07/2022 05:19 PM        HGB                      12.0                03/07/2022 05:19 PM        HCT                      36.2                03/07/2022 05:19 PM        PLT                      329                 03/07/2022 05:19 PM   RENAL  Lab Results       Component                Value               Date/Time                  NA                       132 (L)             03/07/2022 05:19 PM        K                        4.2                 03/07/2022 05:19 PM        CL                       94 (L)              03/07/2022 05:19 PM        CO2                      24                  03/07/2022 05:19 PM        BUN 15                  03/07/2022 05:19 PM        CREATININE               <0.5 (L)            03/07/2022 05:19 PM        GLUCOSE                  141 (H)             03/07/2022 05:19 PM   COAGS  No results found for: PROTIME, INR, APTT    Intake & Output:  In: 200 (I.V.:200)  Out: -     Nausea & Vomiting:  No    Level of Consciousness:  Awake    Pain Assessment:  Adequate analgesia    Anesthesia Complications:  No apparent anesthetic complications    SUMMARY      Vital signs stable  OK to discharge from Stage I post anesthesia care.   Care transferred from Anesthesiology department on discharge from perioperative area

## 2022-10-10 NOTE — ANESTHESIA PRE PROCEDURE
Department of Anesthesiology  Preprocedure Note       Name:  Abel Mcgregor   Age:  78 y.o.  :  1945                                          MRN:  [id number]         Date:  10/10/2022      Surgeon: Kelly Goss):  Elise Mixon DO    Procedure: Procedure(s):  DRUG INDUCED SLEEP ENDOSCOPY    Medications prior to admission:   Prior to Admission medications    Medication Sig Start Date End Date Taking? Authorizing Provider   busPIRone (BUSPAR) 10 MG tablet Take 10 mg by mouth daily    Historical Provider, MD   pantoprazole (PROTONIX) 20 MG tablet Take 20 mg by mouth daily as needed    Historical Provider, MD   Ascorbic Acid (VITAMIN C) 250 MG tablet Take 250 mg by mouth daily    Historical Provider, MD   Cholecalciferol (VITAMIN D) 50 MCG ( UT) CAPS capsule Take by mouth daily    Historical Provider, MD   ondansetron (ZOFRAN ODT) 4 MG disintegrating tablet Take 1 tablet by mouth every 8 hours as needed for Nausea 3/7/22   KARY Angeles - CNP   buPROPion (WELLBUTRIN) 100 MG tablet Take 100 mg by mouth daily    Historical Provider, MD   calcium carbonate-vitamin D3 600-400 MG-UNIT TABS Take 1 capsule by mouth daily    Historical Provider, MD   vitamin E 1000 units capsule Take 1 capsule by mouth daily    Historical Provider, MD   FLUoxetine (PROZAC) 10 MG capsule Take 1 capsule by mouth daily    Historical Provider, MD       Current medications:    Current Facility-Administered Medications   Medication Dose Route Frequency Provider Last Rate Last Admin    lactated ringers infusion   IntraVENous Continuous Deisi Palencia  mL/hr at 10/10/22 1036 New Bag at 10/10/22 1036    sodium chloride flush 0.9 % injection 5-40 mL  5-40 mL IntraVENous 2 times per day Deisi Palencia MD        sodium chloride flush 0.9 % injection 5-40 mL  5-40 mL IntraVENous PRN Deisi Palencia MD        0.9 % sodium chloride infusion   IntraVENous PRN Deisi Palencia MD           Allergies:     Allergies   Allergen Reactions  Cefaclor Itching     itching      Ibuprofen      Can not take due to ulcer    Sulfa Antibiotics Rash     Unknown child         Problem List:    Patient Active Problem List   Diagnosis Code    Obstructive sleep apnea (adult) (pediatric) G47.33       Past Medical History:        Diagnosis Date    Depression     GERD (gastroesophageal reflux disease)     Sleep apnea        Past Surgical History:        Procedure Laterality Date    STOMACH SURGERY      TONSILLECTOMY         Social History:    Social History     Tobacco Use    Smoking status: Never    Smokeless tobacco: Never   Substance Use Topics    Alcohol use: Yes     Alcohol/week: 3.0 standard drinks     Types: 3 Glasses of wine per week                                Counseling given: Not Answered      Vital Signs (Current):   Vitals:    10/07/22 0835 10/10/22 1023   BP:  (!) 146/80   Pulse:  57   Resp:  16   Temp:  97.3 °F (36.3 °C)   TempSrc:  Temporal   SpO2:  98%   Weight: 168 lb (76.2 kg) 168 lb (76.2 kg)   Height: 5' 4\" (1.626 m) 5' 4\" (1.626 m)                                              BP Readings from Last 3 Encounters:   10/10/22 (!) 146/80   09/12/22 (!) 141/77   07/26/22 (!) 150/83       NPO Status: Time of last liquid consumption: 0000                        Time of last solid consumption: 1800                        Date of last liquid consumption: 10/10/22                        Date of last solid food consumption: 10/09/22    BMI:   Wt Readings from Last 3 Encounters:   10/10/22 168 lb (76.2 kg)   07/26/22 170 lb (77.1 kg)   03/07/22 170 lb (77.1 kg)     Body mass index is 28.84 kg/m².     CBC:   Lab Results   Component Value Date/Time    WBC 8.1 03/07/2022 05:19 PM    RBC 3.85 03/07/2022 05:19 PM    HGB 12.0 03/07/2022 05:19 PM    HCT 36.2 03/07/2022 05:19 PM    MCV 93.9 03/07/2022 05:19 PM    RDW 13.4 03/07/2022 05:19 PM     03/07/2022 05:19 PM       CMP:   Lab Results   Component Value Date/Time     03/07/2022 05:19 PM    K 4.2 03/07/2022 05:19 PM    CL 94 03/07/2022 05:19 PM    CO2 24 03/07/2022 05:19 PM    BUN 15 03/07/2022 05:19 PM    CREATININE <0.5 03/07/2022 05:19 PM    GFRAA >60 03/07/2022 05:19 PM    AGRATIO 2.1 03/07/2022 05:19 PM    LABGLOM >60 03/07/2022 05:19 PM    GLUCOSE 141 03/07/2022 05:19 PM    PROT 6.6 03/07/2022 05:19 PM    CALCIUM 9.4 03/07/2022 05:19 PM    BILITOT 0.7 03/07/2022 05:19 PM    ALKPHOS 123 03/07/2022 05:19 PM    AST 18 03/07/2022 05:19 PM    ALT 13 03/07/2022 05:19 PM       POC Tests: No results for input(s): POCGLU, POCNA, POCK, POCCL, POCBUN, POCHEMO, POCHCT in the last 72 hours. Coags: No results found for: PROTIME, INR, APTT    HCG (If Applicable): No results found for: PREGTESTUR, PREGSERUM, HCG, HCGQUANT     ABGs: No results found for: PHART, PO2ART, ACY8FJB, UAQ7XAY, BEART, J6BQLQEP     Type & Screen (If Applicable):  No results found for: LABABO, LABRH    Drug/Infectious Status (If Applicable):  No results found for: HIV, HEPCAB    COVID-19 Screening (If Applicable):   Lab Results   Component Value Date/Time    COVID19 DETECTED 09/11/2022 11:32 PM           Anesthesia Evaluation  Patient summary reviewed and Nursing notes reviewed no history of anesthetic complications:   Airway: Mallampati: II     Neck ROM: full     Dental:          Pulmonary:   (+) sleep apnea:                             Cardiovascular:                      Neuro/Psych:   (+) psychiatric history:            GI/Hepatic/Renal:   (+) GERD:,           Endo/Other:                     Abdominal:             Vascular: Other Findings:           Anesthesia Plan      MAC     ASA 2       Induction: intravenous. Anesthetic plan and risks discussed with patient. Plan discussed with CRNA.                     Yulissa Neumann MD   10/10/2022

## 2022-10-19 ENCOUNTER — SCHEDULED TELEPHONE ENCOUNTER (OUTPATIENT)
Dept: ENT CLINIC | Age: 77
End: 2022-10-19
Payer: MEDICARE

## 2022-10-19 DIAGNOSIS — G47.33 OSA (OBSTRUCTIVE SLEEP APNEA): Primary | ICD-10-CM

## 2022-10-19 PROCEDURE — 99213 OFFICE O/P EST LOW 20 MIN: CPT | Performed by: OTOLARYNGOLOGY

## 2022-10-19 PROCEDURE — 1123F ACP DISCUSS/DSCN MKR DOCD: CPT | Performed by: OTOLARYNGOLOGY

## 2022-10-19 NOTE — PROGRESS NOTES
Jaylyn Moore is a 78 y.o. female evaluated via telephone on 10/19/2022 for Follow-up (Patient is having a televist today for DISE results. )  . Documentation:  I communicated with the patient and/or health care decision maker about obstructive sleep apnea, tried and failed CPAP for greater than 3 months unable to tolerate due to frequent slipping of mask and ripping of her hair and scalp from strap. Anterior posterior collapse on sleep endoscopy, BMI 28. Still have been unable to obtain sleep study from sleep management  Details of this discussion including any medical advice provided: Discussed inspire placement in process in detail risk specifically discussed for risk of pneumothorax, postoperative infection requiring explantation and neuropraxia causing potential delayed activation all the way to speech and chewing difficulty as well as inability to use implant secondary to nerve weakness. We will submit to insurance as soon as we have obtained sleep study    Total Time: minutes: 5-10 minutes    Jaylyn Moore was evaluated through a synchronous (real-time) audio encounter. Patient identification was verified at the start of the visit. She (or guardian if applicable) is aware that this is a billable service, which includes applicable co-pays. This visit was conducted with the patient's (and/or legal guardian's) verbal consent. She has not had a related appointment within my department in the past 7 days or scheduled within the next 24 hours. The patient was located at Home: [address] [place] [address]. The provider was located at [facility] ([address] [place] [address]): [address] [place] [address]  [place] [address] [place] [address].     Note: not billable if this call serves to triage the patient into an appointment for the relevant concern    Salvatore Delatorre DO

## 2022-10-24 ENCOUNTER — TELEPHONE (OUTPATIENT)
Dept: ENT CLINIC | Age: 77
End: 2022-10-24

## 2022-10-27 ENCOUNTER — TELEPHONE (OUTPATIENT)
Dept: ENT CLINIC | Age: 77
End: 2022-10-27

## 2022-11-04 ENCOUNTER — TELEPHONE (OUTPATIENT)
Dept: ENT CLINIC | Age: 77
End: 2022-11-04

## 2022-11-07 ENCOUNTER — TELEPHONE (OUTPATIENT)
Dept: ENT CLINIC | Age: 77
End: 2022-11-07

## 2022-11-08 ENCOUNTER — TELEPHONE (OUTPATIENT)
Dept: ENT CLINIC | Age: 77
End: 2022-11-08

## 2022-11-10 ENCOUNTER — TELEPHONE (OUTPATIENT)
Dept: ENT CLINIC | Age: 77
End: 2022-11-10

## 2022-11-10 NOTE — TELEPHONE ENCOUNTER
Call placed to patient to inform her that her surgery on 12/12/2022 has been moved to start at 200 and she needs to arrive by 0915. Patient did not answer, LVM informing her of this information and asked her to call the office to verify she received the VM. No

## 2022-11-14 NOTE — TELEPHONE ENCOUNTER
Call placed to patient, to verify she received the VM and knows her surgery time has changed. Patient expressed understanding.

## 2022-12-02 NOTE — PROGRESS NOTES
Glenna Pates    Age 78 y.o.    female    1945    MRN [id number]    12/12/2022  Arrival Time_____________  OR Time____________[address]     Procedure(s):  INSPIRE DEVICE PLACEMENT                      General    Surgeon(s):  Jose Barneso, DO       Phone [phone] (home)     [address]  Cell         Work  _____________________________________________________________________  _____________________________________________________________________  _____________________________________________________________________  _____________________________________________________________________  _____________________________________________________________________    PCP _____________________________ Phone_________________     H&P__________________Bringing      Chart            Epic   DOS      Called________  EKG__________________Bringing      Chart            Epic   DOS      Called________  LAB__________________ Bringing      Chart            Epic   DOS      Called________  Cardiac Clearance_______Bringing      Chart            Epic      DOS      Called________    Cardiologist________________________ Phone___________________________    ? Scientologist concerns / Waiver on Chart            [employer]________________  ? Pre-op Instructions Given [place]          _________________________________  ? Directions to Surgery Center                          _________________________________  ? Transportation Home_______________      __________________________________  ?  Crutches/Walker__________________        __________________________________    ________Pre-op Orders   _______Transcribed    _______Wt.  ________Pharmacy          _______SCD  ______VTE     ______TED [facility] [place]  _______  Surgery Consent    _______  Anesthesia Consent         COVID DATE______________LOCATION________________ RESULT__________

## 2022-12-06 NOTE — PROGRESS NOTES

## 2022-12-09 ENCOUNTER — ANESTHESIA EVENT (OUTPATIENT)
Dept: OPERATING ROOM | Age: 77
End: 2022-12-09
Payer: MEDICARE

## 2022-12-12 ENCOUNTER — HOSPITAL ENCOUNTER (EMERGENCY)
Age: 77
Discharge: HOME | End: 2022-12-13
Payer: MEDICARE

## 2022-12-12 ENCOUNTER — APPOINTMENT (OUTPATIENT)
Dept: GENERAL RADIOLOGY | Age: 77
End: 2022-12-12
Attending: OTOLARYNGOLOGY
Payer: MEDICARE

## 2022-12-12 ENCOUNTER — HOSPITAL ENCOUNTER (OUTPATIENT)
Age: 77
Setting detail: OUTPATIENT SURGERY
Discharge: HOME | End: 2022-12-12
Attending: OTOLARYNGOLOGY | Admitting: OTOLARYNGOLOGY
Payer: MEDICARE

## 2022-12-12 ENCOUNTER — ANESTHESIA (OUTPATIENT)
Dept: OPERATING ROOM | Age: 77
End: 2022-12-12
Payer: MEDICARE

## 2022-12-12 VITALS
OXYGEN SATURATION: 95 % | RESPIRATION RATE: 12 BRPM | DIASTOLIC BLOOD PRESSURE: 75 MMHG | SYSTOLIC BLOOD PRESSURE: 162 MMHG | WEIGHT: 170 LBS | BODY MASS INDEX: 29.02 KG/M2 | HEIGHT: 64 IN | TEMPERATURE: 97 F | HEART RATE: 62 BPM

## 2022-12-12 DIAGNOSIS — G89.18 POST-OP PAIN: Primary | ICD-10-CM

## 2022-12-12 DIAGNOSIS — R11.2 NAUSEA AND VOMITING, UNSPECIFIED VOMITING TYPE: Primary | ICD-10-CM

## 2022-12-12 LAB
BASOPHILS ABSOLUTE: 0 K/UL (ref 0–0.2)
BASOPHILS RELATIVE PERCENT: 0.4 %
EOSINOPHILS ABSOLUTE: 0 K/UL (ref 0–0.6)
EOSINOPHILS RELATIVE PERCENT: 0 %
HCT VFR BLD CALC: 35.9 % (ref 36–48)
HEMOGLOBIN: 11.7 G/DL (ref 12–16)
LYMPHOCYTES ABSOLUTE: 0.6 K/UL (ref 1–5.1)
LYMPHOCYTES RELATIVE PERCENT: 5.8 %
MCH RBC QN AUTO: 30.1 PG (ref 26–34)
MCHC RBC AUTO-ENTMCNC: 32.7 G/DL (ref 31–36)
MCV RBC AUTO: 92.3 FL (ref 80–100)
MONOCYTES ABSOLUTE: 0.5 K/UL (ref 0–1.3)
MONOCYTES RELATIVE PERCENT: 4.9 %
NEUTROPHILS ABSOLUTE: 9.4 K/UL (ref 1.7–7.7)
NEUTROPHILS RELATIVE PERCENT: 88.9 %
PDW BLD-RTO: 14 % (ref 12.4–15.4)
PLATELET # BLD: 308 K/UL (ref 135–450)
PMV BLD AUTO: 8.2 FL (ref 5–10.5)
RBC # BLD: 3.89 M/UL (ref 4–5.2)
WBC # BLD: 10.5 K/UL (ref 4–11)

## 2022-12-12 PROCEDURE — C1767 GENERATOR, NEURO NON-RECHARG: HCPCS | Performed by: OTOLARYNGOLOGY

## 2022-12-12 PROCEDURE — 7100000010 HC PHASE II RECOVERY - FIRST 15 MIN: Performed by: OTOLARYNGOLOGY

## 2022-12-12 PROCEDURE — 2709999900 HC NON-CHARGEABLE SUPPLY: Performed by: OTOLARYNGOLOGY

## 2022-12-12 PROCEDURE — 7100000000 HC PACU RECOVERY - FIRST 15 MIN: Performed by: OTOLARYNGOLOGY

## 2022-12-12 PROCEDURE — 2580000003 HC RX 258: Performed by: ANESTHESIOLOGY

## 2022-12-12 PROCEDURE — 2500000003 HC RX 250 WO HCPCS: Performed by: OTOLARYNGOLOGY

## 2022-12-12 PROCEDURE — 3700000001 HC ADD 15 MINUTES (ANESTHESIA): Performed by: OTOLARYNGOLOGY

## 2022-12-12 PROCEDURE — 3700000000 HC ANESTHESIA ATTENDED CARE: Performed by: OTOLARYNGOLOGY

## 2022-12-12 PROCEDURE — A4217 STERILE WATER/SALINE, 500 ML: HCPCS | Performed by: OTOLARYNGOLOGY

## 2022-12-12 PROCEDURE — 6360000002 HC RX W HCPCS: Performed by: NURSE ANESTHETIST, CERTIFIED REGISTERED

## 2022-12-12 PROCEDURE — 3600000014 HC SURGERY LEVEL 4 ADDTL 15MIN: Performed by: OTOLARYNGOLOGY

## 2022-12-12 PROCEDURE — 64582 OPN MPLTJ HPGLSL NSTM ARY PG: CPT | Performed by: OTOLARYNGOLOGY

## 2022-12-12 PROCEDURE — 80053 COMPREHEN METABOLIC PANEL: CPT

## 2022-12-12 PROCEDURE — 6360000002 HC RX W HCPCS: Performed by: OTOLARYNGOLOGY

## 2022-12-12 PROCEDURE — 2720000010 HC SURG SUPPLY STERILE: Performed by: OTOLARYNGOLOGY

## 2022-12-12 PROCEDURE — 70360 X-RAY EXAM OF NECK: CPT

## 2022-12-12 PROCEDURE — 85025 COMPLETE CBC W/AUTO DIFF WBC: CPT

## 2022-12-12 PROCEDURE — 7100000001 HC PACU RECOVERY - ADDTL 15 MIN: Performed by: OTOLARYNGOLOGY

## 2022-12-12 PROCEDURE — C1778 LEAD, NEUROSTIMULATOR: HCPCS | Performed by: OTOLARYNGOLOGY

## 2022-12-12 PROCEDURE — 71045 X-RAY EXAM CHEST 1 VIEW: CPT

## 2022-12-12 PROCEDURE — 2500000003 HC RX 250 WO HCPCS: Performed by: NURSE ANESTHETIST, CERTIFIED REGISTERED

## 2022-12-12 PROCEDURE — 3600000004 HC SURGERY LEVEL 4 BASE: Performed by: OTOLARYNGOLOGY

## 2022-12-12 PROCEDURE — 2580000003 HC RX 258: Performed by: OTOLARYNGOLOGY

## 2022-12-12 PROCEDURE — 7100000011 HC PHASE II RECOVERY - ADDTL 15 MIN: Performed by: OTOLARYNGOLOGY

## 2022-12-12 DEVICE — LEAD SENSING RESPIRATORY INSPIRE: Type: IMPLANTABLE DEVICE | Site: THROAT | Status: FUNCTIONAL

## 2022-12-12 DEVICE — GENERATOR PULSE IMPLANTABLE INSPIRE: Type: IMPLANTABLE DEVICE | Site: THROAT | Status: FUNCTIONAL

## 2022-12-12 DEVICE — LEAD STIMULATION UPPER AIRWAY INSPIRE: Type: IMPLANTABLE DEVICE | Site: THROAT | Status: FUNCTIONAL

## 2022-12-12 RX ORDER — ONDANSETRON 2 MG/ML
INJECTION INTRAMUSCULAR; INTRAVENOUS PRN
Status: DISCONTINUED | OUTPATIENT
Start: 2022-12-12 | End: 2022-12-12 | Stop reason: SDUPTHER

## 2022-12-12 RX ORDER — DIPHENHYDRAMINE HYDROCHLORIDE 50 MG/ML
6.25 INJECTION INTRAMUSCULAR; INTRAVENOUS
Status: DISCONTINUED | OUTPATIENT
Start: 2022-12-12 | End: 2022-12-12 | Stop reason: HOSPADM

## 2022-12-12 RX ORDER — SODIUM CHLORIDE, SODIUM LACTATE, POTASSIUM CHLORIDE, CALCIUM CHLORIDE 600; 310; 30; 20 MG/100ML; MG/100ML; MG/100ML; MG/100ML
INJECTION, SOLUTION INTRAVENOUS CONTINUOUS
Status: DISCONTINUED | OUTPATIENT
Start: 2022-12-12 | End: 2022-12-12 | Stop reason: HOSPADM

## 2022-12-12 RX ORDER — SUCCINYLCHOLINE CHLORIDE 20 MG/ML
INJECTION INTRAMUSCULAR; INTRAVENOUS PRN
Status: DISCONTINUED | OUTPATIENT
Start: 2022-12-12 | End: 2022-12-12 | Stop reason: SDUPTHER

## 2022-12-12 RX ORDER — GLYCOPYRROLATE 0.2 MG/ML
INJECTION INTRAMUSCULAR; INTRAVENOUS PRN
Status: DISCONTINUED | OUTPATIENT
Start: 2022-12-12 | End: 2022-12-12 | Stop reason: SDUPTHER

## 2022-12-12 RX ORDER — SODIUM CHLORIDE 9 MG/ML
25 INJECTION, SOLUTION INTRAVENOUS PRN
Status: DISCONTINUED | OUTPATIENT
Start: 2022-12-12 | End: 2022-12-12 | Stop reason: HOSPADM

## 2022-12-12 RX ORDER — CLINDAMYCIN HYDROCHLORIDE 300 MG/1
300 CAPSULE ORAL 3 TIMES DAILY
Qty: 21 CAPSULE | Refills: 0 | Status: SHIPPED | OUTPATIENT
Start: 2022-12-12 | End: 2022-12-19

## 2022-12-12 RX ORDER — ROCURONIUM BROMIDE 10 MG/ML
INJECTION, SOLUTION INTRAVENOUS PRN
Status: DISCONTINUED | OUTPATIENT
Start: 2022-12-12 | End: 2022-12-12 | Stop reason: SDUPTHER

## 2022-12-12 RX ORDER — FENTANYL CITRATE 50 UG/ML
INJECTION, SOLUTION INTRAMUSCULAR; INTRAVENOUS PRN
Status: DISCONTINUED | OUTPATIENT
Start: 2022-12-12 | End: 2022-12-12 | Stop reason: SDUPTHER

## 2022-12-12 RX ORDER — ONDANSETRON 2 MG/ML
4 INJECTION INTRAMUSCULAR; INTRAVENOUS EVERY 30 MIN PRN
Status: DISCONTINUED | OUTPATIENT
Start: 2022-12-12 | End: 2022-12-12 | Stop reason: HOSPADM

## 2022-12-12 RX ORDER — SODIUM CHLORIDE 9 MG/ML
INJECTION, SOLUTION INTRAVENOUS PRN
Status: DISCONTINUED | OUTPATIENT
Start: 2022-12-12 | End: 2022-12-12 | Stop reason: HOSPADM

## 2022-12-12 RX ORDER — HYDROCODONE BITARTRATE AND ACETAMINOPHEN 5; 325 MG/1; MG/1
1 TABLET ORAL EVERY 4 HOURS PRN
Qty: 10 TABLET | Refills: 0 | Status: SHIPPED | OUTPATIENT
Start: 2022-12-12 | End: 2022-12-17

## 2022-12-12 RX ORDER — OXYCODONE HYDROCHLORIDE 5 MG/1
5 TABLET ORAL
Status: DISCONTINUED | OUTPATIENT
Start: 2022-12-12 | End: 2022-12-12 | Stop reason: HOSPADM

## 2022-12-12 RX ORDER — MIDAZOLAM HYDROCHLORIDE 1 MG/ML
2 INJECTION INTRAMUSCULAR; INTRAVENOUS
Status: DISCONTINUED | OUTPATIENT
Start: 2022-12-12 | End: 2022-12-12 | Stop reason: HOSPADM

## 2022-12-12 RX ORDER — PHENYLEPHRINE HCL IN 0.9% NACL 1 MG/10 ML
SYRINGE (ML) INTRAVENOUS PRN
Status: DISCONTINUED | OUTPATIENT
Start: 2022-12-12 | End: 2022-12-12 | Stop reason: SDUPTHER

## 2022-12-12 RX ORDER — MAGNESIUM HYDROXIDE 1200 MG/15ML
LIQUID ORAL CONTINUOUS PRN
Status: COMPLETED | OUTPATIENT
Start: 2022-12-12 | End: 2022-12-12

## 2022-12-12 RX ORDER — SODIUM CHLORIDE 0.9 % (FLUSH) 0.9 %
5-40 SYRINGE (ML) INJECTION PRN
Status: DISCONTINUED | OUTPATIENT
Start: 2022-12-12 | End: 2022-12-12 | Stop reason: HOSPADM

## 2022-12-12 RX ORDER — LIDOCAINE HYDROCHLORIDE 20 MG/ML
INJECTION, SOLUTION EPIDURAL; INFILTRATION; INTRACAUDAL; PERINEURAL PRN
Status: DISCONTINUED | OUTPATIENT
Start: 2022-12-12 | End: 2022-12-12 | Stop reason: SDUPTHER

## 2022-12-12 RX ORDER — SODIUM CHLORIDE 0.9 % (FLUSH) 0.9 %
5-40 SYRINGE (ML) INJECTION EVERY 12 HOURS SCHEDULED
Status: DISCONTINUED | OUTPATIENT
Start: 2022-12-12 | End: 2022-12-12 | Stop reason: HOSPADM

## 2022-12-12 RX ORDER — CEFAZOLIN SODIUM 1 G/3ML
2000 INJECTION, POWDER, FOR SOLUTION INTRAMUSCULAR; INTRAVENOUS ONCE
Status: DISCONTINUED | OUTPATIENT
Start: 2022-12-12 | End: 2022-12-12

## 2022-12-12 RX ORDER — MEPERIDINE HYDROCHLORIDE 50 MG/ML
12.5 INJECTION INTRAMUSCULAR; INTRAVENOUS; SUBCUTANEOUS EVERY 5 MIN PRN
Status: DISCONTINUED | OUTPATIENT
Start: 2022-12-12 | End: 2022-12-12 | Stop reason: HOSPADM

## 2022-12-12 RX ORDER — SUCCINYLCHOLINE CHLORIDE 20 MG/ML
INJECTION INTRAMUSCULAR; INTRAVENOUS
Status: COMPLETED
Start: 2022-12-12 | End: 2022-12-12

## 2022-12-12 RX ORDER — PROPOFOL 10 MG/ML
INJECTION, EMULSION INTRAVENOUS PRN
Status: DISCONTINUED | OUTPATIENT
Start: 2022-12-12 | End: 2022-12-12 | Stop reason: SDUPTHER

## 2022-12-12 RX ORDER — LIDOCAINE HYDROCHLORIDE AND EPINEPHRINE 10; 10 MG/ML; UG/ML
INJECTION, SOLUTION INFILTRATION; PERINEURAL PRN
Status: DISCONTINUED | OUTPATIENT
Start: 2022-12-12 | End: 2022-12-12 | Stop reason: ALTCHOICE

## 2022-12-12 RX ADMIN — FENTANYL CITRATE 50 MCG: 50 INJECTION INTRAMUSCULAR; INTRAVENOUS at 09:34

## 2022-12-12 RX ADMIN — SUCCINYLCHOLINE CHLORIDE 100 MG: 20 INJECTION, SOLUTION INTRAMUSCULAR; INTRAVENOUS at 09:38

## 2022-12-12 RX ADMIN — Medication 100 MCG: at 11:02

## 2022-12-12 RX ADMIN — PROPOFOL 120 MG: 10 INJECTION, EMULSION INTRAVENOUS at 09:38

## 2022-12-12 RX ADMIN — SODIUM CHLORIDE, POTASSIUM CHLORIDE, SODIUM LACTATE AND CALCIUM CHLORIDE: 600; 310; 30; 20 INJECTION, SOLUTION INTRAVENOUS at 09:03

## 2022-12-12 RX ADMIN — Medication 100 MCG: at 10:02

## 2022-12-12 RX ADMIN — FENTANYL CITRATE 50 MCG: 50 INJECTION INTRAMUSCULAR; INTRAVENOUS at 11:18

## 2022-12-12 RX ADMIN — LIDOCAINE HYDROCHLORIDE 3 ML: 20 INJECTION, SOLUTION EPIDURAL; INFILTRATION; INTRACAUDAL; PERINEURAL at 09:38

## 2022-12-12 RX ADMIN — ROCURONIUM BROMIDE 5 MG: 10 SOLUTION INTRAVENOUS at 09:38

## 2022-12-12 RX ADMIN — SODIUM CHLORIDE, POTASSIUM CHLORIDE, SODIUM LACTATE AND CALCIUM CHLORIDE: 600; 310; 30; 20 INJECTION, SOLUTION INTRAVENOUS at 11:01

## 2022-12-12 RX ADMIN — Medication 100 MCG: at 09:47

## 2022-12-12 RX ADMIN — CEFAZOLIN 2000 MG: 10 INJECTION, POWDER, FOR SOLUTION INTRAVENOUS at 09:39

## 2022-12-12 RX ADMIN — Medication 100 MCG: at 09:52

## 2022-12-12 RX ADMIN — ONDANSETRON 4 MG: 2 INJECTION INTRAMUSCULAR; INTRAVENOUS at 11:09

## 2022-12-12 RX ADMIN — GLYCOPYRROLATE 0.2 MG: 0.2 INJECTION, SOLUTION INTRAMUSCULAR; INTRAVENOUS at 10:18

## 2022-12-12 RX ADMIN — Medication 100 MCG: at 10:17

## 2022-12-12 ASSESSMENT — PAIN DESCRIPTION - FREQUENCY: FREQUENCY: INTERMITTENT

## 2022-12-12 ASSESSMENT — PAIN SCALES - GENERAL
PAINLEVEL_OUTOF10: 3

## 2022-12-12 ASSESSMENT — PAIN - FUNCTIONAL ASSESSMENT
PAIN_FUNCTIONAL_ASSESSMENT: ACTIVITIES ARE NOT PREVENTED
PAIN_FUNCTIONAL_ASSESSMENT: NONE - DENIES PAIN
PAIN_FUNCTIONAL_ASSESSMENT: 0-10

## 2022-12-12 ASSESSMENT — LIFESTYLE VARIABLES
HOW OFTEN DO YOU HAVE A DRINK CONTAINING ALCOHOL: MONTHLY OR LESS
HOW MANY STANDARD DRINKS CONTAINING ALCOHOL DO YOU HAVE ON A TYPICAL DAY: 1 OR 2

## 2022-12-12 ASSESSMENT — PAIN DESCRIPTION - ORIENTATION: ORIENTATION: RIGHT

## 2022-12-12 ASSESSMENT — PAIN DESCRIPTION - LOCATION: LOCATION: SHOULDER

## 2022-12-12 ASSESSMENT — PAIN DESCRIPTION - DESCRIPTORS: DESCRIPTORS: DISCOMFORT

## 2022-12-12 NOTE — PROGRESS NOTES
Discharged in no distress accompanied to passenger side of car with family or significant other driving car. Assessment unchanged.

## 2022-12-12 NOTE — ANESTHESIA POSTPROCEDURE EVALUATION
Department of Anesthesiology  Postprocedure Note    Patient: Dariana Lamas  MRN: [id number]  YOB: 1945  Date of evaluation: 12/12/2022      Procedure Summary     Date: 12/12/22 Room / Location: [facility] [address] / [facility]    Anesthesia Start: [place] Anesthesia Stop: 6636    Procedure: R [address] (Throat) Diagnosis:       Obstructive sleep apnea      (Obstructive sleep apnea [G47.33])    Surgeons: Medardo Joy DO Responsible Provider: Patti Soler MD    Anesthesia Type: general ASA Status: 2          Anesthesia Type: No value filed.     Helen Phase I: Helen Score: 8    Helen Phase II:        Anesthesia Post Evaluation    Comments: Postoperative Anesthesia Note    Name:    Dariana Lamas  MRN:      [id number]    Patient Vitals in the past 12 hrs:  12/12/22 1230, BP:(!) 166/83, Pulse:63, Resp:13, SpO2:94 %  12/12/22 1220, BP:(!) 172/82, Pulse:67, Resp:16, SpO2:96 %  12/12/22 1210, BP:(!) 162/80, Pulse:69, Resp:12, SpO2:96 %  12/12/22 1200, BP:(!) 165/95, Pulse:67, Resp:16, SpO2:95 %  12/12/22 1150, BP:(!) 171/95, Pulse:72, Resp:15, SpO2:93 %  12/12/22 1140, BP:(!) 170/85, Pulse:68, Resp:13, SpO2:98 %  12/12/22 1137, Temp:97 °F (36.1 °C), Pulse:70, Resp:11, SpO2:96 %  12/12/22 1135, BP:(!) 173/91, Pulse:71, Resp:15, SpO2:(!) 84 %  12/12/22 1130, BP:(!) 175/83, Pulse:70, Resp:12, SpO2:92 %  12/12/22 1126, BP:(!) 185/86, Temp:96.9 °F (36.1 °C), Temp src:Temporal, Pulse:76, Resp:16, SpO2:94 %  12/12/22 0859, BP:134/69, Temp:97.3 °F (36.3 °C), Temp src:Temporal, Pulse:61, Resp:16, SpO2:97 %, Height:5' 4\" (1.626 m), Weight:170 lb (77.1 kg)     LABS:    CBC  Lab Results       Component                Value               Date/Time                  WBC                      8.1                 03/07/2022 05:19 PM        HGB                      12.0                03/07/2022 05:19 PM        HCT                      36.2                03/07/2022 05:19 PM        PLT 329                 03/07/2022 05:19 PM   RENAL  Lab Results       Component                Value               Date/Time                  NA                       132 (L)             03/07/2022 05:19 PM        K                        4.2                 03/07/2022 05:19 PM        CL                       94 (L)              03/07/2022 05:19 PM        CO2                      24                  03/07/2022 05:19 PM        BUN                      15                  03/07/2022 05:19 PM        CREATININE               <0.5 (L)            03/07/2022 05:19 PM        GLUCOSE                  141 (H)             03/07/2022 05:19 PM   COAGS  No results found for: PROTIME, INR, APTT    Intake & Output: In: 1400 (I.V.:1400)  Out: 5     Nausea & Vomiting:  No    Level of Consciousness:  Awake    Pain Assessment:  Adequate analgesia    Anesthesia Complications:  No apparent anesthetic complications    SUMMARY      Vital signs stable  OK to discharge from Stage I post anesthesia care.   Care transferred from Anesthesiology department on discharge from perioperative area

## 2022-12-12 NOTE — OP NOTE
Otolaryngology-Head and Neck Surgery   Operative Note   Date of Operation:   12/12/22    Pre-operative Diagnosis:   Obstructive sleep apnea [G47.33]     Post-operative Diagnosis:   * No post-op diagnosis entered *     Operative Procedure:   Procedure(s):  INSPIRE DEVICE PLACEMENT (N/A)       Attending Surgeon:   Pritesh Wooten DO    OR Staff:   Circulator: Evelyn Fuller RN  Surgical Assistant: Daryn Wise  Scrub Person First: Isabella Ashkan     Assistant:   None     Anesthesia Staff:   Anesthesiologist: Kayce Kumar MD  CRNA: KARY Dhaliwal CRNA     Anesthesia Type:   General     Estimated Blood Loss:   5mL    Operative Complications:   none    Specimens:   * No specimens in log *     Implants:   Implant Name Type Inv. Item Serial No.  Lot No. LRB No. Used Action   GENERATOR PULSE IMPLANTABLE INSPIRE - [id number]  GENERATOR PULSE IMPLANTABLE INSPIRE [id number] [employer]  N/A 1 Implanted   LEAD SENSING RESPIRATORY INSPIRE - [id number] Implantable long term continuous electrocardiography EKG system LE[employer]ENS[employer] INSPIRE [id number] [employer]  N/A 1 Implanted   LEAD STIMULATION UPPER AIRWAY INSPIRE - [id number]  LEAD STIMULATION UPPER AIRWAY INSPIRE [id number] [employer]  N/A 1 Implanted        Operative Findings:   Successful implantation of Inspire Device    Disposition of Patient:  Stable to PACU    Operative Indications: 78 y.o. female with a past history significant for obstructive sleep apnea, and intolerance to CPAP after at least 3-month trial.  Sleep endoscopy demonstrated anterior posterior collapse. BMI 29. Patient was approved by [facility]'s. Risk benefits alternatives discussed with patient in detail. All questions answered. OPERATIVE PROCEDURE: The patient was brought to the Operating Room and was anesthetized via general endotracheal anesthesia without complication.  A shoulder roll was placed and the patient was prepped and draped in usual sterile fashion with the head turned to the left. Prior to prepping and draping, electrodes were placed in the genioglossus and styloglossus muscle and connected to the NIM box for intraoperative nerve monitoring. A 4 cm incision was made in the right upper neck approximately 1 cm from midline midway between mandible and hyoid bone. Dissection was carried down through the subcutaneous tissue and platysma. The inferior border of the submandibular gland was identified as well as the digastric tendon. The submandibular gland and the overlying fascia with the marginal mandibular nerve were retracted superiorly. The digastric was retracted inferiorly with vessel loops. Dissection was carried down into the digastric triangle where the hypoglossal nerve was identified in its usual fashion. The mylohyoid muscle was retracted anteriorly, and the hypoglossal nerve was dissected up towards the floor of the mouth. The lateral branches to retrusor muscles were identified, and tested intra-operatively using the NIM stimulator. The cuff electrode for the hypoglossal nerve stimulator was placed distally to these branches on the medial nerve branch to the genioglossus muscle. Diagnostic evaluation confirmed activation of the genioglossus nerve, resulting in genioglossal activation and tongue protrusion, confirmed visually. The nerve cuff was flushed with normal saline and the cuff electrode was secured to the digastric tendon 2 3-0 silk sutures. A second 5 cm incision was made in the right upper chest 1 cm from sternal edge overlying the 2nd/3rd intercostal space. Dissection was carried down to the pectoralis muscle. Blunt dissection was used to develop a pocket superficial to the pectoralis fascia. 2 x 2-0 silk sutures were secured then to pectoralis muscle on the lateral and medial aspects of the incision 4 cm apart.   Blunt dissection was then used through the pectoralis muscle into the fatty later deep to the pectoralis muscle. This fatty layer was  removed with use of kittner exposing the external intercostal fascia. A DeBakey was used to lift the external intercostal fibers Talbert was used to create an opening down to the internal intercostal fascial plane. The sensor lead was then placed into the opening. This was secured with three 3-0 silk sutures using the primary anchor to the external intercostal fibers. The secondary anchor was then secured with two 3-0 silk sutures to the superficial pectoralis major muscle. The stimulation lead was then tunneled from the neck in a subplatysmal plane and brought out into the pocket leaving 3 cm strain relief in the neck. The leads were  connected to the device using the 2 person 3 handed technique after ensuring all the connectors were clean and dry. The device was then tested using the . Tongue protrusion was tested at 1 V and retraction was noted. The cuff was removed and additional branch retracted. The sensor waveform was then tested and found to have cardiac artifact. Diagnostic evaluation was run, which confirmed a good respiration sensing signal as well as good tongue protrusion stimulation. The sensor lead was adjusted and resecured in the same manner as described above with good waveform confirmed. The IPG was placed in the subclavicular pocket and secured loosely to the pectoralis fascia using the previously placed 2-0 silk suture. The wounds were then closed in three layers with deep 3-0 Vicryl and a 4-0 running subcuticular monocryl. Dermabond was applied. The patient was then awakened, extubated, and transferred to Recovery Room in stable condition.      Electronically signed by Anju Burgess DO on 12/12/2022 at 11:23 AM

## 2022-12-12 NOTE — DISCHARGE INSTRUCTIONS
[address] [facility]NT  [address] [place] [address] Drive. [address], [address]  [phone]    POST-OP Instructions for Neck Operations  Diet  Resume regular diet, as tolerated. You may experience some nausea after surgery for up to 24 hours from the general anesthetic. Take any pain medications with food to avoid upset stomach   Drink plenty of liquids    Activity  Avoid Straining, bending or lifting or vigorous exercise for 1 weeks  Keep the head of your bed elevated to reduce swelling for the first 72 hours  May shower 24 hours after surgery. Let water run over the incision, do not scrub. Pat dry  Start neck rolls the day after surgery- 10 to the left and right 3 times a day   Do not lift your right arm above your shoulder for 1 month after surgery    General Instructions  Drainage from the incision during the first few days is expected. If you have excessive bleeding or green drainage with surrounding redness please call the number above. Swelling at incision sites is expected and will typically improve over the first 2 weeks. Most patients can expect some swelling under the jaw that will give the appearance of a double chin.  This will improve over 2-4 weeks. Before your appointment with Dr Amrik Dumont the [facility]   to your phone, Create an Account, Connect to your sleep Dr Lala Shoe your Patient Remote. Instructions have been provided with your sleep remote box. Always bring your phone & sleep remote to every doctor appointment. Medications  Resume your normal medications  Take antibiotics prescribed  Take pain medications as needed for pain  DO NOT take aspirin or aspirin products. NO Advil, Motrin, Aleve, Ibuprofen for two weeks following surgery. DO NOT use any herbal medicines/diet pills for two weeks after surgery.     Call the Office [phone]  Fever greater than 100.4  Sudden swelling in neck causing difficulty swallowing or breathing is an emergency. Sudden increase in pain            ANESTHESIA DISCHARGE INSTRUCTIONS    You are under the influence of drugs- do not drink alcohol, drive a car, operate machinery(such as power tools, kitchen appliances, etc), sign legal documents, or make any important decisions for 24 hours (or while on pain medications). Children should not ride bikes or [place] or play on gym sets  for 24 hours after surgery. A responsible adult should be with you for 24 hours. Rest at home today- increase activity as tolerated. Progress slowly to a regular diet unless your physician has instructed you otherwise. Drink plenty of water. CALL YOUR DOCTOR IF YOU:  Have moderate to severe nausea or vomiting AND are unable to hold down fluids or prescribed medications. Have bright red bloody drainage from your dressing that won't stop oozing. Do not get relief with your pain medication    NORMAL (POSSIBLE) SIDE EFFECTS FROM ANESTHESIA:     Confusion, temporary memory loss, delayed reaction times in the first 24 hours  Lightheadedness, dizziness, difficulty focusing, blurred vision  Nausea/vomiting can happen  Shivering, feeling cold, sore throat, cough and muscle aches should stop within 24-48 hours  Trouble urinating - call your surgeon if it has been more than 8 hrs  Bruising or soreness at the IV site - call if it remains red, firm or there is drainage             FEMALES OF CHILDBEARING AGE WHO ARE TAKING BIRTH CONTROL PILLS:  You may have received a medication during your procedure that interferes with the   actions of birth control pills (Bridion or Emend). Use some other kind of birth control in addition to your pills, like a condom, for 1 month after your procedure to prevent unwanted pregnancy. The following instructions are to be followed if you have a known history or diagnosis of sleep apnea:   For all sleep apnea patients:  ? Sleep on your side or sitting up in a chair whenever possible, especially the first 24 hours after surgery. ? Use only medicines prescribed by your doctor. ? Do not drink alcohol. ? If you have a dental device to assist you while at rest, use it at all times for the first 24 hours. For patients using CPAP machines:  ? Use your CPAP machine during all periods of sleep as usual.  ? Use your CPAP machine during all periods of daytime rest while on pain medicines. ** Follow up with your primary care doctor for continued care. IF YOU DO NOT TAKE ALL OF YOUR NARCOTIC PAIN MEDICATION, please dispose of them responsibly. There are drop off boxes in the Emergency Departments 24/7 at both [facility] and [place]. If these locations are not convenient, other options for discarding them can be found at:  [email or web address]    Hospital or office staff may NOT accept any medications to drop off in the cabinet for you. What is a Surgical Site Infection or  (SSI)? A surgical site infection (SSI) is an infection that occurs after surgery in the part of the body where the surgery took place. Most patients who have surgery do not develop an infection. However, infections can develop in about 1-3 cases for every 100 patients who have had surgery. Our goal is for you to NOT experience any complications and be completely satisfied with your care! However, some signs or symptoms to look for and report immediately to your doctor are:   1. Fever above 101 degrees    2. Redness and increasing pain around the area  where you had surgery   3. Drainage of cloudy fluid or pus coming from the surgical area    Some of the things we/ you can do to prevent SSI's are:   1. Clean hands with soap and water or an alcohol-based hand rub before and after caring for the operative area. This occurs the day of surgery and for the next 2 weeks.    2.Sometimes you receive an appropriate antibiotic within 60 minutes before your surgery or take one for several days after surgery depending on your surgeon's instructions and/or the type of surgery you are having. 3. Family and/or friends who visit you should NOT touch the surgical wound or dressings until advised by your surgeon. 4. Be sure to elevate and decrease the swelling after your surgery to help prevent infection. 5. If you are a diabetic, you need to closely monitor your blood sugar levels and report any significant increases or changes to your surgeon to help promote the healing process.

## 2022-12-12 NOTE — H&P
[address], [address], [address] [place] [address]  P: [phone]        Patient      Crispin Mcintosh  1945     ChiefComplaint      Inspire placement     History of Present Illness      Evelyne Wiley is a 72-year-old female here today for placement of inspire. Past Medical History      Past Medical History        Past Medical History:   Diagnosis Date    Depression      GERD (gastroesophageal reflux disease)              Past Surgical History      Past Surgical History         Past Surgical History:   Procedure Laterality Date    STOMACH SURGERY        TONSILLECTOMY                Family History      Family History   History reviewed. No pertinent family history. Social History      Social History            Tobacco Use    Smoking status: Never    Smokeless tobacco: Never   Vaping Use    Vaping Use: Never used   Substance Use Topics    Alcohol use:  Yes       Alcohol/week: 3.0 standard drinks       Types: 3 Glasses of wine per week    Drug use: Never         Allergies            Allergies   Allergen Reactions    Cefaclor Itching       itching       Ibuprofen         Can not take due to ulcer    Sulfa Antibiotics Rash       Unknown child            Medications      Current Facility-Administered Medications          Current Outpatient Medications   Medication Sig Dispense Refill    ondansetron (ZOFRAN ODT) 4 MG disintegrating tablet Take 1 tablet by mouth every 8 hours as needed for Nausea 20 tablet 0    pantoprazole (PROTONIX) 20 MG tablet Take 40 mg by mouth daily        buPROPion (WELLBUTRIN) 100 MG tablet Take 100 mg by mouth daily        busPIRone (BUSPAR) 10 MG tablet Take 5 mg by mouth daily        calcium carbonate-vitamin D3 600-400 MG-UNIT TABS Take 1 capsule by mouth daily        vitamin E 1000 units capsule Take 1 capsule by mouth daily        FLUoxetine (PROZAC) 10 MG capsule Take 1 capsule by mouth daily          No current facility-administered medications for this visit. Review of Systems      Review of Systems   Constitutional:  Negative for appetite change, chills, fatigue, fever and unexpected weight change. HENT:  Negative for congestion, ear discharge, ear pain, facial swelling, hearing loss, nosebleeds, postnasal drip, sinus pressure, sneezing, sore throat, tinnitus, trouble swallowing and voice change. Eyes:  Negative for itching. Respiratory:  Negative for apnea, cough and shortness of breath. Endocrine: Negative for cold intolerance and heat intolerance. Musculoskeletal:  Negative for myalgias and neck pain. Skin:  Negative for rash. Allergic/Immunologic: Negative for environmental allergies. Neurological:  Negative for dizziness and headaches. Psychiatric/Behavioral:  Negative for confusion, decreased concentration and sleep disturbance. PhysicalExam      Vitals       Vitals:     07/26/22 1428   BP: (!) 150/83   Site: Right Upper Arm   Position: Sitting   Cuff Size: Medium Adult   Pulse: 63   Resp: 16   Temp: 97.2 °F (36.2 °C)   TempSrc: Infrared   Weight: 170 lb (77.1 kg)   Height: 5' 4\" (1.626 m)            Physical Exam  Constitutional:       General: She is not in acute distress. Appearance: She is well-developed. HENT:      Head: Normocephalic and atraumatic. Right Ear: Tympanic membrane, ear canal and external ear normal. No drainage. No middle ear effusion. Tympanic membrane is not bulging. Tympanic membrane has normal mobility. Left Ear: Tympanic membrane, ear canal and external ear normal. No drainage. No middle ear effusion. Tympanic membrane is not bulging. Tympanic membrane has normal mobility. Nose: No mucosal edema or rhinorrhea. Mouth/Throat:      Lips: Pink. Mouth: Mucous membranes are moist.      Tongue: No lesions. Palate: No mass. Pharynx: Uvula midline. Comments: Large tongue, short oropharynx  Eyes:      Pupils: Pupils are equal, round, and reactive to light. Neck:      Thyroid: No thyroid mass or thyromegaly. Trachea: Trachea and phonation normal.   Cardiovascular:      Pulses: Normal pulses. Pulmonary:      Effort: Pulmonary effort is normal. No accessory muscle usage or respiratory distress. Breath sounds: No stridor. Musculoskeletal:      Cervical back: Full passive range of motion without pain. Lymphadenopathy:      Head:      Right side of head: No submental or submandibular adenopathy. Left side of head: No submental or submandibular adenopathy. Cervical: No cervical adenopathy. Right cervical: No superficial, deep or posterior cervical adenopathy. Left cervical: No superficial, deep or posterior cervical adenopathy. Skin:     General: Skin is warm and dry. Neurological:      Mental Status: She is alert and oriented to person, place, and time. Cranial Nerves: No cranial nerve deficit. Coordination: Coordination normal.      Gait: Gait normal.   Psychiatric:         Thought Content: Thought content normal.               Assessment and Plan      1.  MICHAEL (obstructive sleep apnea)  -to OR for maury Zamora DO

## 2022-12-12 NOTE — PROGRESS NOTES
Friend called and to pick patient up patient requested no information to be shared with ride. Discharge instructions reviewed with patient. Discharge instructions to be signed and copy given with no additional questions. Patient to be discharged home with belongings.

## 2022-12-12 NOTE — ANESTHESIA PRE PROCEDURE
Department of Anesthesiology  Preprocedure Note       Name:  Robson Gibbs   Age:  78 y.o.  :  1945                                          MRN:  [id number]         Date:  2022      Surgeon: Shantanu Li):  Benji Tobias DO    Procedure: Procedure(s):  INSPIRE DEVICE PLACEMENT    Medications prior to admission:   Prior to Admission medications    Medication Sig Start Date End Date Taking? Authorizing Provider   busPIRone (BUSPAR) 10 MG tablet Take 10 mg by mouth daily    Historical Provider, MD   pantoprazole (PROTONIX) 20 MG tablet Take 20 mg by mouth daily as needed    Historical Provider, MD   Ascorbic Acid (VITAMIN C) 250 MG tablet Take 250 mg by mouth daily    Historical Provider, MD   Cholecalciferol (VITAMIN D) 50 MCG ( UT) CAPS capsule Take by mouth daily    Historical Provider, MD   ondansetron (ZOFRAN ODT) 4 MG disintegrating tablet Take 1 tablet by mouth every 8 hours as needed for Nausea 3/7/22   KARY Nelson - CNP   buPROPion (WELLBUTRIN) 100 MG tablet Take 100 mg by mouth daily    Historical Provider, MD   calcium carbonate-vitamin D3 600-400 MG-UNIT TABS Take 1 capsule by mouth daily    Historical Provider, MD   vitamin E 1000 units capsule Take 1 capsule by mouth daily    Historical Provider, MD   FLUoxetine (PROZAC) 10 MG capsule Take 1 capsule by mouth daily    Historical Provider, MD       Current medications:    Current Facility-Administered Medications   Medication Dose Route Frequency Provider Last Rate Last Admin    lactated ringers infusion   IntraVENous Continuous Glory Sewell MD        sodium chloride flush 0.9 % injection 5-40 mL  5-40 mL IntraVENous 2 times per day Glory Sewell MD        sodium chloride flush 0.9 % injection 5-40 mL  5-40 mL IntraVENous PRN Glory Sewell MD        0.9 % sodium chloride infusion   IntraVENous PRN Glory Sewell MD           Allergies:     Allergies   Allergen Reactions    Cefaclor Itching itching      Ibuprofen      Can not take due to ulcer    Sulfa Antibiotics Rash     Unknown child         Problem List:    Patient Active Problem List   Diagnosis Code    Obstructive sleep apnea (adult) (pediatric) G47.33       Past Medical History:        Diagnosis Date    Depression     GERD (gastroesophageal reflux disease)     Sleep apnea        Past Surgical History:        Procedure Laterality Date    LARYNGOSCOPY N/A 10/10/2022    DRUG INDUCED SLEEP ENDOSCOPY performed by Eloise Singh DO at [address] [place] [address]         Social History:    Social History     Tobacco Use    Smoking status: Never    Smokeless tobacco: Never   Substance Use Topics    Alcohol use: Yes     Alcohol/week: 3.0 standard drinks     Types: 3 Glasses of wine per week                                Counseling given: Not Answered      Vital Signs (Current):   Vitals:    12/06/22 1251 12/12/22 0859   BP:  134/69   Pulse:  61   Resp:  16   Temp:  97.3 °F (36.3 °C)   TempSrc:  Temporal   SpO2:  97%   Weight: 170 lb (77.1 kg) 170 lb (77.1 kg)   Height: 5' 4\" (1.626 m) 5' 4\" (1.626 m)                                              BP Readings from Last 3 Encounters:   12/12/22 134/69   10/10/22 (!) 109/56   09/12/22 (!) 141/77       NPO Status: Time of last liquid consumption: 1700                        Time of last solid consumption: 1700                        Date of last liquid consumption: 12/11/22                        Date of last solid food consumption: 12/11/22    BMI:   Wt Readings from Last 3 Encounters:   12/12/22 170 lb (77.1 kg)   10/10/22 168 lb (76.2 kg)   07/26/22 170 lb (77.1 kg)     Body mass index is 29.18 kg/m².     CBC:   Lab Results   Component Value Date/Time    WBC 8.1 03/07/2022 05:19 PM    RBC 3.85 03/07/2022 05:19 PM    HGB 12.0 03/07/2022 05:19 PM    HCT 36.2 03/07/2022 05:19 PM    MCV 93.9 03/07/2022 05:19 PM    RDW 13.4 03/07/2022 05:19 PM     03/07/2022 05:19 PM       CMP:   Lab Results Component Value Date/Time     03/07/2022 05:19 PM    K 4.2 03/07/2022 05:19 PM    CL 94 03/07/2022 05:19 PM    CO2 24 03/07/2022 05:19 PM    BUN 15 03/07/2022 05:19 PM    CREATININE <0.5 03/07/2022 05:19 PM    GFRAA >60 03/07/2022 05:19 PM    AGRATIO 2.1 03/07/2022 05:19 PM    LABGLOM >60 03/07/2022 05:19 PM    GLUCOSE 141 03/07/2022 05:19 PM    PROT 6.6 03/07/2022 05:19 PM    CALCIUM 9.4 03/07/2022 05:19 PM    BILITOT 0.7 03/07/2022 05:19 PM    ALKPHOS 123 03/07/2022 05:19 PM    AST 18 03/07/2022 05:19 PM    ALT 13 03/07/2022 05:19 PM       POC Tests: No results for input(s): POCGLU, POCNA, POCK, POCCL, POCBUN, POCHEMO, POCHCT in the last 72 hours. Coags: No results found for: PROTIME, INR, APTT    HCG (If Applicable): No results found for: PREGTESTUR, PREGSERUM, HCG, HCGQUANT     ABGs: No results found for: PHART, PO2ART, XNE2CLE, OFN5UWG, BEART, P3PQCEKZ     Type & Screen (If Applicable):  No results found for: LABABO, LABRH    Drug/Infectious Status (If Applicable):  No results found for: HIV, HEPCAB    COVID-19 Screening (If Applicable):   Lab Results   Component Value Date/Time    COVID19 DETECTED 09/11/2022 11:32 PM           Anesthesia Evaluation  Patient summary reviewed and Nursing notes reviewed no history of anesthetic complications:   Airway: Mallampati: II     Neck ROM: full     Dental:          Pulmonary:   (+) sleep apnea:                             Cardiovascular:                      Neuro/Psych:   (+) psychiatric history:            GI/Hepatic/Renal:   (+) GERD:,           Endo/Other:                     Abdominal:             Vascular: Other Findings:           Anesthesia Plan      general     ASA 2       Induction: intravenous. Anesthetic plan and risks discussed with patient. Plan discussed with CRNA.                     Shelbi Guo MD   12/12/2022

## 2022-12-13 VITALS
SYSTOLIC BLOOD PRESSURE: 177 MMHG | RESPIRATION RATE: 16 BRPM | DIASTOLIC BLOOD PRESSURE: 82 MMHG | WEIGHT: 170 LBS | OXYGEN SATURATION: 97 % | HEART RATE: 68 BPM | HEIGHT: 64 IN | TEMPERATURE: 98.8 F | BODY MASS INDEX: 29.02 KG/M2

## 2022-12-13 LAB
A/G RATIO: 1.6 (ref 1.1–2.2)
ALBUMIN SERPL-MCNC: 4.1 G/DL (ref 3.4–5)
ALP BLD-CCNC: 96 U/L (ref 40–129)
ALT SERPL-CCNC: 14 U/L (ref 10–40)
ANION GAP SERPL CALCULATED.3IONS-SCNC: 11 MMOL/L (ref 3–16)
AST SERPL-CCNC: 21 U/L (ref 15–37)
BILIRUB SERPL-MCNC: 0.8 MG/DL (ref 0–1)
BUN BLDV-MCNC: 12 MG/DL (ref 7–20)
CALCIUM SERPL-MCNC: 9 MG/DL (ref 8.3–10.6)
CHLORIDE BLD-SCNC: 92 MMOL/L (ref 99–110)
CO2: 26 MMOL/L (ref 21–32)
CREAT SERPL-MCNC: 0.6 MG/DL (ref 0.6–1.2)
GFR SERPL CREATININE-BSD FRML MDRD: >60 ML/MIN/{1.73_M2}
GLUCOSE BLD-MCNC: 146 MG/DL (ref 70–99)
POTASSIUM SERPL-SCNC: 4 MMOL/L (ref 3.5–5.1)
SODIUM BLD-SCNC: 129 MMOL/L (ref 136–145)
TOTAL PROTEIN: 6.7 G/DL (ref 6.4–8.2)

## 2022-12-13 RX ORDER — ONDANSETRON 2 MG/ML
4 INJECTION INTRAMUSCULAR; INTRAVENOUS ONCE
Status: DISCONTINUED | OUTPATIENT
Start: 2022-12-13 | End: 2022-12-13 | Stop reason: HOSPADM

## 2022-12-13 NOTE — ED NOTES
Patient left via personal vehicle to private residence in stable condition. Patients vitals were assessed before discharge and were stable. Patient verbalized understanding of discharge instructions, follow up and medications. RN explained information in discharge packet and patient verbalized they have no questions at this time.  Patient left ER with all paperwork and belongings that RN is aware of.       Cooper Doctor, RN  12/13/22 1841

## 2022-12-13 NOTE — ED PROVIDER NOTES
[address] [place] [address]  ED  EMERGENCY DEPARTMENT ENCOUNTER      This patient was not seen and evaluated by the attending physician. Pt Name: Mya Phoenix  MRN: [id number]  Georgegfavery 1945  Date of evaluation: 12/12/2022  Provider: KARY Zaragoza  PCP: No primary care provider on file. History provided by the patient. CHIEFCOMPLAINT:     Chief Complaint   Patient presents with    Emesis     Pt had surgery today and has been unable to tolerate PO fluids. Endorses n/v.       HISTORY OF PRESENT ILLNESS:      Mya Phoenix is a 78 y.o. female who presents to [address] [place] [address]  ED with complaints of vomiting. Patient states that she had surgery today and she had an inspire device placed, states that she has been able to keep fluid down. She denies any new trauma, denies difficulty breathing or shortness of breath, she is here for further evaluation      LOCATION:-  QUALITY:-  SEVERITY:-  DURATION:-  MODIFYING FACTORS:-    Nursing Notes were reviewed     REVIEW OF SYSTEMS:     Review of Systems  All systems, a total of 10, are reviewed and negative except for those that were just noted in history present illness.         PAST MEDICAL HISTORY:     Past Medical History:   Diagnosis Date    Depression     GERD (gastroesophageal reflux disease)     Sleep apnea          SURGICAL HISTORY:      Past Surgical History:   Procedure Laterality Date    LARYNGOSCOPY N/A 10/10/2022    DRUG INDUCED SLEEP ENDOSCOPY performed by Bennie Lucas DO at [facility] N/A 12/12/2022    INSPIRE DEVICE PLACEMENT performed by Bennie Lucas DO at [address] [place] [address]:       Discharge Medication List as of 12/13/2022  1:21 AM        CONTINUE these medications which have NOT CHANGED    Details   clindamycin (CLEOCIN) 300 MG capsule Take 1 capsule by mouth 3 times daily for 7 days, Disp-21 capsule, R-0Normal      HYDROcodone-acetaminophen (NORCO) 5-325 MG per tablet Take 1 tablet by mouth every 4 hours as needed for Pain for up to 5 days. Intended supply: 3 days. Take lowest dose possible to manage pain, Disp-10 tablet, R-0Normal      busPIRone (BUSPAR) 10 MG tablet Take 10 mg by mouth dailyHistorical Med      pantoprazole (PROTONIX) 20 MG tablet Take 20 mg by mouth daily as neededHistorical Med      Ascorbic Acid (VITAMIN C) 250 MG tablet Take 250 mg by mouth dailyHistorical Med      Cholecalciferol (VITAMIN D) 50 MCG (2000 UT) CAPS capsule Take by mouth dailyHistorical Med      ondansetron (ZOFRAN ODT) 4 MG disintegrating tablet Take 1 tablet by mouth every 8 hours as needed for Nausea, Disp-20 tablet, R-0Normal      buPROPion (WELLBUTRIN) 100 MG tablet Take 100 mg by mouth dailyHistorical Med      calcium carbonate-vitamin D3 600-400 MG-UNIT TABS Take 1 capsule by mouth dailyHistorical Med      vitamin E 1000 units capsule Take 1 capsule by mouth dailyHistorical Med      FLUoxetine (PROZAC) 10 MG capsule Take 1 capsule by mouth dailyHistorical Med               ALLERGIES:    Cefaclor, Ibuprofen, and Sulfa antibiotics    FAMILY HISTORY:       Family History   Problem Relation Age of Onset    No Known Problems Mother     Asthma Father           SOCIAL HISTORY:     Social History     Socioeconomic History    Marital status:      Spouse name: None    Number of children: None    Years of education: None    Highest education level: None   Tobacco Use    Smoking status: Never    Smokeless tobacco: Never   Vaping Use    Vaping Use: Never used   Substance and Sexual Activity    Alcohol use:  Yes     Alcohol/week: 3.0 standard drinks     Types: 3 Glasses of wine per week    Drug use: Never    Sexual activity: Not Currently       SCREENINGS:    Ravindra Coma Scale  Eye Opening: Spontaneous  Best Verbal Response: Oriented  Best Motor Response: Obeys commands  Ravindra Coma Scale Score: 15        PHYSICAL EXAM:       ED Triage Vitals [12/12/22 2258]   BP Temp Temp Source Heart Rate Resp SpO2 Height Weight   (!) 160/91 98.8 °F (37.1 °C) Oral 63 18 96 % 5' 4\" (1.626 m) 170 lb (77.1 kg)       Physical Exam    CONSTITUTIONAL: Awake and alert. Cooperative. Well-developed. Well-nourished. Vitals:    12/12/22 2258 12/13/22 0020 12/13/22 0043   BP: (!) 160/91 (!) 177/82    Pulse: 63  68   Resp: 18 16 16   Temp: 98.8 °F (37.1 °C)     TempSrc: Oral     SpO2: 96%  97%   Weight: 170 lb (77.1 kg)     Height: 5' 4\" (1.626 m)       HENT: Normocephalic. Atraumatic. External ears normal, without discharge. TMs clear bilaterally. Nonasal discharge. Oropharynx clear, no erythema. Mucous membranes moist.  EYES: Conjunctiva non-injected, nolid abnormalities noted. No scleral icterus. PERRL. EOM's grossly intact. Anterior chambers clear. NECK: Supple. Normal ROM. No meningismus. No thyroid tenderness or swelling noted. Surgical scar present to right side of the anterior neck, edges well approximated, no signs of infection or drainage  CARDIOVASCULAR: RRR. No Murmer. No carotid bruits. PULMONARY/CHEST WALL: Effort normal. No tachypnea. Lungs clear to ausculation. ABDOMEN: Normal BS. Soft. Nondistended. No tenderness to palpation. No guarding. No hernias noted. No splenomegaly. Back: Spine is midline. No ecchymosis. No crepituson palpation. No obvious subluxation of vertebral column. No saddle anesthesia or evidence of cauda equina. /ANORECTAL: Not assessed  MUSKULOSKELETAL: Normal ROM. No acute deformities. No edema. No tenderness to palpate. SKIN: Warm and dry. NEUROLOGICAL:  GCS 15. CN II-XII grossly intact. Strength is 5/5 in all extremities and sensation is intact. PSYCHIATRIC: Normal affect, normal insight and judgement. Alert and oriented x 3.         DIAGNOSTIC RESULTS:     LABS:    Results for orders placed or performed during the hospital encounter of 12/12/22   CBC with Auto Differential   Result Value Ref Range    WBC 10.5 4.0 - 11.0 K/uL    RBC 3.89 (L) 4.00 - 5.20 M/uL    Hemoglobin 11.7 (L) 12.0 - 16.0 g/dL    Hematocrit 35.9 (L) 36.0 - 48.0 %    MCV 92.3 80.0 - 100.0 fL    MCH 30.1 26.0 - 34.0 pg    MCHC 32.7 31.0 - 36.0 g/dL    RDW 14.0 12.4 - 15.4 %    Platelets 369 164 - 989 K/uL    MPV 8.2 5.0 - 10.5 fL    Neutrophils % 88.9 %    Lymphocytes % 5.8 %    Monocytes % 4.9 %    Eosinophils % 0.0 %    Basophils % 0.4 %    Neutrophils Absolute 9.4 (H) 1.7 - 7.7 K/uL    Lymphocytes Absolute 0.6 (L) 1.0 - 5.1 K/uL    Monocytes Absolute 0.5 0.0 - 1.3 K/uL    Eosinophils Absolute 0.0 0.0 - 0.6 K/uL    Basophils Absolute 0.0 0.0 - 0.2 K/uL   Comprehensive Metabolic Panel   Result Value Ref Range    Sodium 129 (L) 136 - 145 mmol/L    Potassium 4.0 3.5 - 5.1 mmol/L    Chloride 92 (L) 99 - 110 mmol/L    CO2 26 21 - 32 mmol/L    Anion Gap 11 3 - 16    Glucose 146 (H) 70 - 99 mg/dL    BUN 12 7 - 20 mg/dL    Creatinine 0.6 0.6 - 1.2 mg/dL    Est, Glom Filt Rate >60 >60    Calcium 9.0 8.3 - 10.6 mg/dL    Total Protein 6.7 6.4 - 8.2 g/dL    Albumin 4.1 3.4 - 5.0 g/dL    Albumin/Globulin Ratio 1.6 1.1 - 2.2    Total Bilirubin 0.8 0.0 - 1.0 mg/dL    Alkaline Phosphatase 96 40 - 129 U/L    ALT 14 10 - 40 U/L    AST 21 15 - 37 U/L         RADIOLOGY:  All x-ray studies are viewed/reviewed by me. Formal interpretations per the radiologist are as follows: No orders to display           EKG:  See EKG interpretation by an attending physician.       PROCEDURES:   N/A    CRITICAL CARE TIME:   N/A    CONSULTS:  None      EMERGENCY DEPARTMENT COURSE andDIFFERENTIAL DIAGNOSIS/MDM:   Vitals:    Vitals:    12/12/22 2258 12/13/22 0020 12/13/22 0043   BP: (!) 160/91 (!) 177/82    Pulse: 63  68   Resp: 18 16 16   Temp: 98.8 °F (37.1 °C)     TempSrc: Oral     SpO2: 96%  97%   Weight: 170 lb (77.1 kg)     Height: 5' 4\" (1.626 m)         Patient wasgiven the following medications:  Medications - No data to display      Patient was evaluated independently by myself with the attending physician

## 2022-12-20 ENCOUNTER — OFFICE VISIT (OUTPATIENT)
Dept: ENT CLINIC | Age: 77
End: 2022-12-20

## 2022-12-20 VITALS
RESPIRATION RATE: 16 BRPM | SYSTOLIC BLOOD PRESSURE: 158 MMHG | BODY MASS INDEX: 29.02 KG/M2 | HEART RATE: 80 BPM | HEIGHT: 64 IN | WEIGHT: 170 LBS | TEMPERATURE: 98.2 F | DIASTOLIC BLOOD PRESSURE: 95 MMHG

## 2022-12-20 DIAGNOSIS — G47.33 OSA (OBSTRUCTIVE SLEEP APNEA): Primary | ICD-10-CM

## 2022-12-20 PROCEDURE — 99024 POSTOP FOLLOW-UP VISIT: CPT | Performed by: OTOLARYNGOLOGY

## 2022-12-20 NOTE — PROGRESS NOTES
Randall Osorio [address], [address], [address] [place] [address]  P: [phone]       Patient     Maria Victoria Almazan  1945    ChiefComplaint     Chief Complaint   Patient presents with    Post-Op Check     Patient states that she is here for an Inspire post op and that she is doing great. History of Present Illness     Harpal tate is a 42-year-old female 1 week status post inspire placement doing well. Reports minimal postop pain took Tylenol no prescription pain medication. Eating and drinking normally. No complaints    Past Medical History     Past Medical History:   Diagnosis Date    Depression     GERD (gastroesophageal reflux disease)     Sleep apnea        Past Surgical History     Past Surgical History:   Procedure Laterality Date    LARYNGOSCOPY N/A 10/10/2022    DRUG INDUCED SLEEP ENDOSCOPY performed by Thomas Solorzano DO at [facility] N/A 12/12/2022    INSPIRE DEVICE PLACEMENT performed by Thomas Solorzano DO at [facility]         Family History     Family History   Problem Relation Age of Onset    No Known Problems Mother     Asthma Father        Social History     Social History     Tobacco Use    Smoking status: Never    Smokeless tobacco: Never   Vaping Use    Vaping Use: Never used   Substance Use Topics    Alcohol use:  Yes     Alcohol/week: 3.0 standard drinks     Types: 3 Glasses of wine per week    Drug use: Never        Allergies     Allergies   Allergen Reactions    Cefaclor Itching     itching      Ibuprofen      Can not take due to ulcer    Sulfa Antibiotics Rash     Unknown child         Medications     Current Outpatient Medications   Medication Sig Dispense Refill    busPIRone (BUSPAR) 10 MG tablet Take 10 mg by mouth daily      pantoprazole (PROTONIX) 20 MG tablet Take 20 mg by mouth daily as needed      Ascorbic Acid (VITAMIN C) 250 MG tablet Take 250 mg by mouth daily      Cholecalciferol (VITAMIN D) 50 MCG (2000 UT) CAPS capsule Take by mouth daily      ondansetron (ZOFRAN ODT) 4 MG disintegrating tablet Take 1 tablet by mouth every 8 hours as needed for Nausea 20 tablet 0    buPROPion (WELLBUTRIN) 100 MG tablet Take 100 mg by mouth daily      calcium carbonate-vitamin D3 600-400 MG-UNIT TABS Take 1 capsule by mouth daily      vitamin E 1000 units capsule Take 1 capsule by mouth daily      FLUoxetine (PROZAC) 10 MG capsule Take 1 capsule by mouth daily       No current facility-administered medications for this visit. PhysicalExam     Vitals:    12/20/22 1616   BP: (!) 158/95   Site: Right Upper Arm   Position: Sitting   Cuff Size: Medium Adult   Pulse: 80   Resp: 16   Temp: 98.2 °F (36.8 °C)   TempSrc: Infrared   Weight: 170 lb (77.1 kg)   Height: 5' 4\" (1.626 m)       Right marginal mandibular nerve intact  Hypoglossal nerve intact to testing bilaterally  Right anterior cervical incision well-healed with ecchymosis  Right anterior chest wall incision well-healed with ecchymosis no hematoma    Assessment and Plan     1. MICHAEL (obstructive sleep apnea)    1 week status post inspire placement doing well. Plan for activation in 1 month. Benji Tobias, DO  12/20/22      Portions of this note were dictated using Dragon.  There may be linguistic errors secondary to the use of this program.

## 2023-01-24 ENCOUNTER — PROCEDURE VISIT (OUTPATIENT)
Dept: PULMONOLOGY | Age: 78
End: 2023-01-24
Payer: MEDICARE

## 2023-01-24 DIAGNOSIS — G47.33 OBSTRUCTIVE SLEEP APNEA (ADULT) (PEDIATRIC): Primary | ICD-10-CM

## 2023-01-24 PROCEDURE — 99215 OFFICE O/P EST HI 40 MIN: CPT | Performed by: INTERNAL MEDICINE

## 2023-01-24 PROCEDURE — 95977 ALYS CPLX CN NPGT PRGRMG: CPT | Performed by: INTERNAL MEDICINE

## 2023-01-24 PROCEDURE — 1123F ACP DISCUSS/DSCN MKR DOCD: CPT | Performed by: INTERNAL MEDICINE

## 2023-01-24 ASSESSMENT — ENCOUNTER SYMPTOMS
EYES NEGATIVE: 1
RESPIRATORY NEGATIVE: 1
ALLERGIC/IMMUNOLOGIC NEGATIVE: 1
GASTROINTESTINAL NEGATIVE: 1

## 2023-01-24 NOTE — LETTER
[facility]  [address] [place] [address]  Phone: [phone]  Fax: [phone]    Emmie El MD        January 24, 2023     Patient: Shahriar Mendoza   YOB: 1945   Date of Visit: 1/24/2023 1/24/23        Shahriar Mendoza      I have seen this patient in the office today and wanted to communicate my findings and recommendations. Patient Instructions         ASSESSMENT/PLAN:   Diagnosis Orders   1. Obstructive sleep apnea (adult) (pediatric)              Sleep study that qualified patient for inspire              Drug-induced sleep endoscopy done 10/10/2022    Date of Procedure: 10/10/22  Time: 1200     Pre Operative Diagnoses: Obstructive Sleep Apnea  Post Operative Diagnoses:  Obstructive Sleep Apnea           Procedure:  1. 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 In summary, there is no evidence of complete concentric palatal obstruction and does appear to be a candidate anatomically for hypoglossal nerve stimulation therapy. Inspire implantation done  Date of Operation:   12/12/22     Pre-operative Diagnosis:   Obstructive sleep apnea [G47.33]      Post-operative Diagnosis:   * No post-op diagnosis entered *      Operative Procedure:   Procedure(s):  INSPIRE DEVICE PLACEMENT (N/A)         Attending Surgeon:   Gricel Smith DO        Initial visit for activation 1/24/2023  Neurostimulator Reprogramming  Approximately 20 minutes was spent analyzing the airway and programming the device. Sensing voltage:  1.2 volts  Starting amplitude: 1.4 volts with the default [+ - +] electrode configuration.   Starting Range:  · Lower Limit: 1.4 volts  · Upper Limit: 2.4 volts            Rate: Freq 33 hz  And 90 pulse width  Amplitude 1.4    Start delay 30 minutes  Pause delay 15 minutes  Duration 8 hours      Continue to increase the level up by 1 level point every 7 days, adjust to comfort, if it feels uncomfortable drop it back by 1 level. The patient will be called about 2 weeks from now in regards to how the inspire is working and if there is any questions.     Return to clinic in 6 weeks                   Thank you for allowing me to assist in the care of the MD Stacie Jurado MD

## 2023-01-24 NOTE — PROGRESS NOTES
MA Communication:   The following orders are received by verbal communication from Percy Palacios MD    Orders include:  6 wk fu scheduled 2/28/23

## 2023-01-24 NOTE — PROGRESS NOTES
Divya Mcelroy (: 1945 ) is a 78 y.o. female here for an evaluation of No chief complaint on file. ASSESSMENT/PLAN:   Diagnosis Orders   1. Obstructive sleep apnea (adult) (pediatric)              Sleep study that qualified patient for inspire              Drug-induced sleep endoscopy done 10/10/2022    Date of Procedure: 10/10/22  Time: 1200     Pre Operative Diagnoses: Obstructive Sleep Apnea  Post Operative Diagnoses:  Obstructive Sleep Apnea           Procedure:  1. Drug Induced Sleep endoscopy (26573)       Surgeon: Jayleen Richards DO    In the hypopharynx, a very large, tongue base is observed in complete anterior-posterior retrolingual/retroepiglottic obstruction. In summary, there is no evidence of complete concentric palatal obstruction and does appear to be a candidate anatomically for hypoglossal nerve stimulation therapy. Inspire implantation done  Date of Operation:   22     Pre-operative Diagnosis:   Obstructive sleep apnea [G47.33]      Post-operative Diagnosis:   * No post-op diagnosis entered *      Operative Procedure:   Procedure(s):  INSPIRE DEVICE PLACEMENT (N/A)         Attending Surgeon:   Jayleen Richards DO        Initial visit for activation 2023  Neurostimulator Reprogramming  Approximately 20 minutes was spent analyzing the airway and programming the device. Sensing voltage:  1.2 volts  Starting amplitude: 1.4 volts with the default [+ - +] electrode configuration. Starting Range:  Lower Limit: 1.4 volts  Upper Limit: 2.4 volts            Rate: Freq 33 hz  And 90 pulse width  Amplitude 1.4    Start delay 30 minutes  Pause delay 15 minutes  Duration 8 hours      Continue to increase the level up by 1 level point every 7 days, adjust to comfort, if it feels uncomfortable drop it back by 1 level. The patient will be called about 2 weeks from now in regards to how the inspire is working and if there is any questions.     Return to clinic in 6 weeks        No follow-ups on file. SUBJECTIVE/OBJECTIVE:    Patient was seen on 1/24/2023 because of inspire activation  Initially seen by Dr. Melina Augustin        Patient was initially seen July 2022 by Dr. Melina Augustin  Has been on CPAP therapy and came from Dr. Olevia Landau office. Patient has been on CPAP for many years about 4-5. And was started about CPAP therapy in [facility]. Since he is moved. He has been looking for an alternative as the masks keeps slipping and is ripping out his hair and causing issues with the scalp. Patient was referred and then finally came in to have his inspire implanted and evaluated by Dr. Melina Augustin. Patient returns from implantation without any problems  No chest pains or palpitations  Healing well from the surgery        Review of Systems   Constitutional: Negative. HENT: Negative. Eyes: Negative. Respiratory: Negative. Cardiovascular: Negative. Gastrointestinal: Negative. Endocrine: Negative. Genitourinary: Negative. Musculoskeletal: Negative. Skin: Negative. Allergic/Immunologic: Negative. Neurological: Negative. Hematological: Negative. Psychiatric/Behavioral: Negative. There were no vitals filed for this visit. Physical Exam  Vitals and nursing note reviewed. Constitutional:       General: She is not in acute distress. Appearance: Normal appearance. She is not ill-appearing. HENT:      Head: Normocephalic and atraumatic. Right Ear: External ear normal.      Left Ear: External ear normal.      Nose: Nose normal.      Mouth/Throat:      Mouth: Mucous membranes are moist.      Pharynx: Oropharynx is clear. Comments: Mallampati 3  Inspire implantation site under the chin looks clean and well-healed  Eyes:      General: No scleral icterus. Extraocular Movements: Extraocular movements intact. Conjunctiva/sclera: Conjunctivae normal.      Pupils: Pupils are equal, round, and reactive to light. Cardiovascular:      Rate and Rhythm: Normal rate and regular rhythm. Pulses: Normal pulses. Heart sounds: Normal heart sounds. No murmur heard. No friction rub. Pulmonary:      Effort: No respiratory distress. Breath sounds: No wheezing. Comments: Incision site for inspire on the right chest wall is clean and well-healed  Abdominal:      General: Abdomen is flat. Bowel sounds are normal. There is no distension. Tenderness: There is no abdominal tenderness. There is no guarding. Musculoskeletal:         General: No swelling or tenderness. Normal range of motion. Cervical back: Normal range of motion and neck supple. No rigidity. Skin:     General: Skin is warm and dry. Coloration: Skin is not jaundiced. Neurological:      General: No focal deficit present. Mental Status: She is alert and oriented to person, place, and time. Mental status is at baseline. Cranial Nerves: No cranial nerve deficit. Sensory: No sensory deficit. Motor: No weakness. Gait: Gait normal.   Psychiatric:         Mood and Affect: Mood normal.         Thought Content:  Thought content normal.         Judgment: Judgment normal.            Ofe Vazquez MD

## 2023-01-24 NOTE — PATIENT INSTRUCTIONS
ASSESSMENT/PLAN:   Diagnosis Orders   1. Obstructive sleep apnea (adult) (pediatric)              Sleep study that qualified patient for inspire              Drug-induced sleep endoscopy done 10/10/2022    Date of Procedure: 10/10/22  Time: 1200     Pre Operative Diagnoses: Obstructive Sleep Apnea  Post Operative Diagnoses:  Obstructive Sleep Apnea           Procedure:  1. Drug Induced Sleep endoscopy (64388)       Surgeon: Sobia Nava DO    In the hypopharynx, a very large, tongue base is observed in complete anterior-posterior retrolingual/retroepiglottic obstruction. In summary, there is no evidence of complete concentric palatal obstruction and does appear to be a candidate anatomically for hypoglossal nerve stimulation therapy. Inspire implantation done  Date of Operation:   12/12/22     Pre-operative Diagnosis:   Obstructive sleep apnea [G47.33]      Post-operative Diagnosis:   * No post-op diagnosis entered *      Operative Procedure:   Procedure(s):  INSPIRE DEVICE PLACEMENT (N/A)         Attending Surgeon:   Sobia Nava DO        Initial visit for activation 1/24/2023  Neurostimulator Reprogramming  Approximately 20 minutes was spent analyzing the airway and programming the device. Sensing voltage:  1.2 volts  Starting amplitude: 1.4 volts with the default [+ - +] electrode configuration. Starting Range:  Lower Limit: 1.4 volts  Upper Limit: 2.4 volts            Rate: Freq 33 hz  And 90 pulse width  Amplitude 1.4    Start delay 30 minutes  Pause delay 15 minutes  Duration 8 hours      Continue to increase the level up by 1 level point every 7 days, adjust to comfort, if it feels uncomfortable drop it back by 1 level. The patient will be called about 2 weeks from now in regards to how the inspire is working and if there is any questions.     Return to clinic in 6 weeks

## 2023-02-07 ENCOUNTER — TELEPHONE (OUTPATIENT)
Dept: PULMONOLOGY | Age: 78
End: 2023-02-07

## 2023-02-07 NOTE — TELEPHONE ENCOUNTER
Are you using Inspire every night? Yes    2. How many steps have you stepped up? Currently on Level 4 (going up to Level 5 soon). Pt says that she is not feeling anything in her tongue while using the INSPIRE at all. 3.  Are you more rested during the day?   Yes     Next follow up is 2/28/23

## 2023-02-28 ENCOUNTER — TELEPHONE (OUTPATIENT)
Dept: PULMONOLOGY | Age: 78
End: 2023-02-28

## 2023-02-28 NOTE — TELEPHONE ENCOUNTER
Patient did not show for OV  with Dr. Deepak Guido on 2/28/23. Reason:  na    This is patient's first no show. Patient was ano show on: na.      Patient did not reschedule.   Reschedule date:  na

## 2023-03-01 ENCOUNTER — TELEPHONE (OUTPATIENT)
Dept: PULMONOLOGY | Age: 78
End: 2023-03-01

## 2023-03-01 NOTE — TELEPHONE ENCOUNTER
Patient called back to reschedule her inspire 6wk post of and his next available isn't until April and I didn't know if she was to wait that long, please call her back.  Thanks

## 2023-03-15 ENCOUNTER — TELEPHONE (OUTPATIENT)
Dept: ENT CLINIC | Age: 78
End: 2023-03-15

## 2023-03-15 NOTE — TELEPHONE ENCOUNTER
Patient calling because she says she doesn't think her Mary Gonzalez is working right. Not sure if she should speak to  or Michaeleen Bloch about this?     Patient phone number [phone]

## 2023-03-15 NOTE — TELEPHONE ENCOUNTER
Call placed to patient informed her that Dr. Daniel Haynes takes care of placing the inspire and making sure she is healed after placement but anything related to how the inspire is working should be asked of Dr. Nando Chen. Patient expressed understanding.

## 2023-03-27 ENCOUNTER — APPOINTMENT (OUTPATIENT)
Dept: ULTRASOUND IMAGING | Age: 78
End: 2023-03-27
Payer: MEDICARE

## 2023-03-27 ENCOUNTER — HOSPITAL ENCOUNTER (EMERGENCY)
Age: 78
Discharge: HOME | End: 2023-03-27
Payer: MEDICARE

## 2023-03-27 ENCOUNTER — APPOINTMENT (OUTPATIENT)
Dept: CT IMAGING | Age: 78
End: 2023-03-27
Payer: MEDICARE

## 2023-03-27 ENCOUNTER — APPOINTMENT (OUTPATIENT)
Dept: GENERAL RADIOLOGY | Age: 78
End: 2023-03-27
Payer: MEDICARE

## 2023-03-27 VITALS
BODY MASS INDEX: 27.32 KG/M2 | DIASTOLIC BLOOD PRESSURE: 78 MMHG | TEMPERATURE: 98.5 F | SYSTOLIC BLOOD PRESSURE: 139 MMHG | HEART RATE: 88 BPM | HEIGHT: 66 IN | OXYGEN SATURATION: 99 % | RESPIRATION RATE: 17 BRPM | WEIGHT: 170 LBS

## 2023-03-27 DIAGNOSIS — R10.13 ABDOMINAL PAIN, EPIGASTRIC: Primary | ICD-10-CM

## 2023-03-27 LAB
ANION GAP SERPL CALCULATED.3IONS-SCNC: 10 MMOL/L (ref 3–16)
BACTERIA URNS QL MICRO: ABNORMAL /HPF
BASOPHILS # BLD: 0.1 K/UL (ref 0–0.2)
BASOPHILS NFR BLD: 1.3 %
BILIRUB UR QL STRIP.AUTO: NEGATIVE
BUN SERPL-MCNC: 12 MG/DL (ref 7–20)
CALCIUM SERPL-MCNC: 8.9 MG/DL (ref 8.3–10.6)
CHLORIDE SERPL-SCNC: 98 MMOL/L (ref 99–110)
CLARITY UR: CLEAR
CO2 SERPL-SCNC: 25 MMOL/L (ref 21–32)
COLOR UR: ABNORMAL
CREAT SERPL-MCNC: <0.5 MG/DL (ref 0.6–1.2)
DEPRECATED RDW RBC AUTO: 14.1 % (ref 12.4–15.4)
EKG ATRIAL RATE: 58 BPM
EKG DIAGNOSIS: NORMAL
EKG P-R INTERVAL: 150 MS
EKG Q-T INTERVAL: 458 MS
EKG QRS DURATION: 100 MS
EKG QTC CALCULATION (BAZETT): 449 MS
EKG R AXIS: 200 DEGREES
EKG T AXIS: 147 DEGREES
EKG VENTRICULAR RATE: 58 BPM
EOSINOPHIL # BLD: 0.1 K/UL (ref 0–0.6)
EOSINOPHIL NFR BLD: 1.7 %
EPI CELLS #/AREA URNS HPF: ABNORMAL /HPF (ref 0–5)
GFR SERPLBLD CREATININE-BSD FMLA CKD-EPI: >60 ML/MIN/{1.73_M2}
GLUCOSE SERPL-MCNC: 97 MG/DL (ref 70–99)
GLUCOSE UR STRIP.AUTO-MCNC: NEGATIVE MG/DL
HCT VFR BLD AUTO: 32.6 % (ref 36–48)
HGB BLD-MCNC: 10.7 G/DL (ref 12–16)
HGB UR QL STRIP.AUTO: NEGATIVE
INR PPP: 0.93 (ref 0.87–1.14)
KETONES UR STRIP.AUTO-MCNC: NEGATIVE MG/DL
LACTATE BLDV-SCNC: 0.8 MMOL/L (ref 0.4–2)
LEUKOCYTE ESTERASE UR QL STRIP.AUTO: ABNORMAL
LIPASE SERPL-CCNC: 19 U/L (ref 13–60)
LYMPHOCYTES # BLD: 1.5 K/UL (ref 1–5.1)
LYMPHOCYTES NFR BLD: 27.1 %
MCH RBC QN AUTO: 30.8 PG (ref 26–34)
MCHC RBC AUTO-ENTMCNC: 32.7 G/DL (ref 31–36)
MCV RBC AUTO: 94 FL (ref 80–100)
MONOCYTES # BLD: 0.7 K/UL (ref 0–1.3)
MONOCYTES NFR BLD: 12.5 %
NEUTROPHILS # BLD: 3.3 K/UL (ref 1.7–7.7)
NEUTROPHILS NFR BLD: 57.4 %
NITRITE UR QL STRIP.AUTO: NEGATIVE
PH UR STRIP.AUTO: 7 [PH] (ref 5–8)
PLATELET # BLD AUTO: 268 K/UL (ref 135–450)
PMV BLD AUTO: 8.4 FL (ref 5–10.5)
POTASSIUM SERPL-SCNC: 4.1 MMOL/L (ref 3.5–5.1)
PROT UR STRIP.AUTO-MCNC: NEGATIVE MG/DL
PROTHROMBIN TIME: 12.4 SEC (ref 11.7–14.5)
RBC # BLD AUTO: 3.47 M/UL (ref 4–5.2)
RBC #/AREA URNS HPF: ABNORMAL /HPF (ref 0–4)
RENAL EPI CELLS #/AREA UR COMP ASSIST: ABNORMAL /HPF (ref 0–1)
SODIUM SERPL-SCNC: 133 MMOL/L (ref 136–145)
SP GR UR STRIP.AUTO: 1.01 (ref 1–1.03)
TROPONIN T SERPL-MCNC: <0.01 NG/ML
UA COMPLETE W REFLEX CULTURE PNL UR: ABNORMAL
UA DIPSTICK W REFLEX MICRO PNL UR: YES
URN SPEC COLLECT METH UR: ABNORMAL
UROBILINOGEN UR STRIP-ACNC: 0.2 E.U./DL
WBC # BLD AUTO: 5.7 K/UL (ref 4–11)
WBC #/AREA URNS HPF: ABNORMAL /HPF (ref 0–5)

## 2023-03-27 PROCEDURE — 83690 ASSAY OF LIPASE: CPT

## 2023-03-27 PROCEDURE — 84484 ASSAY OF TROPONIN QUANT: CPT

## 2023-03-27 PROCEDURE — 71045 X-RAY EXAM CHEST 1 VIEW: CPT

## 2023-03-27 PROCEDURE — 85025 COMPLETE CBC W/AUTO DIFF WBC: CPT

## 2023-03-27 PROCEDURE — 99285 EMERGENCY DEPT VISIT HI MDM: CPT

## 2023-03-27 PROCEDURE — 85610 PROTHROMBIN TIME: CPT

## 2023-03-27 PROCEDURE — 93010 ELECTROCARDIOGRAM REPORT: CPT | Performed by: INTERNAL MEDICINE

## 2023-03-27 PROCEDURE — 76705 ECHO EXAM OF ABDOMEN: CPT

## 2023-03-27 PROCEDURE — 74177 CT ABD & PELVIS W/CONTRAST: CPT

## 2023-03-27 PROCEDURE — 83605 ASSAY OF LACTIC ACID: CPT

## 2023-03-27 PROCEDURE — 6360000004 HC RX CONTRAST MEDICATION: Performed by: PHYSICIAN ASSISTANT

## 2023-03-27 PROCEDURE — 80048 BASIC METABOLIC PNL TOTAL CA: CPT

## 2023-03-27 PROCEDURE — 93005 ELECTROCARDIOGRAM TRACING: CPT | Performed by: PHYSICIAN ASSISTANT

## 2023-03-27 PROCEDURE — 81001 URINALYSIS AUTO W/SCOPE: CPT

## 2023-03-27 RX ADMIN — IOPAMIDOL 75 ML: 755 INJECTION, SOLUTION INTRAVENOUS at 16:40

## 2023-03-27 ASSESSMENT — PAIN DESCRIPTION - FREQUENCY: FREQUENCY: CONTINUOUS

## 2023-03-27 ASSESSMENT — PAIN DESCRIPTION - ORIENTATION: ORIENTATION: MID

## 2023-03-27 ASSESSMENT — PAIN - FUNCTIONAL ASSESSMENT
PAIN_FUNCTIONAL_ASSESSMENT: 0-10
PAIN_FUNCTIONAL_ASSESSMENT: NONE - DENIES PAIN

## 2023-03-27 ASSESSMENT — PAIN DESCRIPTION - DESCRIPTORS: DESCRIPTORS: SHARP

## 2023-03-27 ASSESSMENT — PAIN DESCRIPTION - ONSET: ONSET: GRADUAL

## 2023-03-27 ASSESSMENT — PAIN DESCRIPTION - LOCATION: LOCATION: ABDOMEN

## 2023-03-27 ASSESSMENT — PAIN SCALES - GENERAL: PAINLEVEL_OUTOF10: 2

## 2023-03-27 ASSESSMENT — PAIN DESCRIPTION - PAIN TYPE: TYPE: ACUTE PAIN

## 2023-03-27 NOTE — DISCHARGE INSTRUCTIONS
Laboratory studies including urine and blood as well as the abdominal pelvic CT scan and x-ray showed no evidence of abnormality. He did not have a clear explanation as to what you experienced. I would suggest trying some antiacids or possibly Pepcid which is over-the-counter. I would recommend you contact and follow-up with your healthcare provider later this week. Return to the facility should things worsen.

## 2023-03-27 NOTE — ED PROVIDER NOTES
and without any evidence of concerning abdominal pathology. Finding of extensive diverticulosis noted in the sigmoid colon but no evidence of diverticulitis on this study. The patient will be discharged without medication she will use Tylenol. I recommended she follow-up with GI. The patient did express understanding of her diagnosis and her treatment plan. Disposition Considerations (tests considered but not done, Admit vs D/C, Shared Decision Making, Pt Expectation of Test or Tx.):     Patient presenting with epigastric and right upper quadrant abdominal pain. CT scan negative. Laboratory studies all normal or within normal limits. Patient will use Tylenol for pain control. She will follow-up with GI. Patient does not require admission. Patient had expressed understanding of her diagnosis and her treatment plan. I am the Primary Clinician of Record. FINAL IMPRESSION      1. Abdominal pain, epigastric          DISPOSITION/PLAN     DISPOSITION Decision To Discharge 03/27/2023 06:05:13 PM      PATIENT REFERRED TO:  Your healthcare provider    Schedule an appointment as soon as possible for a visit in 3 days  As needed    [facility]  ED  [address]  Go to   If symptoms worsen    DISCHARGE MEDICATIONS:  Discharge Medication List as of 3/27/2023  6:10 PM          DISCONTINUED MEDICATIONS:  Discharge Medication List as of 3/27/2023  6:10 PM                 (Please note that portions of this note were completed with a voice recognition program.  Efforts were made to edit the dictations but occasionally words are mis-transcribed. )    Tala Tian PA-C (electronically signed)        Tala Tian PA-C  03/30/23 [id number]

## 2023-04-04 ENCOUNTER — OFFICE VISIT (OUTPATIENT)
Dept: PULMONOLOGY | Age: 78
End: 2023-04-04
Payer: MEDICARE

## 2023-04-04 DIAGNOSIS — G47.33 OBSTRUCTIVE SLEEP APNEA (ADULT) (PEDIATRIC): Primary | ICD-10-CM

## 2023-04-04 PROCEDURE — 99215 OFFICE O/P EST HI 40 MIN: CPT | Performed by: INTERNAL MEDICINE

## 2023-04-04 PROCEDURE — 1123F ACP DISCUSS/DSCN MKR DOCD: CPT | Performed by: INTERNAL MEDICINE

## 2023-04-04 PROCEDURE — 95976 ALYS SMPL CN NPGT PRGRMG: CPT | Performed by: INTERNAL MEDICINE

## 2023-04-04 ASSESSMENT — ENCOUNTER SYMPTOMS
RESPIRATORY NEGATIVE: 1
GASTROINTESTINAL NEGATIVE: 1
EYES NEGATIVE: 1
ALLERGIC/IMMUNOLOGIC NEGATIVE: 1

## 2023-04-04 NOTE — PATIENT INSTRUCTIONS
Amplitude: 2.0 volts with the default [+ - +] electrode configuration.   Incoming Control Range:  Lower Limit: 1.4 volts  Upper Limit: 2.4 volts         Rate: Freq 33 hz  And 90 pulse width  Amplitude 2.0    Start delay  30 minutes  Pause delay 15 minutes  Duration 8  hours      Time usage: 61 Hours per week      Outgoing Control Range:  Now at level 7 and 2.0 volts  Will get Inspire titration            Return to clinic after inspire titration

## 2023-04-04 NOTE — PROGRESS NOTES
MA Communication:   The following orders are received by verbal communication from Manny Roy MD    Orders include:  INSPIRE PSG       Pt to call once scheduled to schedule fu
4/4/23  Neurostimulator Reprogramming  Approximately 20 minutes was spent analyzing the airway and programming the device. Incoming Amplitude: 2.0 volts with the default [+ - +] electrode configuration. Incoming Control Range:  Lower Limit: 1.4 volts  Upper Limit: 2.4 volts         Rate: Freq 33 hz  And 90 pulse width  Amplitude 2.0    Start delay  30 minutes  Pause delay 15 minutes  Duration 8  hours      Time usage: 61 Hours per week      Outgoing Control Range:  Now at level 7 and 2.0 volts  Will get Inspire titration            Return to clinic after inspire titration          No follow-ups on file. SUBJECTIVE/OBJECTIVE:    Patient was seen on 1/24/2023 because of inspire activation  Initially seen by Dr. Sanjiv Hall        Patient was initially seen July 2022 by Dr. Sanjiv Hall  Has been on CPAP therapy and came from Dr. Yony Wagner office. Patient has been on CPAP for many years about 4-5. And was started about CPAP therapy in [facility]. Since he is moved. He has been looking for an alternative as the masks keeps slipping and is ripping out his hair and causing issues with the scalp. Patient was referred and then finally came in to have his inspire implanted and evaluated by Dr. Sanjiv Hall. Patient returns from implantation without any problems  No chest pains or palpitations  Healing well from the surgery        Review of Systems   Constitutional: Negative. HENT: Negative. Eyes: Negative. Respiratory: Negative. Cardiovascular: Negative. Gastrointestinal: Negative. Endocrine: Negative. Genitourinary: Negative. Musculoskeletal: Negative. Skin: Negative. Allergic/Immunologic: Negative. Neurological: Negative. Hematological: Negative. Psychiatric/Behavioral: Negative. There were no vitals filed for this visit. Physical Exam  Vitals and nursing note reviewed. Constitutional:       General: She is not in acute distress. Appearance: Normal appearance.

## 2023-05-10 ENCOUNTER — HOSPITAL ENCOUNTER (OUTPATIENT)
Dept: SLEEP CENTER | Age: 78
Discharge: HOME | End: 2023-05-10
Payer: MEDICARE

## 2023-05-10 DIAGNOSIS — G47.33 OBSTRUCTIVE SLEEP APNEA (ADULT) (PEDIATRIC): ICD-10-CM

## 2023-05-10 PROCEDURE — 95810 POLYSOM 6/> YRS 4/> PARAM: CPT

## 2023-05-23 ENCOUNTER — OFFICE VISIT (OUTPATIENT)
Dept: PULMONOLOGY | Age: 78
End: 2023-05-23
Payer: MEDICARE

## 2023-05-23 DIAGNOSIS — G47.33 OBSTRUCTIVE SLEEP APNEA (ADULT) (PEDIATRIC): Primary | ICD-10-CM

## 2023-05-23 DIAGNOSIS — G47.00 INSOMNIA, UNSPECIFIED TYPE: ICD-10-CM

## 2023-05-23 PROCEDURE — 1123F ACP DISCUSS/DSCN MKR DOCD: CPT | Performed by: INTERNAL MEDICINE

## 2023-05-23 PROCEDURE — 95976 ALYS SMPL CN NPGT PRGRMG: CPT | Performed by: INTERNAL MEDICINE

## 2023-05-23 PROCEDURE — 99215 OFFICE O/P EST HI 40 MIN: CPT | Performed by: INTERNAL MEDICINE

## 2023-05-23 ASSESSMENT — ENCOUNTER SYMPTOMS
RESPIRATORY NEGATIVE: 1
EYES NEGATIVE: 1
ALLERGIC/IMMUNOLOGIC NEGATIVE: 1
GASTROINTESTINAL NEGATIVE: 1

## 2023-05-23 NOTE — PROGRESS NOTES
MA Communication:   The following orders are received by verbal communication from Yunior Gibson MD    Orders include:  2 mo fu scheduled 8/1/23 @ 2:30 PM
ear normal.      Left Ear: External ear normal.      Nose: Nose normal.      Mouth/Throat:      Mouth: Mucous membranes are moist.      Pharynx: Oropharynx is clear. Comments: Mallampati 3  Inspire implantation site under the chin looks clean and well-healed  Eyes:      General: No scleral icterus. Extraocular Movements: Extraocular movements intact. Conjunctiva/sclera: Conjunctivae normal.      Pupils: Pupils are equal, round, and reactive to light. Cardiovascular:      Rate and Rhythm: Normal rate and regular rhythm. Pulses: Normal pulses. Heart sounds: Normal heart sounds. No murmur heard. No friction rub. Pulmonary:      Effort: No respiratory distress. Breath sounds: No wheezing. Comments: Incision site for inspire on the right chest wall is clean and well-healed  Abdominal:      General: Abdomen is flat. Bowel sounds are normal. There is no distension. Tenderness: There is no abdominal tenderness. There is no guarding. Musculoskeletal:         General: No swelling or tenderness. Normal range of motion. Cervical back: Normal range of motion and neck supple. No rigidity. Skin:     General: Skin is warm and dry. Coloration: Skin is not jaundiced. Neurological:      General: No focal deficit present. Mental Status: She is alert and oriented to person, place, and time. Mental status is at baseline. Cranial Nerves: No cranial nerve deficit. Sensory: No sensory deficit. Motor: No weakness. Gait: Gait normal.   Psychiatric:         Mood and Affect: Mood normal.         Thought Content:  Thought content normal.         Judgment: Judgment normal.            Volodymyr Calle MD

## 2023-05-23 NOTE — PATIENT INSTRUCTIONS
programming the device. Incoming Amplitude: 2.0 volts with the default [+ - +] electrode configuration. Incoming Control Range:  Lower Limit: 1.4 volts  Upper Limit: 2.4 volts         Rate: Freq 33 hz  And 90 pulse width  Amplitude 2.0    Start delay  30 minutes  Pause delay 15 minutes  Duration 8  hours      Time usage: 61 Hours per week      Outgoing Control Range:  Now at level 7 and 2.0 volts  Will get Inspire titration            Inspire titration sleep study done 5/10/2023            Follow-up on 5/23/2023-for inspire  Neurostimulator Reprogramming  Approximately 20 minutes was spent analyzing the airway and programming the device. Incoming Amplitude: 2.0 volts with the default [+ - +] electrode configuration.   Incoming Control Range:  Lower Limit: 1.8 volts  Upper Limit: 2.8 volts         Rate: Freq 33 hz  And 90 pulse width  Amplitude 2.0    Start delay  60 minutes  Pause delay 15 minutes  Duration 8  hours      Time usage: 50 Hours per week      Outgoing Control Range:  Lower Limit: 1.8 volts  Upper Limit: 2.2 volts    Level 3 at 2.0 volts is controlling the sleep apnea      Increase use of inspire, keep adjusting the level up to 1 level every  7 days          Insomnia  Started Nov 2022 and not related to the inspire    Will start with  Will start with :  Melatonin 5 mg at night for 1-2 week then go to  Melatonin 10 mg at night for 1-2 week then go to  Melatonin 15 mg at night for 1-2 weeks then go to   Melatonin 20 mg at night for 1-2 weeks    Take this dose every night at the same time  No TV, smartphones, computer, tablets one hour before bedtime  Take the melatonin about 1 hour prior to bedtime    Lights in the house start down at the 1/2 prior to bedtime      Need to take 1 hour before bedtime  And no TV, computer, smart phone one hour before bedtime  And dim lights in the house 1 hour before bedtime          RTC in 2 months    Remember to bring a list of pulmonary medications and any CPAP or BiPAP

## 2023-06-22 ENCOUNTER — TELEPHONE (OUTPATIENT)
Dept: PULMONOLOGY | Age: 78
End: 2023-06-22

## 2023-06-22 NOTE — TELEPHONE ENCOUNTER
Patient wanted to ask the doctor if she may need a stronger Allergy Medication   She is taking OTC Antihistamine medication   She states she loves the [facility]   She wanted to know if you have had any other patients stating they have to turn the Inspire off and breath out of their mouth due to increased  nasal fullness with allergy season

## 2023-08-29 ENCOUNTER — TELEPHONE (OUTPATIENT)
Dept: PULMONOLOGY | Age: 78
End: 2023-08-29

## 2023-10-31 ENCOUNTER — OFFICE VISIT (OUTPATIENT)
Dept: PULMONOLOGY | Age: 78
End: 2023-10-31
Payer: MEDICARE

## 2023-10-31 DIAGNOSIS — G47.00 INSOMNIA, UNSPECIFIED TYPE: ICD-10-CM

## 2023-10-31 DIAGNOSIS — Z96.82 S/P INSERTION OF HYPOGLOSSAL NERVE STIMULATOR: ICD-10-CM

## 2023-10-31 DIAGNOSIS — G47.33 OBSTRUCTIVE SLEEP APNEA (ADULT) (PEDIATRIC): Primary | ICD-10-CM

## 2023-10-31 PROCEDURE — 95976 ALYS SMPL CN NPGT PRGRMG: CPT | Performed by: INTERNAL MEDICINE

## 2023-10-31 PROCEDURE — 1123F ACP DISCUSS/DSCN MKR DOCD: CPT | Performed by: INTERNAL MEDICINE

## 2023-10-31 PROCEDURE — 99214 OFFICE O/P EST MOD 30 MIN: CPT | Performed by: INTERNAL MEDICINE

## 2023-10-31 ASSESSMENT — ENCOUNTER SYMPTOMS
ALLERGIC/IMMUNOLOGIC NEGATIVE: 1
GASTROINTESTINAL NEGATIVE: 1
RESPIRATORY NEGATIVE: 1
EYES NEGATIVE: 1

## 2023-10-31 NOTE — PROGRESS NOTES
Sonam Pacheco (: 1945 ) is a 70 y.o. female here for an evaluation of No chief complaint on file. ASSESSMENT/PLAN:   Diagnosis Orders   1. Obstructive sleep apnea (adult) (pediatric)        2. S/P insertion of hypoglossal nerve stimulator        3. Insomnia, unspecified type                Sleep study that qualified patient for inspire              Drug-induced sleep endoscopy done 10/10/2022    Date of Procedure: 10/10/22  Time: 1200     Pre Operative Diagnoses: Obstructive Sleep Apnea  Post Operative Diagnoses:  Obstructive Sleep Apnea           Procedure:  1. Drug Induced Sleep endoscopy (15189)       Surgeon: Irene Sosa DO    In the hypopharynx, a very large, tongue base is observed in complete anterior-posterior retrolingual/retroepiglottic obstruction. In summary, there is no evidence of complete concentric palatal obstruction and does appear to be a candidate anatomically for hypoglossal nerve stimulation therapy. Inspire implantation done  Date of Operation:   22     Pre-operative Diagnosis:   Obstructive sleep apnea [G47.33]      Post-operative Diagnosis:   * No post-op diagnosis entered *      Operative Procedure:   Procedure(s):  INSPIRE DEVICE PLACEMENT (N/A)         Attending Surgeon:   Irene Sosa DO        Initial visit for activation 2023  Neurostimulator Reprogramming  Approximately 20 minutes was spent analyzing the airway and programming the device. Sensing voltage:  1.2 volts  Starting amplitude: 1.4 volts with the default [+ - +] electrode configuration. Starting Range:  Lower Limit: 1.4 volts  Upper Limit: 2.4 volts            Rate: Freq 33 hz  And 90 pulse width  Amplitude 1.4    Start delay 30 minutes  Pause delay 15 minutes  Duration 8 hours      Continue to increase the level up by 1 level point every 7 days, adjust to comfort, if it feels uncomfortable drop it back by 1 level.     The patient will be called about 2 weeks from now in regards

## 2023-10-31 NOTE — PATIENT INSTRUCTIONS
Reprogramming  Approximately 20 minutes was spent analyzing the airway and programming the device. Incoming Amplitude: 2.0 volts with the default [+ - +] electrode configuration. Incoming Control Range:  Lower Limit: 1.4 volts  Upper Limit: 2.4 volts         Rate: Freq 33 hz  And 90 pulse width  Amplitude 2.0    Start delay  30 minutes  Pause delay 15 minutes  Duration 8  hours      Time usage: 61 Hours per week      Outgoing Control Range:  Now at level 7 and 2.0 volts  Will get Inspire titration            Inspire titration sleep study done 5/10/2023            Follow-up on 5/23/2023-for inspire  Neurostimulator Reprogramming  Approximately 20 minutes was spent analyzing the airway and programming the device. Incoming Amplitude: 2.0 volts with the default [+ - +] electrode configuration. Incoming Control Range:  Lower Limit: 1.8 volts  Upper Limit: 2.8 volts         Rate: Freq 33 hz  And 90 pulse width  Amplitude 2.0    Start delay  60 minutes  Pause delay 15 minutes  Duration 8  hours      Time usage: 50 Hours per week      Outgoing Control Range:  Lower Limit: 1.8 volts  Upper Limit: 2.2 volts    Level 3 at 2.0 volts is controlling the sleep apnea      Increase use of inspire, keep adjusting the level up to 1 level every  7 days    Follow-up on 10/31/2023  Neurostimulator Reprogramming  Approximately 20 minutes was spent analyzing the airway and programming the device. Incoming Amplitude: 2.0 volts with the default [+ - +] electrode configuration. Incoming Control Range:  Lower Limit: 1.8 volts  Upper Limit: 2.2  volts         Rate: Freq 33 hz  And 90 pulse width  Amplitude 2.0    Start delay  60 minutes  Pause delay 15 minutes  Duration 8  hours      Time usage: 19 Hours per week      Outgoing Control Range:   Will need further increase the use of inspire  Will continue with increasing the voltage  Trying to get sleep sync      Increase use of inspire, keep adjusting the level up to 1 level every  7

## 2024-01-23 ENCOUNTER — OFFICE VISIT (OUTPATIENT)
Dept: PULMONOLOGY | Age: 79
End: 2024-01-23
Payer: MEDICARE

## 2024-01-23 DIAGNOSIS — G47.00 INSOMNIA, UNSPECIFIED TYPE: ICD-10-CM

## 2024-01-23 DIAGNOSIS — G47.33 OBSTRUCTIVE SLEEP APNEA (ADULT) (PEDIATRIC): Primary | ICD-10-CM

## 2024-01-23 DIAGNOSIS — Z96.82 S/P INSERTION OF HYPOGLOSSAL NERVE STIMULATOR: ICD-10-CM

## 2024-01-23 PROCEDURE — 1123F ACP DISCUSS/DSCN MKR DOCD: CPT | Performed by: INTERNAL MEDICINE

## 2024-01-23 PROCEDURE — 99214 OFFICE O/P EST MOD 30 MIN: CPT | Performed by: INTERNAL MEDICINE

## 2024-01-23 PROCEDURE — 95976 ALYS SMPL CN NPGT PRGRMG: CPT | Performed by: INTERNAL MEDICINE

## 2024-01-23 ASSESSMENT — ENCOUNTER SYMPTOMS
GASTROINTESTINAL NEGATIVE: 1
EYES NEGATIVE: 1
RESPIRATORY NEGATIVE: 1

## 2024-01-23 NOTE — PROGRESS NOTES
inspire  Will continue with increasing the voltage  Trying to get sleep sync      Increase use of inspire, keep adjusting the level up to 1 level every  7 days      Follow-up on 1/23/2024  Neurostimulator Reprogramming  Approximately 20 minutes was spent analyzing the airway and programming the device.  Incoming Amplitude: 2.0 volts with the default [+ - +] electrode configuration.  Incoming Control Range:  Lower Limit: 1.8 volts  Upper Limit: 2.2 volts         Rate: Freq 33 hz  And 90 pulse width  Amplitude 2.0, level 3    Start delay  60 minutes  Pause delay 15 minutes  Duration 8  hours      Time usage: 45 Hours per week      Outgoing Control Range:  Will continue with the range that she is at from 0.8 to 2.2 V  Will continue with the voltage at level 3, and 2.0 V  No titration needed at this time  Subjectively and objectively the patient is doing better  Overall we will continue with the current regiment  Continue to use the inspire every night          Insomnia  Started Nov 2022 and not related to the inspire    continue  Melatonin 5-20 mg at night, as needed      Take this dose every night at the same time  No TV, smartphones, computer, tablets one hour before bedtime  Take the melatonin about 1 hour prior to bedtime    Lights in the house start down at the 1/2 prior to bedtime      Need to take 1 hour before bedtime  And no TV, computer, smart phone one hour before bedtime  And dim lights in the house 1 hour before bedtime          RTC in 6 months    No follow-ups on file.       I have personally reviewed and summarized the old records a      I have reviewed the lab tests, radiology/sleep reports and medications    I have downloaded and interpreted the inspire data. I have made adjustments as described    Reviewed present meds and side effects. Continue present meds.  Stay compliant. Call if worsens.      The patient reports benefit from the above therapies, will continue with current therapeutic

## 2024-01-23 NOTE — PATIENT INSTRUCTIONS
ASSESSMENT/PLAN:   Diagnosis Orders   1. Obstructive sleep apnea (adult) (pediatric)        2. S/P insertion of hypoglossal nerve stimulator        3. Insomnia, unspecified type                Sleep study that qualified patient for inspire              Drug-induced sleep endoscopy done 10/10/2022    Date of Procedure: 10/10/22  Time: 1200     Pre Operative Diagnoses: Obstructive Sleep Apnea  Post Operative Diagnoses:  Obstructive Sleep Apnea           Procedure:  1. Drug Induced Sleep endoscopy (16581)       Surgeon: Jenny Palumbo DO    In the hypopharynx, a very large, tongue base is observed in complete anterior-posterior retrolingual/retroepiglottic obstruction.  In summary, there is no evidence of complete concentric palatal obstruction and does appear to be a candidate anatomically for hypoglossal nerve stimulation therapy.             Inspire implantation done  Date of Operation:   12/12/22     Pre-operative Diagnosis:   Obstructive sleep apnea [G47.33]      Post-operative Diagnosis:   * No post-op diagnosis entered *      Operative Procedure:   Procedure(s):  INSPIRE DEVICE PLACEMENT (N/A)         Attending Surgeon:   Jenny Palumbo DO        Initial visit for activation 1/24/2023  Neurostimulator Reprogramming  Approximately 20 minutes was spent analyzing the airway and programming the device.  Sensing voltage:  1.2 volts  Starting amplitude: 1.4 volts with the default [+ - +] electrode configuration.  Starting Range:  Lower Limit: 1.4 volts  Upper Limit: 2.4 volts            Rate: Freq 33 hz  And 90 pulse width  Amplitude 1.4    Start delay 30 minutes  Pause delay 15 minutes  Duration 8 hours      Continue to increase the level up by 1 level point every 7 days, adjust to comfort, if it feels uncomfortable drop it back by 1 level.    The patient will be called about 2 weeks from now in regards to how the inspire is working and if there is any questions.      F/u on 4/4/23  Neurostimulator

## 2024-06-02 ENCOUNTER — HOSPITAL ENCOUNTER (INPATIENT)
Age: 79
LOS: 6 days | Discharge: HOME | End: 2024-06-08
Attending: EMERGENCY MEDICINE | Admitting: PSYCHIATRY & NEUROLOGY
Payer: MEDICARE

## 2024-06-02 DIAGNOSIS — R45.851 SUICIDAL IDEATION: Primary | ICD-10-CM

## 2024-06-02 PROBLEM — F32.A DEPRESSION, UNSPECIFIED: Status: ACTIVE | Noted: 2024-06-02

## 2024-06-02 LAB
ALBUMIN SERPL-MCNC: 3.7 G/DL (ref 3.4–5)
ALBUMIN/GLOB SERPL: 1.6 {RATIO} (ref 1.1–2.2)
ALP SERPL-CCNC: 89 U/L (ref 40–129)
ALT SERPL-CCNC: 14 U/L (ref 10–40)
AMORPH SED URNS QL MICRO: ABNORMAL /HPF
AMPHETAMINES UR QL SCN>1000 NG/ML: POSITIVE
ANION GAP SERPL CALCULATED.3IONS-SCNC: 11 MMOL/L (ref 3–16)
APAP SERPL-MCNC: <5 UG/ML (ref 10–30)
AST SERPL-CCNC: 15 U/L (ref 15–37)
BARBITURATES UR QL SCN>200 NG/ML: ABNORMAL
BASOPHILS # BLD: 0 K/UL (ref 0–0.2)
BASOPHILS NFR BLD: 0.6 %
BENZODIAZ UR QL SCN>200 NG/ML: ABNORMAL
BILIRUB SERPL-MCNC: 0.3 MG/DL (ref 0–1)
BILIRUB UR QL STRIP.AUTO: NEGATIVE
BUN SERPL-MCNC: 38 MG/DL (ref 7–20)
CALCIUM SERPL-MCNC: 9 MG/DL (ref 8.3–10.6)
CANNABINOIDS UR QL SCN>50 NG/ML: ABNORMAL
CHLORIDE SERPL-SCNC: 97 MMOL/L (ref 99–110)
CLARITY UR: CLEAR
CO2 SERPL-SCNC: 24 MMOL/L (ref 21–32)
COCAINE UR QL SCN: ABNORMAL
COLOR UR: YELLOW
CREAT SERPL-MCNC: 0.8 MG/DL (ref 0.6–1.2)
DEPRECATED RDW RBC AUTO: 16 % (ref 12.4–15.4)
DRUG SCREEN COMMENT UR-IMP: ABNORMAL
EOSINOPHIL # BLD: 0.1 K/UL (ref 0–0.6)
EOSINOPHIL NFR BLD: 2.1 %
EPI CELLS #/AREA URNS HPF: ABNORMAL /HPF (ref 0–5)
ETHANOLAMINE SERPL-MCNC: NORMAL MG/DL (ref 0–0.08)
FENTANYL SCREEN, URINE: ABNORMAL
GFR SERPLBLD CREATININE-BSD FMLA CKD-EPI: 76 ML/MIN/{1.73_M2}
GLUCOSE SERPL-MCNC: 98 MG/DL (ref 70–99)
GLUCOSE UR STRIP.AUTO-MCNC: NEGATIVE MG/DL
HCT VFR BLD AUTO: 31.3 % (ref 36–48)
HGB BLD-MCNC: 10.5 G/DL (ref 12–16)
HGB UR QL STRIP.AUTO: NEGATIVE
KETONES UR STRIP.AUTO-MCNC: NEGATIVE MG/DL
LEUKOCYTE ESTERASE UR QL STRIP.AUTO: ABNORMAL
LYMPHOCYTES # BLD: 1.6 K/UL (ref 1–5.1)
LYMPHOCYTES NFR BLD: 25 %
MCH RBC QN AUTO: 30.2 PG (ref 26–34)
MCHC RBC AUTO-ENTMCNC: 33.7 G/DL (ref 31–36)
MCV RBC AUTO: 89.6 FL (ref 80–100)
METHADONE UR QL SCN>300 NG/ML: ABNORMAL
MONOCYTES # BLD: 0.9 K/UL (ref 0–1.3)
MONOCYTES NFR BLD: 14 %
MUCOUS THREADS #/AREA URNS LPF: ABNORMAL /LPF
NEUTROPHILS # BLD: 3.7 K/UL (ref 1.7–7.7)
NEUTROPHILS NFR BLD: 58.3 %
NITRITE UR QL STRIP.AUTO: NEGATIVE
OPIATES UR QL SCN>300 NG/ML: ABNORMAL
OXYCODONE UR QL SCN: ABNORMAL
PCP UR QL SCN>25 NG/ML: ABNORMAL
PH UR STRIP.AUTO: 6 [PH] (ref 5–8)
PH UR STRIP: 6 [PH]
PLATELET # BLD AUTO: 264 K/UL (ref 135–450)
PMV BLD AUTO: 8.1 FL (ref 5–10.5)
POTASSIUM SERPL-SCNC: 3.6 MMOL/L (ref 3.5–5.1)
PROT SERPL-MCNC: 6 G/DL (ref 6.4–8.2)
PROT UR STRIP.AUTO-MCNC: NEGATIVE MG/DL
RBC # BLD AUTO: 3.49 M/UL (ref 4–5.2)
RBC #/AREA URNS HPF: ABNORMAL /HPF (ref 0–4)
RENAL EPI CELLS #/AREA UR COMP ASSIST: ABNORMAL /HPF (ref 0–1)
SALICYLATES SERPL-MCNC: 2.5 MG/DL (ref 15–30)
SARS-COV-2 RDRP RESP QL NAA+PROBE: NOT DETECTED
SODIUM SERPL-SCNC: 132 MMOL/L (ref 136–145)
SP GR UR STRIP.AUTO: 1.02 (ref 1–1.03)
UA COMPLETE W REFLEX CULTURE PNL UR: YES
UA DIPSTICK W REFLEX MICRO PNL UR: YES
URN SPEC COLLECT METH UR: ABNORMAL
UROBILINOGEN UR STRIP-ACNC: 0.2 E.U./DL
WBC # BLD AUTO: 6.4 K/UL (ref 4–11)
WBC #/AREA URNS HPF: ABNORMAL /HPF (ref 0–5)

## 2024-06-02 PROCEDURE — 85025 COMPLETE CBC W/AUTO DIFF WBC: CPT

## 2024-06-02 PROCEDURE — 80143 DRUG ASSAY ACETAMINOPHEN: CPT

## 2024-06-02 PROCEDURE — 90791 PSYCH DIAGNOSTIC EVALUATION: CPT | Performed by: SOCIAL WORKER

## 2024-06-02 PROCEDURE — 1240000000 HC EMOTIONAL WELLNESS R&B

## 2024-06-02 PROCEDURE — 80053 COMPREHEN METABOLIC PANEL: CPT

## 2024-06-02 PROCEDURE — 6370000000 HC RX 637 (ALT 250 FOR IP): Performed by: PSYCHIATRY & NEUROLOGY

## 2024-06-02 PROCEDURE — 99285 EMERGENCY DEPT VISIT HI MDM: CPT

## 2024-06-02 PROCEDURE — 36415 COLL VENOUS BLD VENIPUNCTURE: CPT

## 2024-06-02 PROCEDURE — 87086 URINE CULTURE/COLONY COUNT: CPT

## 2024-06-02 PROCEDURE — 82077 ASSAY SPEC XCP UR&BREATH IA: CPT

## 2024-06-02 PROCEDURE — 80179 DRUG ASSAY SALICYLATE: CPT

## 2024-06-02 PROCEDURE — 87077 CULTURE AEROBIC IDENTIFY: CPT

## 2024-06-02 PROCEDURE — 80307 DRUG TEST PRSMV CHEM ANLYZR: CPT

## 2024-06-02 PROCEDURE — 87635 SARS-COV-2 COVID-19 AMP PRB: CPT

## 2024-06-02 PROCEDURE — 81001 URINALYSIS AUTO W/SCOPE: CPT

## 2024-06-02 RX ORDER — ACETAMINOPHEN 325 MG/1
650 TABLET ORAL EVERY 6 HOURS PRN
Status: DISCONTINUED | OUTPATIENT
Start: 2024-06-02 | End: 2024-06-08 | Stop reason: HOSPADM

## 2024-06-02 RX ORDER — HYDROXYZINE HYDROCHLORIDE 25 MG/1
25 TABLET, FILM COATED ORAL 3 TIMES DAILY PRN
Status: DISCONTINUED | OUTPATIENT
Start: 2024-06-02 | End: 2024-06-08 | Stop reason: HOSPADM

## 2024-06-02 RX ORDER — TRAZODONE HYDROCHLORIDE 50 MG/1
50 TABLET ORAL NIGHTLY PRN
Status: DISCONTINUED | OUTPATIENT
Start: 2024-06-02 | End: 2024-06-08 | Stop reason: HOSPADM

## 2024-06-02 RX ADMIN — ACETAMINOPHEN 650 MG: 325 TABLET ORAL at 20:08

## 2024-06-02 ASSESSMENT — PATIENT HEALTH QUESTIONNAIRE - PHQ9
10. IF YOU CHECKED OFF ANY PROBLEMS, HOW DIFFICULT HAVE THESE PROBLEMS MADE IT FOR YOU TO DO YOUR WORK, TAKE CARE OF THINGS AT HOME, OR GET ALONG WITH OTHER PEOPLE: SOMEWHAT DIFFICULT
9. THOUGHTS THAT YOU WOULD BE BETTER OFF DEAD, OR OF HURTING YOURSELF: MORE THAN HALF THE DAYS
6. FEELING BAD ABOUT YOURSELF - OR THAT YOU ARE A FAILURE OR HAVE LET YOURSELF OR YOUR FAMILY DOWN: MORE THAN HALF THE DAYS
SUM OF ALL RESPONSES TO PHQ QUESTIONS 1-9: 16
4. FEELING TIRED OR HAVING LITTLE ENERGY: NEARLY EVERY DAY
1. LITTLE INTEREST OR PLEASURE IN DOING THINGS: NOT AT ALL
2. FEELING DOWN, DEPRESSED OR HOPELESS: NEARLY EVERY DAY
SUM OF ALL RESPONSES TO PHQ QUESTIONS 1-9: 16
SUM OF ALL RESPONSES TO PHQ QUESTIONS 1-9: 16
8. MOVING OR SPEAKING SO SLOWLY THAT OTHER PEOPLE COULD HAVE NOTICED. OR THE OPPOSITE, BEING SO FIGETY OR RESTLESS THAT YOU HAVE BEEN MOVING AROUND A LOT MORE THAN USUAL: NOT AT ALL
7. TROUBLE CONCENTRATING ON THINGS, SUCH AS READING THE NEWSPAPER OR WATCHING TELEVISION: NEARLY EVERY DAY
SUM OF ALL RESPONSES TO PHQ QUESTIONS 1-9: 14
SUM OF ALL RESPONSES TO PHQ9 QUESTIONS 1 & 2: 3
5. POOR APPETITE OR OVEREATING: NOT AT ALL
3. TROUBLE FALLING OR STAYING ASLEEP: NEARLY EVERY DAY

## 2024-06-02 ASSESSMENT — PAIN - FUNCTIONAL ASSESSMENT: PAIN_FUNCTIONAL_ASSESSMENT: NONE - DENIES PAIN

## 2024-06-02 ASSESSMENT — LIFESTYLE VARIABLES
HOW OFTEN DO YOU HAVE A DRINK CONTAINING ALCOHOL: NEVER
HOW OFTEN DO YOU HAVE A DRINK CONTAINING ALCOHOL: NEVER
HOW MANY STANDARD DRINKS CONTAINING ALCOHOL DO YOU HAVE ON A TYPICAL DAY: PATIENT DOES NOT DRINK
HOW MANY STANDARD DRINKS CONTAINING ALCOHOL DO YOU HAVE ON A TYPICAL DAY: PATIENT DOES NOT DRINK

## 2024-06-02 ASSESSMENT — PAIN SCALES - GENERAL: PAINLEVEL_OUTOF10: 6

## 2024-06-02 ASSESSMENT — SLEEP AND FATIGUE QUESTIONNAIRES
DO YOU USE A SLEEP AID: NO
AVERAGE NUMBER OF SLEEP HOURS: 7
DO YOU HAVE DIFFICULTY SLEEPING: YES

## 2024-06-02 ASSESSMENT — PAIN DESCRIPTION - LOCATION: LOCATION: HEAD

## 2024-06-02 NOTE — BH NOTE
Patient has been visible for meals, family visit and needs. Denies SI/HI/AVH. Relaxed but isolative to self around peers. Dis well during family visit. No behavioral issues Will continue to monitor.

## 2024-06-02 NOTE — BH NOTE
[facility]  Admission Note     Admission Type:   Admission Type: Involuntary    Reason for admission: Patient was listed as missing by family. Patient reports increased depression r/t memory loss.         Addictive Behavior:   Addictive Behavior  In the Past 3 Months, Have You Felt or Has Someone Told You That You Have a Problem With  : None    Medical Problems:   Past Medical History:   Diagnosis Date    Depression     GERD (gastroesophageal reflux disease)     Sleep apnea        Status EXAM:  Mental Status and Behavioral Exam  Normal: Yes  Level of Assistance: Independent/Self  Facial Expression: Flat, Worried  Affect: Stable  Level of Consciousness: Alert  Frequency of Checks: 4 times per hour, close  Mood:Normal: No  Mood: Depressed, Sad  Motor Activity:Normal: No  Motor Activity: Decreased  Eye Contact: Good  Observed Behavior: Cooperative  Sexual Misconduct History: Past - no  Preception: [place] to person, [place] to time, [place] to place, [place] to situation  Attention:Normal: No  Attention: Distractible, Unable to concentrate  Thought Processes: Blocking  Thought Content:Normal: No  Thought Content: Poverty of content  Depression Symptoms: Appetite change, Impaired concentration  Anxiety Symptoms: No problems reported or observed.  Terri Symptoms: No problems reported or observed.  Hallucinations: None  Delusions: No  Memory:Normal: No  Memory: Poor recent, Poor remote  Insight and Judgment: No  Insight and Judgment: Poor judgment, Poor insight    Tobacco Screening:  Practical Counseling, on admission, paola X, if applicable and completed (first 3 are required if patient doesn't refuse):            ( ) Recognizing danger situations (included triggers and roadblocks)                    ( ) Coping skills (new ways to manage stress,relaxation techniques, changing routine, distraction)                                                           ( ) Basic information about quitting (benefits of  quitting, techniques in how to quit, available resources  ( ) [facility]                                                                                                                   ( ) Patient refused counseling  ( x) Patient has not smoked in the last 30 days    Metabolic Screening:    No results found for: \"LABA1C\"    No results found for: \"CHOL\"  No results found for: \"TRIG\"  No results found for: \"HDL\"  No components found for: \"LDLCAL\"  No components found for: \"LABVLDL\"      Body mass index is 24.96 kg/m².    BP Readings from Last 2 Encounters:   06/02/24 (!) 150/74   03/27/23 139/78       Recliner Assessment:  Patient none on unit    Pt admitted with followings belongings:  Dental Appliances: None  Vision - Corrective Lenses: At home  Hearing Aid: None  Jewelry: At bedside  Body Piercings Removed: No  Clothing: Bathrobe, Pajamas  Other Valuables: At home  The following personal items were collected during admission. Items secured in locker/safe. Items will be returned to patient at discharge.     Patti Smith RN

## 2024-06-02 NOTE — BH NOTE
CSSR-S Assessment completed with patient who then scored Low risk    Provider Dr Holly was notified of low risk/ suicide precaution order from ED score, via Telephone at 11:00 am.      Were suicide precautions ordered: YES, per ED discontinued by Dr Holly @ 11:07    If not ordered, justification as follows: Patient denies suicide ideation at this time and reports that she will notify staff of any increased thoughts.    Completed by: Patti Smith RN.

## 2024-06-02 NOTE — VIRTUAL HEALTH
Alba Abdi  [id number]  1945     Social Work Behavioral Health Crisis Assessment    06/02/24    Chief Complaint: \"From the neck down, my body is healthy. From the neck up, it's shit.\"    HPI: Patient is a 78 y.o. White (non-) female who presents for Psychiatric Evaluation. Patient presented to the ED on 06/02/24 from home    Past Psychiatric History:  Mood Disorder, Dementia, ADHD-Inattentive,Depression, Anxiety  Previous suicide attempts/self-harm: Denies  Inpatient psychiatric hospitalizations: no  Current outpatient psychiatric provider: Denies  Current therapist: States not in therapy - has a new patient appointment next week  Previous psychiatric medication trials: No prior medication trials  Amphetamine-Dextroamphetamine, buPROPion, Donepezil, Fluoxetine, Hydroxyzine,     Family Psychiatric History: Denies    Sleep Hours: 4-7    Sleep concerns: difficulty maintaining sleep    Use of sleep medications:  melatonin    Substance Abuse History:  Tobacco: Denies  Alcohol: Denies - stopped due to memory issues   Marijuana: Denies  Stimulant: Denies  Opiates: Denies  Benzodiazepine: Denies  Other illicit drug usage: Denies  History of substance/alcohol abuse treatment: Denies    Social History:  Education: College degree  Living Situation/Interest: with friends - [place]  Marital/Committed relationship and parenting hx:   Occupation: retired - previous therapist   Legal History/Hx of Violence: Denies  Spiritual History: yes  Psychological trauma, neglect, or abuse: denies hx of trauma/abuse   Access to guns or other weapons: denies having access to firearms/dangerous weapons     Past Medical History:  Active Ambulatory Problems     Diagnosis Date Noted    Obstructive sleep apnea (adult) (pediatric) 10/10/2022     Resolved Ambulatory Problems     Diagnosis Date Noted    No Resolved Ambulatory Problems     Past Medical History:   Diagnosis Date    Depression     GERD (gastroesophageal reflux

## 2024-06-02 NOTE — BH NOTE
4 Eyes Skin Assessment     The patient is being assessed for  Admission    I agree that 2 RN's have performed a thorough Head to Toe Skin Assessment on the patient. ALL assessment sites listed below have been assessed.       Areas assessed for pressure by both nurses: yes  [x]   Head, Face, and Ears   [x]   Shoulders, Back, and Chest  [x]   Arms, Elbows, and Hands   [x]   Coccyx, Sacrum, and Ischum  [x]   Legs, Feet, and Heels  Scattered scratches to bilateral feet and  shoulder                              Skin Assessed Under all Medical Devices by both nurses:  N/a                All Mepilex Borders were peeled back and area peeked at by both nurses:  n/a  Please list where Mepilex Borders are located:  n/a                 Does the Patient have Skin Breakdown related to pressure?  No     (Insert Photo here n/a)         Demarcus Prevention initiated:  No   Wound Care Orders initiated:  No      WOC nurse consulted for Pressure Injury (Stage 3,4, Unstageable, DTI, NWPT, Complex wounds)and New or Established Ostomies:  No        Nurse 1 eSignature: Electronically signed by Patti Smith RN on 6/2/24 at 12:09 PM EDT    **SHARE this note so that the co-signing nurse is able to place an eSignature**    Nurse 2 eSignature: Electronically signed by Parish Anna RN on 6/2/24 at 12:19 PM EDT

## 2024-06-02 NOTE — ED NOTES
Patient changed into blue safety gown. Pt's gown, robe and shoes placed into patient belonging bag, labeled with patient sticker and placed under nurses station.

## 2024-06-02 NOTE — BH NOTE
Home Medication Reconciliation Status          [] COMPLETE       Medication history has been reviewed and obtained from the following source(s):       [] patient/family verbal report             [] patient/family provided written list       [] external pharmacy   [] external facility list         []  Provider notified that home medication reconciliation is complete          [] IN PROGRESS       Medication reconciliation marked in progress at this time due to:       [] patient/family poor historian      [] waiting arrival of family to clarify       [] waiting for accurate list        [x] external pharmacy needs called      * Follow up is needed.          [] UNABLE TO ASSESS       Medication reconciliation is incomplete and unable to assess at this time due to:       [] critical patient condition   [] patient is unresponsive        [] no family available                       [] unknown pharmacy       [] anonymous patient          * Follow up is needed.      [] Pharmacy consult placed for medication  reconciliation assistance   Additional comments: Pharmacy is not hopen Patient poor historian

## 2024-06-02 NOTE — ED PROVIDER NOTES
[facility] ED  EMERGENCY DEPARTMENT ENCOUNTER        Patient Name: Alba Abdi  MRN: [id number]  Birthdate 1945  Date of evaluation: 6/2/2024  Provider: Henrique Crouch MD  PCP: Sylvia Lewis DO  Note Started: 3:02 AM EDT 6/2/24    CHIEF COMPLAINT       Psychiatric Evaluation (Ems report: pt. Has history of alzheimers and dementia, pt. Was reportedly missing for approximately half a day. PD reported to EMS that pt. Had thoughts of self harm. )      HISTORY OF PRESENT ILLNESS: 1 or more Elements     History from : Patient, EMS, and Law Enforcement    Limitations to history : None    Alba Abdi is a 78 y.o. female who presents for evaluation of psychiatric evaluation.  According to police and EMS, patient has history of Alzheimer's dementia.  They were called because the patient's  could not find her for part of the day.  She was found in and there backyard and was crying, tearful, endorsing thoughts of suicide.  Low enforcement has signed a statement of belief.  Patient states that she does not want to die but has been feeling discouraged because of her dementia.  She does not have a specific plan of how she would harm herself.    Nursing Notes were all reviewed and agreed with or any disagreements were addressed in the HPI.    REVIEW OF SYSTEMS :      Review of Systems    Positives and Pertinent negatives as per HPI.     SURGICAL HISTORY     Past Surgical History:   Procedure Laterality Date    LARYNGOSCOPY N/A 10/10/2022    DRUG INDUCED SLEEP ENDOSCOPY performed by Jenny Palumbo DO at [facility] OR    STIMULATOR SURGERY N/A 12/12/2022    INSPIRE DEVICE PLACEMENT performed by Jenny Palumbo DO at [facility] OR    TONSILLECTOMY         CURRENTMEDICATIONS       Previous Medications    ASCORBIC ACID (VITAMIN C) 250 MG TABLET    Take 250 mg by mouth daily    BUPROPION (WELLBUTRIN) 100 MG TABLET    Take 100 mg by mouth daily    BUSPIRONE (BUSPAR) 10 MG TABLET    Take 10 mg by mouth  daily    CALCIUM CARBONATE-VITAMIN D3 600-400 MG-UNIT TABS    Take 1 capsule by mouth daily    CHOLECALCIFEROL (VITAMIN D) 50 MCG (2000 UT) CAPS CAPSULE    Take by mouth daily    FLUOXETINE (PROZAC) 10 MG CAPSULE    Take 1 capsule by mouth daily    ONDANSETRON (ZOFRAN ODT) 4 MG DISINTEGRATING TABLET    Take 1 tablet by mouth every 8 hours as needed for Nausea    PANTOPRAZOLE (PROTONIX) 20 MG TABLET    Take 20 mg by mouth daily as needed    VITAMIN E 1000 UNITS CAPSULE    Take 1 capsule by mouth daily       ALLERGIES     Cefaclor, Ibuprofen, and Sulfa antibiotics    FAMILYHISTORY       Family History   Problem Relation Age of Onset    No Known Problems Mother     Asthma Father         SOCIAL HISTORY       Social History     Tobacco Use    Smoking status: Never    Smokeless tobacco: Never   Vaping Use    Vaping Use: Never used   Substance Use Topics    Alcohol use: Not Currently     Alcohol/week: 3.0 standard drinks of alcohol     Types: 3 Glasses of wine per week    Drug use: Never       SCREENINGS        [place] Coma Scale  Eye Opening: Spontaneous  Best Verbal Response: Confused  Best Motor Response: Obeys commands  Ravindra Coma Scale Score: 14                CIWA Assessment  BP: (!) 196/61  Pulse: 65           PHYSICAL EXAM  1 or more Elements     ED Triage Vitals   BP Temp Temp src Pulse Resp SpO2 Height Weight   -- -- -- -- -- -- -- --     Vitals:    06/02/24 0305   BP: (!) 196/61   Pulse: 65   Resp: 18   Temp: 98.4 °F (36.9 °C)   SpO2: 97%         General: No acute distress. Alert and Oriented. Appears stated age.  HEENT: No difficulty tolerating oral secretions.   Cardiac: Regular rate   Chest: No respiratory distress. No increased work of breathing. No use of accessory muscles for respiration.   Abdomen: Soft, nontender, nondistended, non-peritonitic.   Extremities:No significant lower extremity edema. Lower extremities are symmetric.   Neuro: Moving all extremities. No focal deficits. Speech is clear.

## 2024-06-02 NOTE — PLAN OF CARE
Patient educated to tell staff if she has any increased thoughts of suicidal ideation. Patient denies suicidal ideation at this time.

## 2024-06-03 PROBLEM — F03.90 DEMENTIA (HCC): Status: ACTIVE | Noted: 2024-06-03

## 2024-06-03 PROBLEM — I34.0 MITRAL VALVE INSUFFICIENCY: Status: ACTIVE | Noted: 2024-06-03

## 2024-06-03 PROBLEM — F03.918 DEMENTIA WITH BEHAVIORAL DISTURBANCE (HCC): Status: ACTIVE | Noted: 2024-06-03

## 2024-06-03 PROBLEM — R45.851 SUICIDAL IDEATION: Status: ACTIVE | Noted: 2024-06-03

## 2024-06-03 LAB
ANION GAP SERPL CALCULATED.3IONS-SCNC: 11 MMOL/L (ref 3–16)
BACTERIA UR CULT: ABNORMAL
BACTERIA UR CULT: ABNORMAL
BUN SERPL-MCNC: 16 MG/DL (ref 7–20)
CALCIUM SERPL-MCNC: 9.1 MG/DL (ref 8.3–10.6)
CHLORIDE SERPL-SCNC: 97 MMOL/L (ref 99–110)
CO2 SERPL-SCNC: 28 MMOL/L (ref 21–32)
CREAT SERPL-MCNC: 0.6 MG/DL (ref 0.6–1.2)
GFR SERPLBLD CREATININE-BSD FMLA CKD-EPI: >90 ML/MIN/{1.73_M2}
GLUCOSE SERPL-MCNC: 104 MG/DL (ref 70–99)
ORGANISM: ABNORMAL
POTASSIUM SERPL-SCNC: 3.9 MMOL/L (ref 3.5–5.1)
SODIUM SERPL-SCNC: 136 MMOL/L (ref 136–145)

## 2024-06-03 PROCEDURE — 6370000000 HC RX 637 (ALT 250 FOR IP): Performed by: PSYCHIATRY & NEUROLOGY

## 2024-06-03 PROCEDURE — 99223 1ST HOSP IP/OBS HIGH 75: CPT | Performed by: PSYCHIATRY & NEUROLOGY

## 2024-06-03 PROCEDURE — 80048 BASIC METABOLIC PNL TOTAL CA: CPT

## 2024-06-03 PROCEDURE — 6370000000 HC RX 637 (ALT 250 FOR IP)

## 2024-06-03 PROCEDURE — 82746 ASSAY OF FOLIC ACID SERUM: CPT

## 2024-06-03 PROCEDURE — 83550 IRON BINDING TEST: CPT

## 2024-06-03 PROCEDURE — 1240000000 HC EMOTIONAL WELLNESS R&B

## 2024-06-03 PROCEDURE — 83540 ASSAY OF IRON: CPT

## 2024-06-03 PROCEDURE — 99221 1ST HOSP IP/OBS SF/LOW 40: CPT

## 2024-06-03 PROCEDURE — 36415 COLL VENOUS BLD VENIPUNCTURE: CPT

## 2024-06-03 PROCEDURE — 82607 VITAMIN B-12: CPT

## 2024-06-03 RX ORDER — DEXTROAMPHETAMINE SACCHARATE, AMPHETAMINE ASPARTATE, DEXTROAMPHETAMINE SULFATE AND AMPHETAMINE SULFATE 5; 5; 5; 5 MG/1; MG/1; MG/1; MG/1
20 TABLET ORAL DAILY
Status: ON HOLD | COMMUNITY
End: 2024-06-08 | Stop reason: HOSPADM

## 2024-06-03 RX ORDER — HYDROXYZINE PAMOATE 25 MG/1
25 CAPSULE ORAL NIGHTLY
Status: ON HOLD | COMMUNITY
End: 2024-06-04

## 2024-06-03 RX ORDER — PANTOPRAZOLE SODIUM 40 MG/1
40 TABLET, DELAYED RELEASE ORAL DAILY
Status: ON HOLD | COMMUNITY
End: 2024-06-04

## 2024-06-03 RX ORDER — DONEPEZIL HYDROCHLORIDE 10 MG/1
10 TABLET, FILM COATED ORAL NIGHTLY
COMMUNITY

## 2024-06-03 RX ORDER — ATORVASTATIN CALCIUM 20 MG/1
20 TABLET, FILM COATED ORAL
COMMUNITY

## 2024-06-03 RX ORDER — FLUOXETINE HYDROCHLORIDE 20 MG/1
60 CAPSULE ORAL NIGHTLY
COMMUNITY

## 2024-06-03 RX ORDER — BUPROPION HYDROCHLORIDE 100 MG/1
200 TABLET ORAL DAILY
Status: DISCONTINUED | OUTPATIENT
Start: 2024-06-03 | End: 2024-06-08 | Stop reason: HOSPADM

## 2024-06-03 RX ORDER — BUPROPION HYDROCHLORIDE 100 MG/1
200 TABLET ORAL DAILY
Status: ON HOLD | COMMUNITY
End: 2024-06-04

## 2024-06-03 RX ORDER — AMOXICILLIN AND CLAVULANATE POTASSIUM 875; 125 MG/1; MG/1
1 TABLET, FILM COATED ORAL EVERY 12 HOURS SCHEDULED
Status: DISCONTINUED | OUTPATIENT
Start: 2024-06-03 | End: 2024-06-04

## 2024-06-03 RX ORDER — FLUOXETINE HYDROCHLORIDE 20 MG/1
60 CAPSULE ORAL NIGHTLY
Status: DISCONTINUED | OUTPATIENT
Start: 2024-06-03 | End: 2024-06-08 | Stop reason: HOSPADM

## 2024-06-03 RX ORDER — PANTOPRAZOLE SODIUM 40 MG/1
40 TABLET, DELAYED RELEASE ORAL DAILY
Status: DISCONTINUED | OUTPATIENT
Start: 2024-06-03 | End: 2024-06-08 | Stop reason: HOSPADM

## 2024-06-03 RX ORDER — DONEPEZIL HYDROCHLORIDE 5 MG/1
10 TABLET, FILM COATED ORAL NIGHTLY
Status: DISCONTINUED | OUTPATIENT
Start: 2024-06-03 | End: 2024-06-08 | Stop reason: HOSPADM

## 2024-06-03 RX ORDER — ATORVASTATIN CALCIUM 10 MG/1
20 TABLET, FILM COATED ORAL
Status: DISCONTINUED | OUTPATIENT
Start: 2024-06-03 | End: 2024-06-08 | Stop reason: HOSPADM

## 2024-06-03 RX ORDER — CIPROFLOXACIN 500 MG/1
250 TABLET, FILM COATED ORAL 2 TIMES DAILY
Status: DISCONTINUED | OUTPATIENT
Start: 2024-06-03 | End: 2024-06-03

## 2024-06-03 RX ADMIN — DONEPEZIL HYDROCHLORIDE 10 MG: 5 TABLET, FILM COATED ORAL at 20:46

## 2024-06-03 RX ADMIN — TRAZODONE HYDROCHLORIDE 50 MG: 50 TABLET ORAL at 20:46

## 2024-06-03 RX ADMIN — BUPROPION HYDROCHLORIDE 200 MG: 100 TABLET, FILM COATED ORAL at 15:37

## 2024-06-03 RX ADMIN — ACETAMINOPHEN 650 MG: 325 TABLET ORAL at 20:41

## 2024-06-03 RX ADMIN — PANTOPRAZOLE SODIUM 40 MG: 40 TABLET, DELAYED RELEASE ORAL at 15:38

## 2024-06-03 RX ADMIN — AMOXICILLIN AND CLAVULANATE POTASSIUM 1 TABLET: 875; 125 TABLET, FILM COATED ORAL at 20:41

## 2024-06-03 RX ADMIN — FLUOXETINE HYDROCHLORIDE 60 MG: 20 CAPSULE ORAL at 20:43

## 2024-06-03 RX ADMIN — ATORVASTATIN CALCIUM 20 MG: 10 TABLET, FILM COATED ORAL at 20:46

## 2024-06-03 RX ADMIN — CIPROFLOXACIN 250 MG: 500 TABLET, FILM COATED ORAL at 13:58

## 2024-06-03 ASSESSMENT — PAIN SCALES - GENERAL
PAINLEVEL_OUTOF10: 0
PAINLEVEL_OUTOF10: 0
PAINLEVEL_OUTOF10: 3

## 2024-06-03 ASSESSMENT — PATIENT HEALTH QUESTIONNAIRE - PHQ9
4. FEELING TIRED OR HAVING LITTLE ENERGY: NEARLY EVERY DAY
9. THOUGHTS THAT YOU WOULD BE BETTER OFF DEAD, OR OF HURTING YOURSELF: NOT AT ALL
3. TROUBLE FALLING OR STAYING ASLEEP: MORE THAN HALF THE DAYS
SUM OF ALL RESPONSES TO PHQ9 QUESTIONS 1 & 2: 4
8. MOVING OR SPEAKING SO SLOWLY THAT OTHER PEOPLE COULD HAVE NOTICED. OR THE OPPOSITE, BEING SO FIGETY OR RESTLESS THAT YOU HAVE BEEN MOVING AROUND A LOT MORE THAN USUAL: NOT AT ALL
SUM OF ALL RESPONSES TO PHQ QUESTIONS 1-9: 12
7. TROUBLE CONCENTRATING ON THINGS, SUCH AS READING THE NEWSPAPER OR WATCHING TELEVISION: NOT AT ALL
6. FEELING BAD ABOUT YOURSELF - OR THAT YOU ARE A FAILURE OR HAVE LET YOURSELF OR YOUR FAMILY DOWN: NEARLY EVERY DAY
2. FEELING DOWN, DEPRESSED OR HOPELESS: NEARLY EVERY DAY
SUM OF ALL RESPONSES TO PHQ QUESTIONS 1-9: 12
10. IF YOU CHECKED OFF ANY PROBLEMS, HOW DIFFICULT HAVE THESE PROBLEMS MADE IT FOR YOU TO DO YOUR WORK, TAKE CARE OF THINGS AT HOME, OR GET ALONG WITH OTHER PEOPLE: NOT DIFFICULT AT ALL
1. LITTLE INTEREST OR PLEASURE IN DOING THINGS: SEVERAL DAYS
SUM OF ALL RESPONSES TO PHQ QUESTIONS 1-9: 12
5. POOR APPETITE OR OVEREATING: NOT AT ALL
SUM OF ALL RESPONSES TO PHQ QUESTIONS 1-9: 12

## 2024-06-03 ASSESSMENT — SLEEP AND FATIGUE QUESTIONNAIRES
DO YOU USE A SLEEP AID: NO
AVERAGE NUMBER OF SLEEP HOURS: 7
SLEEP PATTERN: DIFFICULTY FALLING ASLEEP;RESTFUL
DO YOU HAVE DIFFICULTY SLEEPING: YES

## 2024-06-03 ASSESSMENT — PAIN SCALES - WONG BAKER: WONGBAKER_NUMERICALRESPONSE: NO HURT

## 2024-06-03 ASSESSMENT — PAIN DESCRIPTION - LOCATION: LOCATION: HEAD

## 2024-06-03 ASSESSMENT — PAIN - FUNCTIONAL ASSESSMENT: PAIN_FUNCTIONAL_ASSESSMENT: ACTIVITIES ARE NOT PREVENTED

## 2024-06-03 ASSESSMENT — PAIN DESCRIPTION - DESCRIPTORS: DESCRIPTORS: ACHING

## 2024-06-03 NOTE — PLAN OF CARE
Problem: Risk for Elopement  Goal: Patient will not exit the unit/facility without proper excort  Outcome: Progressing  Flowsheets  Taken 6/2/2024 1222 by Patti Smith, RN  Nursing Interventions for Elopement Risk: Assist with personal care needs such as toileting, eating, dressing, as needed to reduce the risk of wandering  Taken 6/2/2024 1054 by Patti Smith, RN  Nursing Interventions for Elopement Risk: Assist with personal care needs such as toileting, eating, dressing, as needed to reduce the risk of wandering     Problem: Self Harm/Suicidality  Goal: Will have no self-injury during hospital stay  Description: INTERVENTIONS:  1.  Ensure constant observer at bedside with Q15M safety checks  2.  Maintain a safe environment  3.  Secure patient belongings  4.  Ensure family/visitors adhere to safety recommendations  5.  Ensure safety tray has been added to patient's diet order  6.  Every shift and PRN: Re-assess suicidal risk via Frequent Screener    6/2/2024 2126 by Maria A Casas RN  Outcome: Progressing     Problem: Depression  Goal: Will be euthymic at discharge  Description: INTERVENTIONS:  1. Administer medication as ordered  2. Provide emotional support via 1:1 interaction with staff  3. Encourage involvement in milieu/groups/activities  4. Monitor for social isolation  Outcome: Progressing     Problem: Sleep Disturbance  Goal: Will exhibit normal sleeping pattern  Description: INTERVENTIONS:  1. Administer medication as ordered  2. Decrease environmental stimuli, including noise, as appropriate  3. Discourage social isolation and naps during the day  Outcome: Progressing     Problem: Involuntary Admit  Goal: Will cooperate with staff recommendations and doctor's orders and will demonstrate appropriate behavior  Description: INTERVENTIONS:  1. Treat underlying conditions and offer medication as ordered  2. Educate regarding involuntary admission procedures and rules  3. Contain

## 2024-06-03 NOTE — PROGRESS NOTES
Behavioral Services  Medicare Certification Upon Admission    I certify that this patient's inpatient psychiatric hospital admission is medically necessary for:    [x] (1) Treatment which could reasonably be expected to improve this patient's condition,       [x] (2) Or for diagnostic study;     AND     [x](2) The inpatient psychiatric services are provided while the individual is under the care of a physician and are included in the individualized plan of care.    Estimated length of stay/service 3 d    Plan for post-hospital care outpt    Electronically signed by SAUNDRA MYERS MD on 6/3/2024 at 2:41 PM

## 2024-06-03 NOTE — H&P
Hospital Medicine History & Physical      PCP: Sylvia Lewis DO    Date of Admission: 6/2/2024    Date of Service: Pt seen/examined on 6/3/2024     Chief Complaint:    Chief Complaint   Patient presents with    Psychiatric Evaluation     Ems report: pt. Has history of alzheimers and dementia, pt. Was reportedly missing for approximately half a day. PD reported to EMS that pt. Had thoughts of self harm.          History Of Present Illness:      The patient is a 78 y.o. female with pmhx Alzheimer's dementia, depression, GERD, sleep apnea who presented to [facility] for psychiatric eval.  Patient was seen and evaluated in the ED by the ED medical provider, patient was medically cleared for admission to [facility].  This note serves as an admission medical H&P.    Tobacco use: Never  ETOH use: 3 drinks / week  Illicit drug use: Never    Patient denies any medical complaints     Past Medical History:        Diagnosis Date    Depression     GERD (gastroesophageal reflux disease)     Sleep apnea        Past Surgical History:        Procedure Laterality Date    LARYNGOSCOPY N/A 10/10/2022    DRUG INDUCED SLEEP ENDOSCOPY performed by Jenny Palumbo DO at [facility] OR    STIMULATOR SURGERY N/A 12/12/2022    INSPIRE DEVICE PLACEMENT performed by Jenny Palumbo DO at [facility] OR    TONSILLECTOMY         Medications Prior to Admission:    Prior to Admission medications    Medication Sig Start Date End Date Taking? Authorizing Provider   busPIRone (BUSPAR) 10 MG tablet Take 10 mg by mouth daily    Kanu Baez MD   pantoprazole (PROTONIX) 20 MG tablet Take 20 mg by mouth daily as needed    Kanu Baez MD   Ascorbic Acid (VITAMIN C) 250 MG tablet Take 250 mg by mouth daily    Kanu Baez MD   Cholecalciferol (VITAMIN D) 50 MCG (2000 UT) CAPS capsule Take by mouth daily    Kanu Baez MD   ondansetron (ZOFRAN ODT) 4 MG disintegrating tablet Take 1 tablet by mouth every 8 hours as needed  for Nausea 3/7/22   Stephanie Thomas APRN - CNP   buPROPion (WELLBUTRIN) 100 MG tablet Take 100 mg by mouth daily    ProviderKanu MD   calcium carbonate-vitamin D3 600-400 MG-UNIT TABS Take 1 capsule by mouth daily    Kanu Baez MD   vitamin E 1000 units capsule Take 1 capsule by mouth daily    Kanu Baez MD   FLUoxetine (PROZAC) 10 MG capsule Take 1 capsule by mouth daily    ProviderKanu MD       Allergies:  Cefaclor, Ibuprofen, and Sulfa antibiotics    Social History:      TOBACCO:   reports that she has never smoked. She has never used smokeless tobacco.  ETOH:   reports that she does not currently use alcohol after a past usage of about 3.0 standard drinks of alcohol per week.      Family History:   Positive as follows:        Problem Relation Age of Onset    No Known Problems Mother     Asthma Father        REVIEW OF SYSTEMS:       Constitutional: Negative for fever   HENT: Negative for sore throat   Eyes: Negative for redness   Respiratory: Negative  for dyspnea, cough   Cardiovascular: Negative for chest pain   Gastrointestinal: Negative for vomiting, diarrhea   Genitourinary: Negative for hematuria   Musculoskeletal: Negative for arthralgias   Skin: Negative for rash   Neurological: Negative for syncope    Hematological: Negative for easy bruising/bleeding   Psychiatric/Behavorial: Per psychiatry team evaluation     PHYSICAL EXAM:    /88   Pulse 66   Temp 97.7 °F (36.5 °C) (Oral)   Resp 16   Ht 1.651 m (5' 5\")   Wt 68 kg (150 lb)   SpO2 100%   BMI 24.96 kg/m²     Gen: No distress. Alert. Pleasant elderly female.   Eyes: PERRL. No sclera icterus. No conjunctival injection.   Neck: No JVD.  No Carotid Bruit. Trachea midline.  Resp: No accessory muscle use. No crackles. No wheezes. No rhonchi.   CV: Regular rate. Regular rhythm. ++ murmur.  No rub. No edema.   GI: Non-tender. Non-distended. Normal bowel sounds.   Skin: Warm and dry. No nodule on exposed

## 2024-06-03 NOTE — BH NOTE
Call placed to [facility] and spoke with Katia. Medication orders verified and clarified. Dr. Holly called and aware of medications verified and completed.

## 2024-06-03 NOTE — H&P
INITIAL PSYCHIATRIC HISTORY AND PHYSICAL      Patient name: Alba Abdi  Admit date: 6/2/2024  Today's date: 6/3/2024           CC:  dementia      HPI:   Patient seen in room on Adult Behavioral Unit.   Patient is a 78 y.o. female who presented to the ED at [facility]. She presented with memory loss and depressed mood.   She is struggling to recall why she is here. Disoriented to place, date, age. She read from her journal but most information was nonspecific as she was trying to cover for her loss of memory. She state that she has Alzheimer's .     Per [facility] eval   Pt has been experiencing a decline in her cognitive status for a few years but recently diagnosed with alzheimer's by neuro psych with mild dementia and the decline is becoming more prominent and challenging for her day to day functioning. Her PCP prescribes her mood and physical health medications.      Pt states her most concerning trigger for her mood and SI is not beng able to socialize \"because I can't remember things.\" She describes being in social settings and not being able to contribute to the discussions, feeling embarrassed \"feeling stupid because it's going too fast for me.  Last time I just felt lost and confused all day. It has created a really serious depression in which I think why am I here, there's no point in being here.\" Pt states when she was \"missing\" for half the day, she was sitting outside on the deck.     Spoke with pt's son by phone who is in the ED with patient. He states that pt's mood has been declining more over the past few weeks. He notes she is more irritable and having episodes of anger as a result of her declining cognitive status. He states that she lives with a friend who helps to take care of her but there are a few nights he works overnight. Over the past 4 weeks, they have noted pt has a hard time being alone and she will get scared and confused and call in a panic. He is working on putting things in  mg Oral Nightly PRN Brett Holly MD          amoxicillin-clavulanate  1 tablet Oral 2 times per day    atorvastatin  20 mg Oral QHS    buPROPion  200 mg Oral Daily    donepezil  10 mg Oral Nightly    FLUoxetine  60 mg Oral Nightly    pantoprazole  40 mg Oral Daily      PRN Meds: acetaminophen, hydrOXYzine HCl, traZODone   Estimated length of stay: 5 d  Prognosis:  poor   Criteria for Discharge:    Not suicidal, sleeping well, affect stable, depression improving, eating well, aftercare arranged.     Spent > 75 minutes evaluating and treating patient with more than 50 % of time spent with patient discussing care    ______  PLAN    1.  Admit to [facility]  2.  Consult Internal Medicine to evaluate and treat medical conditions  3.  Adjust psychotropic medications to target symptoms. Continue current meds.   4.  Occupational Therapy, Physical Therapy, Group Psychotherapy as tolerated .   MOCA  5.  Reviewed treatment plan with patient including medication risks, benefits, side effects.  Obtained informed consent for treatment.     Maicol Gallego MD  Physician Psychiatry

## 2024-06-03 NOTE — PROGRESS NOTES
Patient is visible on the unit. Watching TV, socializing with peers, snacking. Pt is very pleasant and friendly with staff. Compliant with vitals. C/O headache rated 6/10 and asked for PRN Tylenol, given and pt took whole with water, no issue. Ambulating independently. States that she is feeling better, more like her old self, and that \"I think this is what I needed, to get help.\" Denies SI/HI/AVH. No RTIS noted. No behavioral issues. Will continue to monitor.

## 2024-06-03 NOTE — CARE COORDINATION
SW met w/Pt at bedside to complete their psychosocial assessment, OQ analyst, and lifetime CSSR-S. The Pt was friendly and cooperative in answering/discussing the assessment questions.       24 1454   Psychiatric History   Psychiatric history treatment   (Pt has no Hx of admissions. has a PCP Sylvia Lewis, DO)   Are there any medication issues? No   Recent Psychological Experiences Turmoil (comment)  (Pt reports being admitted due to feeling depressed and scared. Pt reports trouble remembering events but uses a journal to keep track of things. Pt reports trouble w/negative thinking and letting it get the best of her.)   Support System   Support system Adequate   Types of Support System Other (Comment);Friend  (Pt has support from her son.)   Problems in support system Isolated;Lack of access/ transportation   Current Living Situation   Home Living Adequate   Living information Lives with others  (Pt lives in [place] w/her friend/caregiver Zeb)   Problems with living situation  No   Lack of basic needs No   SSDI/SSI SSI   Other government assistance None   Problems with environment None   Current abuse issues Denies   Supervised setting None   Relationship problems No  (Pt's  past away several years ago)   Medical and Self-Care Issues   Relevant medical problems Denies   Relevant self-care issues Denies   Barriers to treatment No   Family Constellation   Spouse/partner-name/age    Children-names/ages 3 sons   Parents    Siblings 3 siblings   Support services   (None)   Childhood   Raised by Biological mother;Biological father   Biological mother    Biological father    Relevant family history Grandfather  by suicide   History of abuse No   Legal History   Legal history No   Juvenile legal history No   Abuse Assessment   Physical abuse Denies   Verbal abuse Denies   Emotional abuse Denies   Financial abuse Denies   Sexual abuse Denies   Possible abuse reported to

## 2024-06-03 NOTE — PLAN OF CARE
Problem: Risk for Elopement  Goal: Patient will not exit the unit/facility without proper excort  6/3/2024 0904 by Stephanie Rogers RN  Outcome: Progressing  Flowsheets (Taken 6/2/2024 1222 by Patti Smith, RN)  Nursing Interventions for Elopement Risk: Assist with personal care needs such as toileting, eating, dressing, as needed to reduce the risk of wandering  Note: Patient has made no attempts to check exit doors.  6/2/2024 2126 by Maria A Casas RN  Outcome: Progressing  Flowsheets  Taken 6/2/2024 1222 by Patti Smith, RN  Nursing Interventions for Elopement Risk: Assist with personal care needs such as toileting, eating, dressing, as needed to reduce the risk of wandering  Taken 6/2/2024 1054 by Patti Smith, RN  Nursing Interventions for Elopement Risk: Assist with personal care needs such as toileting, eating, dressing, as needed to reduce the risk of wandering     Problem: Self Harm/Suicidality  Goal: Will have no self-injury during hospital stay  Description: INTERVENTIONS:  1.  Ensure constant observer at bedside with Q15M safety checks  2.  Maintain a safe environment  3.  Secure patient belongings  4.  Ensure family/visitors adhere to safety recommendations  5.  Ensure safety tray has been added to patient's diet order  6.  Every shift and PRN: Re-assess suicidal risk via Frequent Screener    6/3/2024 0904 by Stephanie Rogers RN  Outcome: Progressing  Flowsheets (Taken 6/3/2024 0904)  Will have no self-injury during hospital stay:   Maintain a safe environment   Secure patient belongings   Ensure safety tray has been added to patient's diet order   Every shift and PRN: Re-assess suicidal risk via Frequent Screener  Note: Alert and pleasant. Denies any SI  6/2/2024 2126 by Maria A Casas RN  Outcome: Progressing     Problem: Depression  Goal: Will be euthymic at discharge  Description: INTERVENTIONS:  1. Administer medication as ordered  2. Provide emotional

## 2024-06-03 NOTE — BH NOTE
Patient pleasant and friendly. Has been out for meals and socializing with female peers. Alert to self and time. Resting in bed off and on. Arouses easily. Started on antibiotics with no adverse reactions noted. Cooperative with care and staff. Appetite good with fluids taken well.Denies pain. No attempts to exit unit. Patient independent with ADLs.

## 2024-06-03 NOTE — BH NOTE
[facility]  Treatment Team Note  Day 1  Review Date & Time: 6/3/2024   0900    Patient was not in treatment team      Status EXAM:   Mental Status and Behavioral Exam  Normal: Yes  Level of Assistance: Independent/Self  Facial Expression: Sad, Brightened  Affect: Congruent  Level of Consciousness: Alert  Frequency of Checks: 4 times per hour, close  Mood:Normal: No  Mood: Anxious, Depressed, Sad  Motor Activity:Normal: Yes  Motor Activity: Decreased  Eye Contact: Good  Observed Behavior: Friendly, Cooperative  Sexual Misconduct History: Current - no  Preception: Waldo to person, Waldo to time, Waldo to situation  Attention:Normal: No  Attention: Distractible  Thought Processes: Circumstantial  Thought Content:Normal: Yes  Thought Content: Poverty of content, Preoccupations  Depression Symptoms: Feelings of helplessness, Feelings of hopelessess, Feelings of worthlessness, Impaired concentration, Sleep disturbance, Crying  Anxiety Symptoms: Feelings of doom, Generalized  Terri Symptoms: Less need to sleep  Hallucinations: None  Delusions: No  Memory:Normal: No  Memory: Poor remote, Poor recent  Insight and Judgment: No  Insight and Judgment: Poor judgment      Suicide Risk CSSR-S:  1) Within the past month, have you wished you were dead or wished you could go to sleep and not wake up? : No  2) Have you actually had any thoughts of killing yourself? : No  6) Have you ever done anything, started to do anything, or prepared to do anything to end your life?: No      PLAN/TREATMENT RECOMMENDATIONS UPDATE:   Patient will take medication as prescribed, eat 75% of meals, attend groups, participate in milieu activities, participate in treatment team and care planning for discharge and follow up.            Parish Anna RN

## 2024-06-04 LAB
FOLATE SERPL-MCNC: 17.55 NG/ML (ref 4.78–24.2)
IRON SATN MFR SERPL: 12 % (ref 15–50)
IRON SERPL-MCNC: 52 UG/DL (ref 37–145)
TIBC SERPL-MCNC: 437 UG/DL (ref 260–445)
VIT B12 SERPL-MCNC: 555 PG/ML (ref 211–911)

## 2024-06-04 PROCEDURE — 6370000000 HC RX 637 (ALT 250 FOR IP): Performed by: PSYCHIATRY & NEUROLOGY

## 2024-06-04 PROCEDURE — 99233 SBSQ HOSP IP/OBS HIGH 50: CPT | Performed by: PSYCHIATRY & NEUROLOGY

## 2024-06-04 PROCEDURE — 6370000000 HC RX 637 (ALT 250 FOR IP)

## 2024-06-04 PROCEDURE — 1240000000 HC EMOTIONAL WELLNESS R&B

## 2024-06-04 RX ORDER — HYDROXYZINE HYDROCHLORIDE 25 MG/1
25 TABLET, FILM COATED ORAL NIGHTLY PRN
COMMUNITY

## 2024-06-04 RX ORDER — BUPROPION HYDROCHLORIDE 200 MG/1
200 TABLET, EXTENDED RELEASE ORAL DAILY
Status: ON HOLD | COMMUNITY
Start: 2024-05-30 | End: 2024-06-08

## 2024-06-04 RX ADMIN — ATORVASTATIN CALCIUM 20 MG: 10 TABLET, FILM COATED ORAL at 20:09

## 2024-06-04 RX ADMIN — ACETAMINOPHEN 650 MG: 325 TABLET ORAL at 11:17

## 2024-06-04 RX ADMIN — FLUOXETINE HYDROCHLORIDE 60 MG: 20 CAPSULE ORAL at 20:08

## 2024-06-04 RX ADMIN — PANTOPRAZOLE SODIUM 40 MG: 40 TABLET, DELAYED RELEASE ORAL at 11:18

## 2024-06-04 RX ADMIN — DONEPEZIL HYDROCHLORIDE 10 MG: 5 TABLET, FILM COATED ORAL at 20:09

## 2024-06-04 RX ADMIN — BUPROPION HYDROCHLORIDE 200 MG: 100 TABLET, FILM COATED ORAL at 06:19

## 2024-06-04 RX ADMIN — HYDROXYZINE HYDROCHLORIDE 25 MG: 25 TABLET ORAL at 18:42

## 2024-06-04 RX ADMIN — AMOXICILLIN AND CLAVULANATE POTASSIUM 1 TABLET: 875; 125 TABLET, FILM COATED ORAL at 11:18

## 2024-06-04 ASSESSMENT — PAIN DESCRIPTION - ORIENTATION: ORIENTATION: MID

## 2024-06-04 ASSESSMENT — PAIN SCALES - WONG BAKER: WONGBAKER_NUMERICALRESPONSE: NO HURT

## 2024-06-04 ASSESSMENT — PAIN SCALES - GENERAL
PAINLEVEL_OUTOF10: 7
PAINLEVEL_OUTOF10: 0
PAINLEVEL_OUTOF10: 0

## 2024-06-04 ASSESSMENT — PAIN DESCRIPTION - LOCATION: LOCATION: HEAD

## 2024-06-04 NOTE — PROGRESS NOTES
Occupational Therapy  Orders received, chart reviewed. Patient with visitors this pm. MoCA assessment held until tomorrow after discussing with RN. Will reattempt 6/4/24 as patient status and therapy schedules allow.    Michelle Glasgow, OTR/L [id number]

## 2024-06-04 NOTE — GROUP NOTE
Group Therapy Note    Date: 6/4/2024    Group Start Time: 1100  Group End Time: 1140  Group Topic: Psychoeducation    [facility]    Batsheva Callaway LISW        Group Therapy Note    Attendees: 4       Patient's Goal: to learn and discuss coping skills that start with each letter of the alphabet. Pt's asked to apply to themselves.                                                                          Notes:  pt attended group for the full duration. She participated in group discussion and was able to apply to herself.    Status After Intervention:  Improved    Participation Level: Active Listener and Minimal    Participation Quality: Appropriate and Attentive      Speech:  hesitant      Thought Process/Content: Logical      Affective Functioning: Congruent      Mood: anxious      Level of consciousness:  Alert      Response to Learning: Able to verbalize current knowledge/experience      Endings: None Reported    Modes of Intervention: Education, Support, Socialization, Exploration, and Clarifying      Discipline Responsible: /Counselor      Signature:  COLIN Cooper

## 2024-06-04 NOTE — GROUP NOTE
Group Therapy Note    Date: 6/4/2024    Group Start Time: 1000  Group End Time: 1045  Group Topic: Recreational    [facility]    Maria A Wang,         Group Therapy Note    Attendees: 3    Patients were invited to the milieu for a coffee social.  Participants were provided a refreshment of choice and were able to interact with peers.  Therapist posed some questions to the group to help structure conversation.  Target outcomes included establishing rapport, increasing communication skills, improving social connection, reducing stress/anxiety, and improving mood.      Notes:  Pt was present and engaged during session.  Interactive with peers and shared during group discussion.  Met goal for group.    Status After Intervention:  Improved    Participation Level: Active Listener and Interactive    Participation Quality: Appropriate, Attentive, Sharing, and Supportive      Speech:  normal      Thought Process/Content: Logical      Affective Functioning: Congruent      Mood: euthymic      Level of consciousness:  Alert and Attentive      Response to Learning: Capable of insight, Able to change behavior, and Progressing to goal      Endings: None Reported    Modes of Intervention: Support, Socialization, and Activity      Discipline Responsible: Psychoeducational Specialist and Recreational Therapist      Signature:  Maria A Wang MA, CTRS

## 2024-06-04 NOTE — PLAN OF CARE
Problem: Risk for Elopement  Goal: Patient will not exit the unit/facility without proper excort  6/3/2024 2120 by Kasey Valdez LPN  Outcome: Progressing  Flowsheets  Taken 6/3/2024 2108 by Kasey Valdez LPN  Nursing Interventions for Elopement Risk:   Assist with personal care needs such as toileting, eating, dressing, as needed to reduce the risk of wandering   Reduce environmental triggers   Shoes and clothing collected and placed in gown attire  Taken 6/3/2024 0922 by Stephanie Rogers, RN  Nursing Interventions for Elopement Risk:   Assist with personal care needs such as toileting, eating, dressing, as needed to reduce the risk of wandering   Reduce environmental triggers   Shoes and clothing collected and placed in gown attire     Problem: Self Harm/Suicidality  Goal: Will have no self-injury during hospital stay  Description: INTERVENTIONS:  1.  Ensure constant observer at bedside with Q15M safety checks  2.  Maintain a safe environment  3.  Secure patient belongings  4.  Ensure family/visitors adhere to safety recommendations  5.  Ensure safety tray has been added to patient's diet order  6.  Every shift and PRN: Re-assess suicidal risk via Frequent Screener    6/3/2024 2120 by Kasey Valdez LPN  Outcome: Progressing  Flowsheets  Taken 6/3/2024 2108 by Kasey Valdez LPN  Will have no self-injury during hospital stay:   Maintain a safe environment   Secure patient belongings  Taken 6/3/2024 0922 by Stephanie Rogers, RN  Will have no self-injury during hospital stay:   Maintain a safe environment   Secure patient belongings   Every shift and PRN: Re-assess suicidal risk via Frequent Screener     Problem: Depression  Goal: Will be euthymic at discharge  Description: INTERVENTIONS:  1. Administer medication as ordered  2. Provide emotional support via 1:1 interaction with staff  3. Encourage involvement in milieu/groups/activities  4. Monitor for social

## 2024-06-04 NOTE — PROGRESS NOTES
Pt came up to nurse's station this evening reporting that she was anxious regarding a conversation she had with her sons during visiting hours. Pt stated that her sons were telling her instances that have happened recently when the pt has become very angry or crabby with them despite this being very out of character for her. Pt reports that she has always thought of herself as a very happy and positive person, so is worried that she now has another side of her that she does not remember, or does not pay attention to, that is mean and nasty. Writer reassured pt during one on one. Pt given atarax @ 1842 for anxiety. Will monitor for effectiveness.

## 2024-06-04 NOTE — BH NOTE
Pt gave verbal permission for staff to give information to three of her sons including Eusebio Abdi, Dallas Abdi, and Fish Abdi. All three sons are involved with the pt's care at home and will be assisting in discharge planning for the pt.     Eusebio Abdi: [phone]   Dallas Abdi: [phone]  Fish Abdi: [phone]

## 2024-06-04 NOTE — BH NOTE
met with pt for 1:1. Pt pleasant in conversation. She expressed that she likes to write in her journal and that she likes to go for walks. Pt reported that she has memory issues, but tries to stay positive. At times pt struggled with finding words when we were having a conversation.

## 2024-06-04 NOTE — PROGRESS NOTES
Department of Psychiatry  AttendingProgress Note  Chief Complaint: dementia   Alba has been active in program. She remains disoriented and unable to explain reason for being here.   No med changes  She is pleasant and to enagage.     Patient's chart was reviewed and collaborated with  about the treatment plan.  SUBJECTIVE:    Patient is feeling unchanged. Suicidal ideation:  denies suicidal ideation.  Patient does not have medication side effects.    ROS: Patient has new complaints: no  Sleeping adequately:  Yes   Appetite adequate: Yes  Attending groups: Yes  Visitors:Yes    OBJECTIVE    Physical  VITALS:  BP (!) 148/66   Pulse 60   Temp 98.4 °F (36.9 °C) (Oral)   Resp 17   Ht 1.651 m (5' 5\")   Wt 68 kg (150 lb)   SpO2 96%   BMI 24.96 kg/m²     Mental Status Examination:  Patients appearance was hospital attire. Thoughts are Goal directed. Homicidal ideations none.  No abnormal movements, tics or mannerisms.  Memory intact Aims 0. Concentration Fair.   Alert and oriented X 4. Insight and Judgement impaired insight. Patient was cooperative. Patient gait normal. Mood within normal limits, affect normal affect Hallucinations Absent, suicidal ideations no specific plan to harm self Speech normal volume  Data  Labs:   Admission on 06/02/2024   Component Date Value Ref Range Status    WBC 06/02/2024 6.4  4.0 - 11.0 K/uL Final    RBC 06/02/2024 3.49 (L)  4.00 - 5.20 M/uL Final    Hemoglobin 06/02/2024 10.5 (L)  12.0 - 16.0 g/dL Final    Hematocrit 06/02/2024 31.3 (L)  36.0 - 48.0 % Final    MCV 06/02/2024 89.6  80.0 - 100.0 fL Final    MCH 06/02/2024 30.2  26.0 - 34.0 pg Final    MCHC 06/02/2024 33.7  31.0 - 36.0 g/dL Final    RDW 06/02/2024 16.0 (H)  12.4 - 15.4 % Final    Platelets 06/02/2024 264  135 - 450 K/uL Final    MPV 06/02/2024 8.1  5.0 - 10.5 fL Final    Neutrophils % 06/02/2024 58.3  % Final    Lymphocytes % 06/02/2024 25.0  % Final    Monocytes % 06/02/2024 14.0  % Final    Eosinophils  screen  panel  Drug panel-PM-Hi Res Ur, Interp (PAIN) should be considered for drug  monitoring \".      Phencyclidine (PCP), Screen, Urine 06/02/2024 Neg  Negative <25 ng/mL Final    Methadone Screen, Urine 06/02/2024 Neg  Negative <300 ng/mL Final    Oxycodone Urine 06/02/2024 Neg  Negative <100 ng/ml Final    FENTANYL SCREEN, URINE 06/02/2024 Neg  Negative <5 ng/mL Final    pH, Urine 06/02/2024 6.0   Final    Comment: Urine pH less than 5.0 or greater than 8.0 may indicate sample adulteration.  Another sample should be collected if clinically  indicated.      Drug Screen Comment: 06/02/2024 see below   Final    Comment: This method is a screening test to detect only these drug  classes as part of a medical workup.  Confirmatory testing  by another method should be ordered if clinically indicated.      Mucus, UA 06/02/2024 2+ (A)  None Seen /LPF Final    WBC, UA 06/02/2024 10-20 (A)  0 - 5 /HPF Final    RBC, UA 06/02/2024 None seen  0 - 4 /HPF Final    Epithelial Cells, UA 06/02/2024 2-5  0 - 5 /HPF Final    Renal Epithelial, UA 06/02/2024 0-1  0 - 1 /HPF Final    Amorphous, UA 06/02/2024 3+  /HPF Final    Urine Culture, Routine 06/02/2024 <10,000 CFU/ml mixed skin/urogenital giancarlo. No further workup (A)   Final    Organism 06/02/2024 Strep agalactiae (Beta Strep Group B) (A)   Final    Urine Culture, Routine 06/02/2024    Final                    Value:25,000 CFU/ml  Susceptibility testing of penicillin and other beta lactams is  not necessary for beta hemolytic Streptococci since resistant  strains have not been identified. (CLSI M100)      SARS-CoV-2, NAAT 06/02/2024 Not Detected  Not Detected Final    Comment: Rapid NAAT:   Negative results should be treated as presumptive and,  if inconsistent with clinical signs and symptoms or necessary for  patient management, should be tested with an alternative molecular  assay. Negative results do not preclude SARS-CoV-2 infection and  should not be used as the sole

## 2024-06-05 PROCEDURE — 6370000000 HC RX 637 (ALT 250 FOR IP): Performed by: PSYCHIATRY & NEUROLOGY

## 2024-06-05 PROCEDURE — 1240000000 HC EMOTIONAL WELLNESS R&B

## 2024-06-05 PROCEDURE — 97165 OT EVAL LOW COMPLEX 30 MIN: CPT

## 2024-06-05 PROCEDURE — 97530 THERAPEUTIC ACTIVITIES: CPT

## 2024-06-05 PROCEDURE — 99233 SBSQ HOSP IP/OBS HIGH 50: CPT | Performed by: PSYCHIATRY & NEUROLOGY

## 2024-06-05 RX ORDER — MEMANTINE HYDROCHLORIDE 5 MG/1
5 TABLET ORAL DAILY
Status: DISCONTINUED | OUTPATIENT
Start: 2024-06-05 | End: 2024-06-08 | Stop reason: HOSPADM

## 2024-06-05 RX ADMIN — DONEPEZIL HYDROCHLORIDE 10 MG: 5 TABLET, FILM COATED ORAL at 21:21

## 2024-06-05 RX ADMIN — FLUOXETINE HYDROCHLORIDE 60 MG: 20 CAPSULE ORAL at 21:21

## 2024-06-05 RX ADMIN — TRAZODONE HYDROCHLORIDE 50 MG: 50 TABLET ORAL at 01:30

## 2024-06-05 RX ADMIN — TRAZODONE HYDROCHLORIDE 50 MG: 50 TABLET ORAL at 22:25

## 2024-06-05 RX ADMIN — ATORVASTATIN CALCIUM 20 MG: 10 TABLET, FILM COATED ORAL at 21:21

## 2024-06-05 RX ADMIN — PANTOPRAZOLE SODIUM 40 MG: 40 TABLET, DELAYED RELEASE ORAL at 08:23

## 2024-06-05 RX ADMIN — ACETAMINOPHEN 650 MG: 325 TABLET ORAL at 21:21

## 2024-06-05 RX ADMIN — MEMANTINE HYDROCHLORIDE 5 MG: 5 TABLET ORAL at 14:08

## 2024-06-05 RX ADMIN — BUPROPION HYDROCHLORIDE 200 MG: 100 TABLET, FILM COATED ORAL at 08:22

## 2024-06-05 RX ADMIN — ACETAMINOPHEN 650 MG: 325 TABLET ORAL at 15:19

## 2024-06-05 ASSESSMENT — PAIN DESCRIPTION - LOCATION
LOCATION: JAW
LOCATION: HEAD

## 2024-06-05 ASSESSMENT — PAIN SCALES - WONG BAKER: WONGBAKER_NUMERICALRESPONSE: HURTS LITTLE MORE

## 2024-06-05 ASSESSMENT — PAIN DESCRIPTION - DESCRIPTORS
DESCRIPTORS: ACHING
DESCRIPTORS: ACHING

## 2024-06-05 ASSESSMENT — PAIN SCALES - GENERAL
PAINLEVEL_OUTOF10: 0
PAINLEVEL_OUTOF10: 3
PAINLEVEL_OUTOF10: 2
PAINLEVEL_OUTOF10: 5
PAINLEVEL_OUTOF10: 5

## 2024-06-05 ASSESSMENT — PAIN - FUNCTIONAL ASSESSMENT
PAIN_FUNCTIONAL_ASSESSMENT: ACTIVITIES ARE NOT PREVENTED
PAIN_FUNCTIONAL_ASSESSMENT: ACTIVITIES ARE NOT PREVENTED

## 2024-06-05 ASSESSMENT — PAIN DESCRIPTION - ORIENTATION: ORIENTATION: RIGHT

## 2024-06-05 NOTE — PLAN OF CARE
Problem: Risk for Elopement  Goal: Patient will not exit the unit/facility without proper excort  Outcome: Progressing     Problem: Self Harm/Suicidality  Goal: Will have no self-injury during hospital stay  Description: INTERVENTIONS:  1.  Ensure constant observer at bedside with Q15M safety checks  2.  Maintain a safe environment  3.  Secure patient belongings  4.  Ensure family/visitors adhere to safety recommendations  5.  Ensure safety tray has been added to patient's diet order  6.  Every shift and PRN: Re-assess suicidal risk via Frequent Screener    Outcome: Progressing     Problem: Depression  Goal: Will be euthymic at discharge  Description: INTERVENTIONS:  1. Administer medication as ordered  2. Provide emotional support via 1:1 interaction with staff  3. Encourage involvement in milieu/groups/activities  4. Monitor for social isolation  Outcome: Progressing     Problem: Sleep Disturbance  Goal: Will exhibit normal sleeping pattern  Description: INTERVENTIONS:  1. Administer medication as ordered  2. Decrease environmental stimuli, including noise, as appropriate  3. Discourage social isolation and naps during the day  Outcome: Progressing     Problem: Involuntary Admit  Goal: Will cooperate with staff recommendations and doctor's orders and will demonstrate appropriate behavior  Description: INTERVENTIONS:  1. Treat underlying conditions and offer medication as ordered  2. Educate regarding involuntary admission procedures and rules  3. Contain excessive/inappropriate behavior per unit and hospital policies  Outcome: Progressing     Problem: Pain  Goal: Verbalizes/displays adequate comfort level or baseline comfort level  Outcome: Progressing    Patient pleasantly confused. Calm and cooperative. Alert and oriented to self. Intermittently oriented to  time. Patient repeats same questions frequently. Assisted to dial family and a friend. Conversation pleasant. Patient denies SI/HI, AH/VH. No response to  internal stimulus noted. Believes she is on vacation at times and reoriented to situation. Denies pain.  Medication compliant.

## 2024-06-05 NOTE — PROGRESS NOTES
Positive effects of Trazodone noted. Patient rests with eyes closed and calm expression, since 02:30. Respirations regular and even.

## 2024-06-05 NOTE — BH NOTE
[facility]  Treatment Team Note  Day 3    Review Date & Time: 6/5/2024   0900    Patient was not in treatment team      Status EXAM:   Mental Status and Behavioral Exam  Normal: No  Level of Assistance: Independent/Self  Facial Expression: Brightened  Affect: Congruent  Level of Consciousness: Alert  Frequency of Checks: 4 times per hour, close  Mood:Normal: No  Mood: Anxious  Motor Activity:Normal: Yes  Motor Activity: Decreased  Eye Contact: Good  Observed Behavior: Cooperative  Sexual Misconduct History: Current - no  Preception: Sylva to person, Sylva to place  Attention:Normal: No  Attention: Distractible  Thought Processes: Circumstantial  Thought Content:Normal: Yes  Thought Content: Poverty of content, Preoccupations  Depression Symptoms: Impaired concentration  Anxiety Symptoms: Generalized  Terri Symptoms: No problems reported or observed.  Hallucinations: None  Delusions: No  Memory:Normal: No  Memory: Poor recent, Poor remote  Insight and Judgment: No  Insight and Judgment: Poor judgment, Poor insight      Suicide Risk CSSR-S:  1) Within the past month, have you wished you were dead or wished you could go to sleep and not wake up? : No  2) Have you actually had any thoughts of killing yourself? : No  6) Have you ever done anything, started to do anything, or prepared to do anything to end your life?: No      PLAN/TREATMENT RECOMMENDATIONS UPDATE:   Patient will take medication as prescribed, eat 75% of meals, attend groups, participate in milieu activities, participate in treatment team and care planning for discharge and follow up.            Parish Anna RN

## 2024-06-05 NOTE — PROGRESS NOTES
Department of Psychiatry  AttendingProgress Note  Chief Complaint: dementia   Alba is active in program. She is journaling her thoughts daily an dis focused on leaving the hospital. She doesn't believe that she should be here and wants to return home Weight change:  talked about returning  home with in home services . She realizes that her memory is worsening and used the term sundowning with me today .   MOCA today 13/30. Severe cognitive deficits.   Will add Namenda 5 mg QD    Patient's chart was reviewed and collaborated with  about the treatment plan.  SUBJECTIVE:    Patient is feeling better. Suicidal ideation:  denies suicidal ideation.  Patient does not have medication side effects.    ROS: Patient has new complaints: no  Sleeping adequately:  Yes   Appetite adequate: Yes  Attending groups: Yes  Visitors:Yes    OBJECTIVE    Physical  VITALS:  /69   Pulse 60   Temp 97.7 °F (36.5 °C) (Oral)   Resp 16   Ht 1.651 m (5' 5\")   Wt 68 kg (150 lb)   SpO2 100%   BMI 24.96 kg/m²     Mental Status Examination:  Patients appearance was hospital attire. Thoughts are Goal directed. Homicidal ideations none.  No abnormal movements, tics or mannerisms.  Memory impaired Aims 0. Concentration Good.   Alert and oriented X 4. Insight and Judgement impaired insight. Patient was cooperative. Patient gait normal. Mood within normal limits, affect normal affect Hallucinations Absent, suicidal ideations no specific plan to harm self Speech normal volume  Data  Labs:   Admission on 06/02/2024   Component Date Value Ref Range Status    WBC 06/02/2024 6.4  4.0 - 11.0 K/uL Final    RBC 06/02/2024 3.49 (L)  4.00 - 5.20 M/uL Final    Hemoglobin 06/02/2024 10.5 (L)  12.0 - 16.0 g/dL Final    Hematocrit 06/02/2024 31.3 (L)  36.0 - 48.0 % Final    MCV 06/02/2024 89.6  80.0 - 100.0 fL Final    MCH 06/02/2024 30.2  26.0 - 34.0 pg Final    MCHC 06/02/2024 33.7  31.0 - 36.0 g/dL Final    RDW 06/02/2024 16.0 (H)  12.4 -  24.20 ng/mL Final    Comment: Effective 11-15-16 10:00am EST  Please note reference ranges have  changed for Folate.              Medications  Current Facility-Administered Medications: atorvastatin (LIPITOR) tablet 20 mg, 20 mg, Oral, QHS  buPROPion (WELLBUTRIN) tablet 200 mg, 200 mg, Oral, Daily  donepezil (ARICEPT) tablet 10 mg, 10 mg, Oral, Nightly  FLUoxetine (PROZAC) capsule 60 mg, 60 mg, Oral, Nightly  pantoprazole (PROTONIX) tablet 40 mg, 40 mg, Oral, Daily  acetaminophen (TYLENOL) tablet 650 mg, 650 mg, Oral, Q6H PRN  hydrOXYzine HCl (ATARAX) tablet 25 mg, 25 mg, Oral, TID PRN  traZODone (DESYREL) tablet 50 mg, 50 mg, Oral, Nightly PRN    ASSESSMENT AND PLAN    Principal Problem:    Dementia with behavioral disturbance (HCC)  Active Problems:    Depressive disorder    Suicidal ideation    Mitral valve insufficiency  Resolved Problems:    * No resolved hospital problems. *       1.Patient s symptoms   show no change  2.Probable discharge is 2-3 d  3.Discharge planning is complete  4. Suicidal ideation is  none  5. Total time with patient was 50 minutes and more than 50 % of that time was spent counseling the patient on their symptoms, treatment and expected goals.     Maicol Gallego MD  Physician Psychiatry

## 2024-06-05 NOTE — PLAN OF CARE
Problem: Risk for Elopement  Goal: Patient will not exit the unit/facility without proper excort  Outcome: Progressing     Problem: Safety - Adult  Goal: Free from fall injury  Outcome: Progressing    Pt has been visible on the unit. Pt states she did not sleep well last night and felt tired. She denies SI/HI/AVH and no RTIS noted. She has been medication compliant. She is unsure of the date, where she is currently, and why she is here. Denies needs currently

## 2024-06-05 NOTE — PROGRESS NOTES
Inpatient Occupational Therapy Evaluation and Treatment    Unit: [facility]  Date:  6/5/2024  Patient Name:    Alba Abdi  Admitting diagnosis:  Suicidal ideation [R45.851]  Depression, unspecified [F32.A]  Admit Date:  6/2/2024  Precautions/Restrictions/WB Status/ Lines/ Wounds/ Oxygen: Standard BHI Precautions, risk of elopement    Treatment Time:  3806-2702  Treatment Number:  1  Timed Code Treatment Minutes: 15 minutes  Total Treatment Minutes:  25  minutes    Patient Goals for Therapy: none stated          Discharge Recommendations: Home with 24/7 supervision initially-defer to psych team  DME needs for discharge: Needs Met       Therapy recommendations for staff:   Independent for ambulation with use of No AD within community room    History of Present Illness: per Dr Gallego H&P 6/3/24:  \"Patient seen in room on Adult Behavioral Unit.   Patient is a 78 y.o. female who presented to the ED at [facility]. She presented with memory loss and depressed mood.   She is struggling to recall why she is here. Disoriented to place, date, age. She read from her journal but most information was nonspecific as she was trying to cover for her loss of memory. She state that she has Alzheimer's .      Per Krishna garg   Pt has been experiencing a decline in her cognitive status for a few years but recently diagnosed with alzheimer's by neuro psych with mild dementia and the decline is becoming more prominent and challenging for her day to day functioning. Her PCP prescribes her mood and physical health medications.      Pt states her most concerning trigger for her mood and SI is not beng able to socialize \"because I can't remember things.\" She describes being in social settings and not being able to contribute to the discussions, feeling embarrassed \"feeling stupid because it's going too fast for me.  Last time I just felt lost and confused all day. It has created a really serious depression in which I think why am

## 2024-06-06 PROCEDURE — 6370000000 HC RX 637 (ALT 250 FOR IP): Performed by: PSYCHIATRY & NEUROLOGY

## 2024-06-06 PROCEDURE — 99233 SBSQ HOSP IP/OBS HIGH 50: CPT | Performed by: PSYCHIATRY & NEUROLOGY

## 2024-06-06 PROCEDURE — 1240000000 HC EMOTIONAL WELLNESS R&B

## 2024-06-06 RX ADMIN — PANTOPRAZOLE SODIUM 40 MG: 40 TABLET, DELAYED RELEASE ORAL at 09:59

## 2024-06-06 RX ADMIN — BUPROPION HYDROCHLORIDE 200 MG: 100 TABLET, FILM COATED ORAL at 06:52

## 2024-06-06 RX ADMIN — FLUOXETINE HYDROCHLORIDE 60 MG: 20 CAPSULE ORAL at 21:05

## 2024-06-06 RX ADMIN — TRAZODONE HYDROCHLORIDE 50 MG: 50 TABLET ORAL at 21:04

## 2024-06-06 RX ADMIN — DONEPEZIL HYDROCHLORIDE 10 MG: 5 TABLET, FILM COATED ORAL at 21:04

## 2024-06-06 RX ADMIN — MEMANTINE HYDROCHLORIDE 5 MG: 5 TABLET ORAL at 09:59

## 2024-06-06 RX ADMIN — ATORVASTATIN CALCIUM 20 MG: 10 TABLET, FILM COATED ORAL at 21:05

## 2024-06-06 RX ADMIN — ACETAMINOPHEN 650 MG: 325 TABLET ORAL at 13:47

## 2024-06-06 ASSESSMENT — PAIN SCALES - GENERAL
PAINLEVEL_OUTOF10: 4
PAINLEVEL_OUTOF10: 0
PAINLEVEL_OUTOF10: 0

## 2024-06-06 ASSESSMENT — PAIN SCALES - WONG BAKER: WONGBAKER_NUMERICALRESPONSE: NO HURT

## 2024-06-06 ASSESSMENT — PAIN DESCRIPTION - LOCATION: LOCATION: JAW

## 2024-06-06 NOTE — PLAN OF CARE
Patient is alert and oriented x 3. Takes medications whole with water and without issue. Attends groups. Patient is independent and continent. Calm, friendly and cooperative with staff. Is visible on unit.  Interacting with peers appropriately. Denies AH/VH/SI/HI and no RTIS noted.  No signs or symptoms of distress.

## 2024-06-06 NOTE — PROGRESS NOTES
Group Therapy Note    Date: 6/6/2024  Start Time: 1000  End Time:  1100  Number of Participants: 4    Type of Group: Music and Spirituality    Patient's Goal:  Participation    Notes:   facilitated discussion on spirituality, focusing on connection, meaning, and hope, through the medium of music. Pt actively participated by making song selections, singing along to music, and sharing reflections on songs.    Participation Level: Active Listener and Interactive    Participation Quality: Appropriate, Attentive, and Sharing      Speech:  normal      Affective Functioning: Congruent      Endings: None Reported    Modes of Intervention: Support, Socialization, Exploration, Activity, and Media      Discipline Responsible:       Signature:  Boy Oden       06/06/24 1415   Encounter Summary   Encounter Overview/Reason Behavioral Health   Service Provided For Patient   Last Encounter    (6/6 Music and Spirituality Group)   Complexity of Encounter Moderate   Begin Time 1000   End Time  1100   Total Time Calculated 60 min   Behavioral Health    Type  Spirituality Group

## 2024-06-06 NOTE — PROGRESS NOTES
Department of Psychiatry  AttendingProgress Note  Chief Complaint: dementia   Osmani has been cooperative in program. She is easily confused with general information. Unable to recall dates, visitors and plans. She would like to go home soon and we discussed getting additional services in the home.   Continue current meds.   Patient's chart was reviewed and collaborated with  about the treatment plan.  SUBJECTIVE:    Patient is feeling unchanged. Suicidal ideation:  denies suicidal ideation.  Patient does not have medication side effects.    ROS: Patient has new complaints: no  Sleeping adequately:  Yes   Appetite adequate: Yes  Attending groups: Yes  Visitors:Yes    OBJECTIVE    Physical  VITALS:  BP (!) 140/90   Pulse 61   Temp 97.6 °F (36.4 °C) (Temporal)   Resp 18   Ht 1.651 m (5' 5\")   Wt 68 kg (150 lb)   SpO2 100%   BMI 24.96 kg/m²     Mental Status Examination:  Patients appearance was hospital attire. Thoughts are Goal directed. Homicidal ideations none.  No abnormal movements, tics or mannerisms.  Memory impaired Aims 0. Concentration Good.   Alert and oriented X 4. Insight and Judgement impaired insight. Patient was cooperative. Patient gait normal. Mood within normal limits, affect normal affect Hallucinations Absent, suicidal ideations no specific plan to harm self Speech normal volume  Data  Labs:   Admission on 06/02/2024   Component Date Value Ref Range Status    WBC 06/02/2024 6.4  4.0 - 11.0 K/uL Final    RBC 06/02/2024 3.49 (L)  4.00 - 5.20 M/uL Final    Hemoglobin 06/02/2024 10.5 (L)  12.0 - 16.0 g/dL Final    Hematocrit 06/02/2024 31.3 (L)  36.0 - 48.0 % Final    MCV 06/02/2024 89.6  80.0 - 100.0 fL Final    MCH 06/02/2024 30.2  26.0 - 34.0 pg Final    MCHC 06/02/2024 33.7  31.0 - 36.0 g/dL Final    RDW 06/02/2024 16.0 (H)  12.4 - 15.4 % Final    Platelets 06/02/2024 264  135 - 450 K/uL Final    MPV 06/02/2024 8.1  5.0 - 10.5 fL Final    Neutrophils % 06/02/2024 58.3  % Final      Medications  Current Facility-Administered Medications: memantine (NAMENDA) tablet 5 mg, 5 mg, Oral, Daily  atorvastatin (LIPITOR) tablet 20 mg, 20 mg, Oral, QHS  buPROPion (WELLBUTRIN) tablet 200 mg, 200 mg, Oral, Daily  donepezil (ARICEPT) tablet 10 mg, 10 mg, Oral, Nightly  FLUoxetine (PROZAC) capsule 60 mg, 60 mg, Oral, Nightly  pantoprazole (PROTONIX) tablet 40 mg, 40 mg, Oral, Daily  acetaminophen (TYLENOL) tablet 650 mg, 650 mg, Oral, Q6H PRN  hydrOXYzine HCl (ATARAX) tablet 25 mg, 25 mg, Oral, TID PRN  traZODone (DESYREL) tablet 50 mg, 50 mg, Oral, Nightly PRN    ASSESSMENT AND PLAN    Principal Problem:    Dementia with behavioral disturbance (HCC)  Active Problems:    Depressive disorder    Suicidal ideation    Mitral valve insufficiency  Resolved Problems:    * No resolved hospital problems. *       1.Patient s symptoms   show no change  2.Probable discharge is tomorrow  3.Discharge planning is complete  4. Suicidal ideation is  none  5. Total time with patient was 50 minutes and more than 50 % of that time was spent counseling the patient on their symptoms, treatment and expected goals.        Maicol Gallego MD  Physician Psychiatry

## 2024-06-06 NOTE — GROUP NOTE
Group Therapy Note    Date: 6/6/2024    Group Start Time: 1100  Group End Time: 1130  Group Topic: Recreational    [facility]    Batsheva Callaway LISW        Group Therapy Note    Attendees: 2       Patient's Goal: to promote socialization by reminiscing with each other.     Notes: pt attended group for full duration. She was active in group discussion and social with peers.                                                               Status After Intervention:  Improved    Participation Level: Active Listener and Interactive    Participation Quality: Appropriate, Attentive, and Sharing      Speech:  normal      Thought Process/Content: Logical      Affective Functioning: Congruent      Mood: euthymic      Level of consciousness:  Alert and Attentive      Response to Learning: Able to verbalize current knowledge/experience and Progressing to goal      Endings: None Reported    Modes of Intervention: Support and Socialization      Discipline Responsible: /Counselor      Signature:  COLIN Cooper

## 2024-06-06 NOTE — BH NOTE
Patient alert to self, pleasant, visible on unit, social with peers, compliant with medications, independent with steady gait. Denies any thoughts of self harm, denies A/V/H, denies depression or anxiety, will monitor for safety and offer support as needed.

## 2024-06-06 NOTE — GROUP NOTE
Group Therapy Note    Date: 6/6/2024    Group Start Time: 1345  Group End Time: 1415  Group Topic: Recreational    [facility]    Maria A Wang, RT        Group Therapy Note    Attendees: 4    Topic: Leisure A-Z    Group discussed the important role leisure plays in overall well-being.  Participants shared current hobbies and interests as well as activities they enjoyed when they were younger.  After, group worked together to come up with different leisure activities for each letter in the alphabet.  Target outcomes included exercising cognitive abilities, improving social connection, and encouraging healthy leisure participation.      Notes:  Sheri was present for the first half of group before being excused early from group to meet with her visitor.  While in group, Sheri was interactive with peers and willing to socialize.      Discipline Responsible: Psychoeducational Specialist and Recreational Therapist      Signature:  Maria A Wang MA, CTRS

## 2024-06-06 NOTE — PLAN OF CARE
Problem: Risk for Elopement  Goal: Patient will not exit the unit/facility without proper excort  6/5/2024 2046 by Irene Ramsay LPN  Outcome: Progressing  Flowsheets (Taken 6/5/2024 2042)  Nursing Interventions for Elopement Risk:   Assist with personal care needs such as toileting, eating, dressing, as needed to reduce the risk of wandering   Reduce environmental triggers     Problem: Self Harm/Suicidality  Goal: Will have no self-injury during hospital stay  Description: INTERVENTIONS:  1.  Ensure constant observer at bedside with Q15M safety checks  2.  Maintain a safe environment  3.  Secure patient belongings  4.  Ensure family/visitors adhere to safety recommendations  5.  Ensure safety tray has been added to patient's diet order  6.  Every shift and PRN: Re-assess suicidal risk via Frequent Screener    Outcome: Progressing  Flowsheets (Taken 6/5/2024 2042)  Will have no self-injury during hospital stay:   Maintain a safe environment   Every shift and PRN: Re-assess suicidal risk via Frequent Screener     Problem: Depression  Goal: Will be euthymic at discharge  Description: INTERVENTIONS:  1. Administer medication as ordered  2. Provide emotional support via 1:1 interaction with staff  3. Encourage involvement in milieu/groups/activities  4. Monitor for social isolation  Outcome: Progressing     Problem: Sleep Disturbance  Goal: Will exhibit normal sleeping pattern  Description: INTERVENTIONS:  1. Administer medication as ordered  2. Decrease environmental stimuli, including noise, as appropriate  3. Discourage social isolation and naps during the day  Outcome: Progressing     Problem: Pain  Goal: Verbalizes/displays adequate comfort level or baseline comfort level  Outcome: Progressing     Problem: Safety - Adult  Goal: Free from fall injury  6/5/2024 2046 by Irene Ramsay LPN  Outcome: Progressing

## 2024-06-06 NOTE — BH NOTE
Writer attempted to reach pt's son Eusebio [phone], to discuss pt's status and answer any questions that family may have as we are working toward discharge. A VM was left for a return call.    Jerry Rice MSW, LSW

## 2024-06-06 NOTE — BH NOTE
PRN Trazodone 50 mg PO administered for sleep, patient reports no change in headache remains 5/10.    0150 Patient stated headache is gone.

## 2024-06-07 PROCEDURE — 99233 SBSQ HOSP IP/OBS HIGH 50: CPT | Performed by: PSYCHIATRY & NEUROLOGY

## 2024-06-07 PROCEDURE — 6370000000 HC RX 637 (ALT 250 FOR IP): Performed by: PSYCHIATRY & NEUROLOGY

## 2024-06-07 PROCEDURE — 1240000000 HC EMOTIONAL WELLNESS R&B

## 2024-06-07 RX ADMIN — FLUOXETINE HYDROCHLORIDE 60 MG: 20 CAPSULE ORAL at 20:29

## 2024-06-07 RX ADMIN — DONEPEZIL HYDROCHLORIDE 10 MG: 5 TABLET, FILM COATED ORAL at 20:29

## 2024-06-07 RX ADMIN — ACETAMINOPHEN 650 MG: 325 TABLET ORAL at 16:38

## 2024-06-07 RX ADMIN — BUPROPION HYDROCHLORIDE 200 MG: 100 TABLET, FILM COATED ORAL at 06:27

## 2024-06-07 RX ADMIN — MEMANTINE HYDROCHLORIDE 5 MG: 5 TABLET ORAL at 08:59

## 2024-06-07 RX ADMIN — ATORVASTATIN CALCIUM 20 MG: 10 TABLET, FILM COATED ORAL at 20:29

## 2024-06-07 RX ADMIN — PANTOPRAZOLE SODIUM 40 MG: 40 TABLET, DELAYED RELEASE ORAL at 08:59

## 2024-06-07 RX ADMIN — TRAZODONE HYDROCHLORIDE 50 MG: 50 TABLET ORAL at 20:29

## 2024-06-07 ASSESSMENT — PAIN SCALES - GENERAL: PAINLEVEL_OUTOF10: 0

## 2024-06-07 ASSESSMENT — PAIN DESCRIPTION - LOCATION: LOCATION: HEAD

## 2024-06-07 NOTE — PLAN OF CARE
Problem: Risk for Elopement  Goal: Patient will not exit the unit/facility without proper excort  6/6/2024 2040 by Kasey Valdez LPN  Outcome: Progressing  Flowsheets (Taken 6/6/2024 2034)  Nursing Interventions for Elopement Risk:   Assist with personal care needs such as toileting, eating, dressing, as needed to reduce the risk of wandering   Reduce environmental triggers     Problem: Self Harm/Suicidality  Goal: Will have no self-injury during hospital stay  Description: INTERVENTIONS:  1.  Ensure constant observer at bedside with Q15M safety checks  2.  Maintain a safe environment  3.  Secure patient belongings  4.  Ensure family/visitors adhere to safety recommendations  5.  Ensure safety tray has been added to patient's diet order  6.  Every shift and PRN: Re-assess suicidal risk via Frequent Screener    6/6/2024 2040 by Kasey Valdez LPN  Outcome: Progressing  Flowsheets (Taken 6/6/2024 2034)  Will have no self-injury during hospital stay:   Maintain a safe environment   Secure patient belongings     Problem: Depression  Goal: Will be euthymic at discharge  Description: INTERVENTIONS:  1. Administer medication as ordered  2. Provide emotional support via 1:1 interaction with staff  3. Encourage involvement in milieu/groups/activities  4. Monitor for social isolation  6/6/2024 2040 by Kasey Valdez LPN  Outcome: Progressing     Problem: Sleep Disturbance  Goal: Will exhibit normal sleeping pattern  Description: INTERVENTIONS:  1. Administer medication as ordered  2. Decrease environmental stimuli, including noise, as appropriate  3. Discourage social isolation and naps during the day  6/6/2024 2040 by Kasey Valdez LPN  Outcome: Progressing     Problem: Involuntary Admit  Goal: Will cooperate with staff recommendations and doctor's orders and will demonstrate appropriate behavior  Description: INTERVENTIONS:  1. Treat underlying conditions and offer medication as ordered  2. Educate

## 2024-06-07 NOTE — PLAN OF CARE
Patient is alert and oriented x 3. Takes medications whole with water and without issue. Attends groups. Patient is independent and continent. Calm, friendly and cooperative with staff. Is visible on unit.  Interacts with peers appropriately. Has a positive mood. Denies AH/VH/SI/HI and no RTIS noted.  No signs or symptoms of distress.       Problem: Risk for Elopement  Goal: Patient will not exit the unit/facility without proper excort  6/7/2024 0933 by Patsy Krause RN  Outcome: Progressing  6/6/2024 2040 by Kasey Valdez LPN  Outcome: Progressing  Flowsheets (Taken 6/6/2024 2034)  Nursing Interventions for Elopement Risk:   Assist with personal care needs such as toileting, eating, dressing, as needed to reduce the risk of wandering   Reduce environmental triggers     Problem: Self Harm/Suicidality  Goal: Will have no self-injury during hospital stay  Description: INTERVENTIONS:  1.  Ensure constant observer at bedside with Q15M safety checks  2.  Maintain a safe environment  3.  Secure patient belongings  4.  Ensure family/visitors adhere to safety recommendations  5.  Ensure safety tray has been added to patient's diet order  6.  Every shift and PRN: Re-assess suicidal risk via Frequent Screener    6/7/2024 0933 by Patsy Krause RN  Outcome: Progressing  6/6/2024 2040 by Kasey Valdez LPN  Outcome: Progressing  Flowsheets (Taken 6/6/2024 2034)  Will have no self-injury during hospital stay:   Maintain a safe environment   Secure patient belongings     Problem: Depression  Goal: Will be euthymic at discharge  Description: INTERVENTIONS:  1. Administer medication as ordered  2. Provide emotional support via 1:1 interaction with staff  3. Encourage involvement in milieu/groups/activities  4. Monitor for social isolation  6/7/2024 0933 by Patsy Krause RN  Outcome: Progressing  6/6/2024 2040 by Kasey Valdez LPN  Outcome: Progressing     Problem: Sleep Disturbance  Goal: Will exhibit

## 2024-06-07 NOTE — BH NOTE
Writer spoke to pt's son, Eusebio [phone], to provide updates on pt's progress and to discuss discharge options. Eusebio shared that family has been working to establish shifts with family and friends to provide interaction and assistance to pt during the day and at night. Family feels prepared to get pt home and will be assisting her in keeping all her appointments with her physicians. They shared that she is already connected to Neurology with the [facility] and will be setting follow up with them for next week if possible.    Family is open to referral for a therapist to provide pt with another outlet for coping skills education and formal emotional support. Should pt discharge over the weekend, it is recommended that she be given information to connect with  Psychiatry if at all possible to keep her services within the same system to increase collaboration. If this is not possible then a connection with [facility] services would also be appropriate.    Jerry Rice MSW, LSW

## 2024-06-07 NOTE — GROUP NOTE
Group Therapy Note    Date: 6/7/2024    Group Start Time: 1000  Group End Time: 1145  Group Topic: Recreational    [facility] OP Becky Lopes MSW        Group Therapy Note    Attendees: 4    Facilitator led a socialization and cognitive skills group. Patients participated in several dice games, such as Dice Bingo, Sevens, All Sixes, and conversation dice. Instructions were provided before each game and prompts were given as needed to ensure patients stayed on task. Patients took turns, worked as a team, completed simple additions, and strengthened recent memory recall and fine motor skills. Patients also developed group cohesion through the use of question dice and in providing support and encouragement during group games.       Notes:  Patient was engaged in group discussion and participated fully in activity. Patient worked well independently as well as in a group. She stayed on task and was able to retain instructions with no prompts or reminders needed.     Status After Intervention:  Improved    Participation Level: Active Listener and Interactive    Participation Quality: Appropriate, Attentive, Sharing, and Supportive      Speech:  normal      Thought Process/Content: Logical  Linear      Affective Functioning: Congruent      Mood: euthymic      Level of consciousness:  Alert, Oriented x4, and Attentive      Response to Learning: Able to verbalize current knowledge/experience, Able to verbalize/acknowledge new learning, Able to retain information, Capable of insight, Able to change behavior, and Progressing to goal      Endings: None Reported    Modes of Intervention: Socialization, Exploration, and Activity      Discipline Responsible: /Counselor      Signature:  JEANNIE Moss

## 2024-06-07 NOTE — PROGRESS NOTES
Department of Psychiatry  Progress Note    Patient's chart was reviewed. Discussed with treatment team. Met with patient.     SUBJECTIVE:    Patient states she is \"good\" today.     She is cooperative, pleasant and participatory in group. No complaints.     Superficially oriented but remains impaired.     ROS:   Patient has new complaints: no  Sleeping adequately:  Yes   Appetite adequate: Yes  Engaged in programming: Yes    OBJECTIVE:  VITALS:  BP (!) 144/79   Pulse 56   Temp 97.4 °F (36.3 °C) (Temporal)   Resp 16   Ht 1.651 m (5' 5\")   Wt 68 kg (150 lb)   SpO2 98%   BMI 24.96 kg/m²     Mental Status Examination:    Appearance: fair grooming and hygiene  Behavior/Attitude toward examiner:  cooperative, attentive and good eye contact  Speech: Normal rate, volume, amount  Mood:  \"good\"  Affect:  mood congruent     Thought processes:  Goal directed, linear, no PIOTR or gross disorganization  Thought Content: no SI, no HI, no delusions voiced, no obsessions  Perceptions: no AVH  Attention: fair  Abstraction: fair  Cognition:  limited  Insight: limited  Judgment: limited    Medication:  Scheduled:   memantine  5 mg Oral Daily    atorvastatin  20 mg Oral QHS    buPROPion  200 mg Oral Daily    donepezil  10 mg Oral Nightly    FLUoxetine  60 mg Oral Nightly    pantoprazole  40 mg Oral Daily        PRN:  acetaminophen, hydrOXYzine HCl, traZODone     ASSESSMENT AND PLAN:    Principal Problem:    Dementia with behavioral disturbance (HCC)  Active Problems:    Depressive disorder    Mitral valve insufficiency  Resolved Problems:    * No resolved hospital problems. *       1.Patient s symptoms   show no change  2.Probable discharge is undetermined.  3.Discharge planning is incomplete  4.Suicidal ideation is none.     Total time with patient was 50 minutes and more than 50 % of that time was spent counseling the patient on their symptoms, treatment, and expected goals.       Brett Holly MD

## 2024-06-07 NOTE — GROUP NOTE
Group Therapy Note    Date: 6/7/2024    Group Start Time: 1330  Group End Time: 1415  Group Topic: Cognitive Skills    [facility]    Diana Sutton        Group Therapy Note    Attendees: 4    Group members were asked various questions from a deck of \"Table Topics\" cards. The goal of group was to evoke memories, stimulate mental activity, promote social interaction, and improve well-being.     Notes:  Patient attended group for the full duration. Patient remained engaged and interacted appropriately with other members of the group.     Status After Intervention:  Improved    Participation Level: Active Listener and Interactive    Participation Quality: Appropriate, Attentive, and Sharing      Speech:  normal      Thought Process/Content: Logical      Affective Functioning: Congruent      Mood: euthymic      Level of consciousness:  Alert, Oriented x4, and Attentive      Response to Learning: Able to verbalize current knowledge/experience      Endings: None Reported    Modes of Intervention: Socialization, Exploration, and Activity      Discipline Responsible: Behavorial Health Tech      Signature:  JEANNIE MINYAA

## 2024-06-08 VITALS
TEMPERATURE: 97.4 F | OXYGEN SATURATION: 95 % | SYSTOLIC BLOOD PRESSURE: 130 MMHG | HEIGHT: 65 IN | DIASTOLIC BLOOD PRESSURE: 81 MMHG | WEIGHT: 150 LBS | BODY MASS INDEX: 24.99 KG/M2 | RESPIRATION RATE: 17 BRPM | HEART RATE: 55 BPM

## 2024-06-08 PROCEDURE — 99239 HOSP IP/OBS DSCHRG MGMT >30: CPT | Performed by: PSYCHIATRY & NEUROLOGY

## 2024-06-08 PROCEDURE — 6370000000 HC RX 637 (ALT 250 FOR IP): Performed by: PSYCHIATRY & NEUROLOGY

## 2024-06-08 RX ORDER — PANTOPRAZOLE SODIUM 40 MG/1
40 TABLET, DELAYED RELEASE ORAL DAILY
Qty: 30 TABLET | Refills: 0 | Status: SHIPPED | OUTPATIENT
Start: 2024-06-09

## 2024-06-08 RX ORDER — BUPROPION HYDROCHLORIDE 200 MG/1
200 TABLET, EXTENDED RELEASE ORAL DAILY
Qty: 30 TABLET | Refills: 0 | Status: SHIPPED | OUTPATIENT
Start: 2024-06-08

## 2024-06-08 RX ORDER — MEMANTINE HYDROCHLORIDE 5 MG/1
5 TABLET ORAL DAILY
Qty: 30 TABLET | Refills: 0 | Status: SHIPPED | OUTPATIENT
Start: 2024-06-09

## 2024-06-08 RX ADMIN — BUPROPION HYDROCHLORIDE 200 MG: 100 TABLET, FILM COATED ORAL at 08:36

## 2024-06-08 RX ADMIN — PANTOPRAZOLE SODIUM 40 MG: 40 TABLET, DELAYED RELEASE ORAL at 08:36

## 2024-06-08 RX ADMIN — MEMANTINE HYDROCHLORIDE 5 MG: 5 TABLET ORAL at 08:36

## 2024-06-08 NOTE — PROGRESS NOTES
Received a phone call from Eusebio, pt's son. He is hoping pt will be discharged today. He reports they have everything set up at home and would like her to come home today if possible. Will relay message to the doctor and writer told Eusebio that he would receive a call back from staff later today     Eusebio updated that pt will be discharging today. He reports coming to pick her up around 1430

## 2024-06-08 NOTE — PLAN OF CARE
Problem: Depression  Goal: Will be euthymic at discharge  Description: INTERVENTIONS:  1. Administer medication as ordered  2. Provide emotional support via 1:1 interaction with staff  3. Encourage involvement in milieu/groups/activities  4. Monitor for social isolation  6/8/2024 0919 by Stephanie Rogers RN  Outcome: Progressing  Note: Patient is preoccupied at times. Appears to be happy today. Laughing and joking with peers.  6/8/2024 0318 by Aditi Glass RN  Outcome: Progressing     Problem: Pain  Goal: Verbalizes/displays adequate comfort level or baseline comfort level  6/8/2024 0922 by Stephanie Rogers RN  Outcome: Progressing  Flowsheets (Taken 6/8/2024 0922)  Verbalizes/displays adequate comfort level or baseline comfort level:   Encourage patient to monitor pain and request assistance   Assess pain using appropriate pain scale   Administer analgesics based on type and severity of pain and evaluate response  Note: Patient denies pain today. Aware of pain scale and educated to inform staff of pain and to request pain medications. Verbalized understanding  6/8/2024 0318 by Aditi Glass RN  Outcome: Progressing     Problem: Safety - Adult  Goal: Free from fall injury  6/8/2024 0919 by Stephanie Rogers RN  Outcome: Progressing  Note: Patient will continue on safety precautions. Gripper socks intact to bilateral feet. Instructed to advise needs to staff.  6/8/2024 0318 by Aditi Glass RN  Outcome: Progressing

## 2024-06-08 NOTE — PLAN OF CARE
Problem: Risk for Elopement  Goal: Patient will not exit the unit/facility without proper excort  6/8/2024 1429 by Stephanie Rogers RN  Outcome: Completed  Flowsheets (Taken 6/8/2024 0928)  Nursing Interventions for Elopement Risk:   Assist with personal care needs such as toileting, eating, dressing, as needed to reduce the risk of wandering   Reduce environmental triggers   Make sure patient has all necessary personal care items  6/8/2024 0318 by Aditi Glass RN  Outcome: Progressing     Problem: Self Harm/Suicidality  Goal: Will have no self-injury during hospital stay  Description: INTERVENTIONS:  1.  Ensure constant observer at bedside with Q15M safety checks  2.  Maintain a safe environment  3.  Secure patient belongings  4.  Ensure family/visitors adhere to safety recommendations  5.  Ensure safety tray has been added to patient's diet order  6.  Every shift and PRN: Re-assess suicidal risk via Frequent Screener    6/8/2024 1429 by Stephanie Rogers RN  Outcome: Completed  Flowsheets (Taken 6/8/2024 0928)  Will have no self-injury during hospital stay:   Ensure constant observer at bedside with Q15M safety checks   Maintain a safe environment   Secure patient belongings   Ensure family/visitors adhere to safety recommendations   Ensure safety tray has been added to patient's diet order   Every shift and PRN: Re-assess suicidal risk via Frequent Screener  6/8/2024 0318 by Aditi Glass RN  Outcome: Progressing     Problem: Depression  Goal: Will be euthymic at discharge  Description: INTERVENTIONS:  1. Administer medication as ordered  2. Provide emotional support via 1:1 interaction with staff  3. Encourage involvement in milieu/groups/activities  4. Monitor for social isolation  6/8/2024 1429 by Stephanie Rogers RN  Outcome: Completed  6/8/2024 0919 by Stephanie Rogers RN  Outcome: Progressing  Note: Patient is preoccupied at times. Appears to  HILARY Pennington  Outcome: Progressing     Problem: Safety - Adult  Goal: Free from fall injury  6/8/2024 1429 by Stephanie Rogers, RN  Outcome: Completed  6/8/2024 0919 by Stephanie Rogers, RN  Outcome: Progressing  Note: Patient will continue on safety precautions. Gripper socks intact to bilateral feet. Instructed to advise needs to staff.  6/8/2024 0318 by Aditi Glass, RN  Outcome: Progressing

## 2024-06-08 NOTE — BH NOTE
Bridge Appointment completed: Reviewed Discharge Instructions with patient.    Patient verbalizes understanding and agreement with the discharge plan using the teachback method.     Referral for Outpatient Tobacco Cessation Counseling, upon discharge (paola X if applicable and completed):    ( )  Hospital staff assisted patient to call Quit Line or faxed referral                                   during hospitalization                  ( )  Recognizing danger situations (included triggers and roadblocks), if not completed on admission                    ( )  Coping skills (new ways to manage stress, exercise, relaxation techniques, changing routine, distraction), if not completed on admission                                                           ( )  Basic information about quitting (benefits of quitting, techniques in how to quit, available resources, if not completed on admission  () [facility]   ( X) Patient refused referral  ( X) Patient refused counseling  ( X) Patient refused smoking cessation medication upon discharge  Patient denies smoking within the last 30 days.    Vaccinations (paola X if applicable and completed):  ( x) Patient states already received influenza vaccine elsewhere  ( ) Patient received influenza vaccine during this hospitalization  ( ) Patient refused influenza vaccine at this time

## 2024-06-08 NOTE — PLAN OF CARE
Sheri has been visible in the dayroom and social with her peers. She was pleasant and friendly during each interaction with this writer, other staff, and her peers. This writer didn't note that Sheri had any episodes of obvious anxiety or confusion. She didn't ask any questions in an attempt to reorient herself and didn't make any nonsensical or delusional statements to this writer this shift. This writer did not note her to be responding to internal stimuli at any time. Sheri was compliant with scheduled nightly medications and requested PRN Trazodone 50 mg PO to help with sleep.   Sheri denies SI, HI, and AVH.     Problem: Risk for Elopement  Goal: Patient will not exit the unit/facility without proper excort  Outcome: Progressing     Problem: Self Harm/Suicidality  Goal: Will have no self-injury during hospital stay  Description: INTERVENTIONS:  1.  Ensure constant observer at bedside with Q15M safety checks  2.  Maintain a safe environment  3.  Secure patient belongings  4.  Ensure family/visitors adhere to safety recommendations  5.  Ensure safety tray has been added to patient's diet order  6.  Every shift and PRN: Re-assess suicidal risk via Frequent Screener    Outcome: Progressing     Problem: Depression  Goal: Will be euthymic at discharge  Description: INTERVENTIONS:  1. Administer medication as ordered  2. Provide emotional support via 1:1 interaction with staff  3. Encourage involvement in milieu/groups/activities  4. Monitor for social isolation  Outcome: Progressing     Problem: Sleep Disturbance  Goal: Will exhibit normal sleeping pattern  Description: INTERVENTIONS:  1. Administer medication as ordered  2. Decrease environmental stimuli, including noise, as appropriate  3. Discourage social isolation and naps during the day  Outcome: Progressing     Problem: Pain  Goal: Verbalizes/displays adequate comfort level or baseline comfort level  Outcome: Progressing     Problem: Safety - Adult  Goal: Free

## 2024-06-08 NOTE — TRANSITION OF CARE
Behavioral Health Transition Record    Patient Name: Alba Abdi  YOB: 1945   Medical Record Number: [id number]  Date of Admission: 6/2/2024  3:02 AM   Date of Discharge: 6/8/2024    Attending Provider: Maicol Gallego MD   Discharging Provider: Maicol Gallego MD  To contact this individual call [phone] and ask the  to page.  If unavailable, ask to be transferred to Behavioral Health Provider on call.  A Behavioral Health Provider will be available on call 24/7 and during holidays.    Primary Care Provider: Sylvia Lewis DO    Allergies   Allergen Reactions    Cefaclor Itching     itching      Ibuprofen      Can not take due to ulcer    Sulfa Antibiotics Rash     Unknown child         Reason for Admission: Dementia with behavioral disturbance    Admission Diagnosis: Suicidal ideation [R45.851]  Depression, unspecified [F32.A]    * No surgery found *    Results for orders placed or performed during the hospital encounter of 06/02/24   Culture, Urine    Specimen: Urine, clean catch   Result Value Ref Range    Urine Culture, Routine (A)      <10,000 CFU/ml mixed skin/urogenital giancarlo. No further workup    Organism Strep agalactiae (Beta Strep Group B) (A)     Urine Culture, Routine       25,000 CFU/ml  Susceptibility testing of penicillin and other beta lactams is  not necessary for beta hemolytic Streptococci since resistant  strains have not been identified. (CLSI M100)     COVID-19, Rapid    Specimen: Nasopharyngeal Swab   Result Value Ref Range    SARS-CoV-2, NAAT Not Detected Not Detected   CBC with Auto Differential   Result Value Ref Range    WBC 6.4 4.0 - 11.0 K/uL    RBC 3.49 (L) 4.00 - 5.20 M/uL    Hemoglobin 10.5 (L) 12.0 - 16.0 g/dL    Hematocrit 31.3 (L) 36.0 - 48.0 %    MCV 89.6 80.0 - 100.0 fL    MCH 30.2 26.0 - 34.0 pg    MCHC 33.7 31.0 - 36.0 g/dL    RDW 16.0 (H) 12.4 - 15.4 %    Platelets 264 135 - 450 K/uL    MPV 8.1 5.0 - 10.5 fL    Neutrophils % 58.3 %  obtained from the EMR)    Estimated Body Mass Index  Body mass index is 24.96 kg/m².      Vital Signs/Blood Pressure  /81   Pulse 55   Temp 97.4 °F (36.3 °C) (Oral)   Resp 17   Ht 1.651 m (5' 5\")   Wt 68 kg (150 lb)   SpO2 95%   BMI 24.96 kg/m²      Fasting Blood Glucose or Hemoglobin A1c  No results found for: \"GLU\", \"GLUCPOC\"    No results found for: \"LABA1C\", \"AGV1JHDB\"    Discharge Diagnosis: Dementia    Discharge Plan/Destination: home    Discharge Medication List and Instructions:      Medication List        START taking these medications      memantine 5 MG tablet  Commonly known as: NAMENDA  Take 1 tablet by mouth daily  Start taking on: June 9, 2024            CONTINUE taking these medications      atorvastatin 20 MG tablet  Commonly known as: LIPITOR     buPROPion 200 MG extended release tablet  Commonly known as: WELLBUTRIN SR  Take 1 tablet by mouth daily     donepezil 10 MG tablet  Commonly known as: ARICEPT     FLUoxetine 20 MG capsule  Commonly known as: PROZAC     hydrOXYzine HCl 25 MG tablet  Commonly known as: ATARAX     pantoprazole 40 MG tablet  Commonly known as: PROTONIX  Take 1 tablet by mouth daily  Start taking on: June 9, 2024            STOP taking these medications      amphetamine-dextroamphetamine 20 MG tablet  Commonly known as: ADDERALL               Where to Get Your Medications        These medications were sent to [facility] - [place], [place] - [address] - P [phone] - F [phone]  [address], [place] [facility] [address]      Phone: [phone]   buPROPion 200 MG extended release tablet  memantine 5 MG tablet  pantoprazole 40 MG tablet         Unresulted Labs (24h ago, onward)      None            To obtain results of studies pending at discharge, please contact [phone]    Follow-up Information       Follow up With Specialties Details Why Contact Sylvia Enriquez, DO  Follow up Hospital Discharge Follow Up @ 643

## 2024-06-08 NOTE — BH NOTE
[facility]  Discharge Note    Pt discharged with followings belongings:   Dental Appliances: None  Vision - Corrective Lenses: At home  Hearing Aid: None  Jewelry: At bedside  Body Piercings Removed: No  Clothing: Footwear, Pajamas, Pants, Shirt, Shorts, Robe, Undergarments  Other Valuables: At home   Valuables sent home with caregiver and patient. All personal belongings signed for and accounted for by caregiver Andres.Patient educated on aftercare instructions along with caregiver. Copies of AVS sent with caregiver and for each son. Caregiver and patient both educated and  verbalize understanding of AVS:  .    Status EXAM upon discharge:  Mental Status and Behavioral Exam  Normal: No  Level of Assistance: Independent/Self  Facial Expression: Brightened  Affect: Congruent  Level of Consciousness: Alert  Frequency of Checks: 4 times per hour, close  Mood:Normal: No  Mood: Depressed  Motor Activity:Normal: Yes  Motor Activity: Decreased  Eye Contact: Good  Observed Behavior: Friendly, Cooperative  Sexual Misconduct History: Current - no  Preception: [place] to person, [place] to place  Attention:Normal: No  Attention: Distractible  Thought Processes: Unremarkable  Thought Content:Normal: No  Thought Content: Preoccupations  Depression Symptoms: Impaired concentration  Anxiety Symptoms: Generalized  Terri Symptoms: No problems reported or observed.  Hallucinations: None  Delusions: No  Memory:Normal: No  Memory: Poor remote, Poor recent  Insight and Judgment: No  Insight and Judgment: Poor judgment, Poor insight    Tobacco Screening:  Practical Counseling, on admission, paola X, if applicable and completed (first 3 are required if patient doesn't refuse):            ( ) Recognizing danger situations (included triggers and roadblocks)                    ( ) Coping skills (new ways to manage stress,relaxation techniques, changing routine, distraction)                                                            ( ) Basic information about quitting (benefits of quitting, techniques in how to quit, available resources  ( ) [facility]                                                                                                                     ( ) Patient refused counseling  ( ) Patient refused referral  ( ) Patient refused prescription upon discharge  ( x) Patient has not smoked in the last 30 days    Metabolic Screening:    No results found for: \"LABA1C\"    No results found for: \"CHOL\"  No results found for: \"TRIG\"  No results found for: \"HDL\"  No components found for: \"LDLCAL\"  No components found for: \"LABVLDL\"    Stephanie Le RN  Caregiver here to transport patient to home. All personal belongings sent with patient and caregiver. Understanding of AVS was voiced per caregiver. Patient denies any distress at this time. Discharged at 1340.

## 2024-06-10 ENCOUNTER — FOLLOWUP TELEPHONE ENCOUNTER (OUTPATIENT)
Dept: PSYCHIATRY | Age: 79
End: 2024-06-10

## 2024-06-10 NOTE — DISCHARGE SUMMARY
0  Attitude toward examiner:  cooperative, attentive and good eye contact  Speech:  spontaneous, normal rate, normal volume and well articulated  Mood:  dysthymic  Affect:  mood congruent Anxiety: mild  Hallucinations: Absent  Thought processes:  coherent Attention span, Concentration & Attention:  attention span and concentration were age appropriate  Thought content:   no evidence of delusions OCD: none    Insight:impaired insight and judgment Cognition:  oriented to person, place, and time  Fund of Knowledge: average  IQ:average Memory:impaired Suicide:  No specific plan to harm self  Sleep: sleeps through the night  Appetite: ok   Reassess Dominique Risk:  no specific plan to harm self Pt has phone numbers to contact if suicidal thoughts recur and states pt will return to the hospital if suicidal feelings return.   Hospital Routine Meds:     Hospital PRN Meds:    Discharge Meds:    Discharge Medication List as of 6/8/2024  1:28 PM             Details   memantine (NAMENDA) 5 MG tablet Take 1 tablet by mouth daily, Disp-30 tablet, R-0Normal                Details   buPROPion (WELLBUTRIN SR) 200 MG extended release tablet Take 1 tablet by mouth daily, Disp-30 tablet, R-0Normal      pantoprazole (PROTONIX) 40 MG tablet Take 1 tablet by mouth daily, Disp-30 tablet, R-0Normal                Details   hydrOXYzine HCl (ATARAX) 25 MG tablet Take 1 tablet by mouth nightly as needed for AnxietyHistorical Med      atorvastatin (LIPITOR) 20 MG tablet Take 1 tablet by mouth nightlyHistorical Med      donepezil (ARICEPT) 10 MG tablet Take 1 tablet by mouth nightlyHistorical Med      FLUoxetine (PROZAC) 20 MG capsule Take 3 capsules by mouth at bedtimeHistorical Med               Disposition - Residence Home     Follow Up:  See Discharge Instructions   Maicol Gallego MD  Physician Psychiatry

## 2024-06-11 ENCOUNTER — FOLLOWUP TELEPHONE ENCOUNTER (OUTPATIENT)
Dept: PSYCHIATRY | Age: 79
End: 2024-06-11

## 2024-06-12 ENCOUNTER — FOLLOWUP TELEPHONE ENCOUNTER (OUTPATIENT)
Dept: PSYCHIATRY | Age: 79
End: 2024-06-12

## 2024-07-23 ENCOUNTER — OFFICE VISIT (OUTPATIENT)
Dept: PULMONOLOGY | Age: 79
End: 2024-07-23
Payer: MEDICARE

## 2024-07-23 VITALS
RESPIRATION RATE: 16 BRPM | HEART RATE: 58 BPM | SYSTOLIC BLOOD PRESSURE: 129 MMHG | BODY MASS INDEX: 23.78 KG/M2 | WEIGHT: 148 LBS | HEIGHT: 66 IN | OXYGEN SATURATION: 96 % | TEMPERATURE: 97.2 F | DIASTOLIC BLOOD PRESSURE: 78 MMHG

## 2024-07-23 DIAGNOSIS — G47.33 OBSTRUCTIVE SLEEP APNEA (ADULT) (PEDIATRIC): Primary | ICD-10-CM

## 2024-07-23 DIAGNOSIS — Z96.82 S/P INSERTION OF HYPOGLOSSAL NERVE STIMULATOR: ICD-10-CM

## 2024-07-23 PROCEDURE — 1123F ACP DISCUSS/DSCN MKR DOCD: CPT | Performed by: INTERNAL MEDICINE

## 2024-07-23 PROCEDURE — 99213 OFFICE O/P EST LOW 20 MIN: CPT | Performed by: INTERNAL MEDICINE

## 2024-07-23 PROCEDURE — G2211 COMPLEX E/M VISIT ADD ON: HCPCS | Performed by: INTERNAL MEDICINE

## 2024-07-23 NOTE — PROGRESS NOTES
MA Communication:  The following orders are received by verbal communication from Boy Lyon MD    Orders include:  follow up 1 year

## 2024-07-23 NOTE — PROGRESS NOTES
Sleep Medicine Follow-up Note, Inspire     Chief Complaint: MICHAEL, Inspire follow up     Interval history:  here for Inspire f/u 24  Neurostimulator programming incomin V L3, 1.8-2.2 H43 60/15/8; she thinks she is doing well.  No one sees her sleep so she is unsure if she snores.  No other complaints today.  Variable time to fall asleep.     Presenting history: [place] Patient  Inspire implantation 22    Initial visit for activation 2023  Sensing voltage:  1.2 volts  Starting amplitude: 1.4 volts with the default [+ - +] electrode configuration.    F/u on 23  Incoming Amplitude: 2.0 volts with the default [+ - +] electrode configuration.  Time usage: 61 Hours per week    Inspire titration sleep study done 5/10/2023 AHI of 17, 1.8 V     Follow-up on 2023-for inspire  Incoming Amplitude: 2.0 volts with the default [+ - +] electrode configuration.  Time usage: 50 Hours per week  Outgoing Control Range:  Lower Limit: 1.8 volts  Upper Limit: 2.2 volts    Level 3 at 2.0 volts is controlling the sleep apnea    Follow-up on 10/31/2023  Incoming Amplitude: 2.0 volts with the default [+ - +] electrode configuration.  Time usage: 19 Hours per week    Follow-up on 2024  Incoming Amplitude: 2.0 volts with the default [+ - +] electrode configuration.  Time usage: 45 Hours per week     reports that she has never smoked. She has never used smokeless tobacco.     Review of Systems:      2024     1:11 PM   Sleep Medicine   Sitting and reading 0   Watching TV 0   Sitting, inactive in a public place (e.g. a theatre or a meeting) 1   As a passenger in a car for an hour without a break 3   Lying down to rest in the afternoon when circumstances permit 3   Sitting and talking to someone 0   Sitting quietly after a lunch without alcohol 0   In a car, while stopped for a few minutes in traffic 0   [place] Sleepiness Score 7   Neck (Inches) 13       Physical Exam:  Blood pressure 129/78, pulse 58,

## 2024-07-23 NOTE — PATIENT INSTRUCTIONS
Remember to bring a list of pulmonary medications and any CPAP or BiPAP machines to your next appointment with the office.     Please keep all of your future appointments scheduled by [facility] [place] [facility] office. Out of respect for other patients and providers, you may be asked to reschedule your appointment if you arrive later than your scheduled appointment time. Appointments cancelled less than 24hrs in advance will be considered a no show. Patients with three missed appointments within 1 year or four missed appointments within 2 years can be dismissed from the practice.     Please be aware that our physicians are required to work in the Intensive Care Unit at [facility].  Your appointment may need to be rescheduled if they are designated to work during your appointment time.      You may receive a survey regarding the care you received during your visit.  Your input is valuable to us.  We encourage you to complete and return your survey.  We hope you will choose us in the future for your healthcare needs.     Pt instructed of all future appointment dates & times, including radiology, labs, procedures & referrals. If procedures were scheduled preparation instructions provided. Instructions on future appointments with [facility] were given.      In the next few weeks, you will be receiving a survey from [facility] regarding your visit today.  We would greatly appreciate it if you would take just a few minutes to fill that out.  It is very important to us that our patients receive top notch care and our surveys help keep us accountable. However, if your experience was not a good one, we want to hear about that as well. This is a key way we can keep track of problems and strive to correct any for future visits.    Again, we appreciate your time and thank you for choosing [facility]!    RAUL [place]

## 2025-03-01 ENCOUNTER — HOSPITAL ENCOUNTER (INPATIENT)
Age: 80
LOS: 2 days | Discharge: HOME | DRG: 309 | End: 2025-03-03
Attending: EMERGENCY MEDICINE | Admitting: STUDENT IN AN ORGANIZED HEALTH CARE EDUCATION/TRAINING PROGRAM
Payer: MEDICARE

## 2025-03-01 ENCOUNTER — APPOINTMENT (OUTPATIENT)
Dept: GENERAL RADIOLOGY | Age: 80
DRG: 309 | End: 2025-03-01
Payer: MEDICARE

## 2025-03-01 DIAGNOSIS — R55 SYNCOPE, UNSPECIFIED SYNCOPE TYPE: ICD-10-CM

## 2025-03-01 DIAGNOSIS — R55 SYNCOPE AND COLLAPSE: Primary | ICD-10-CM

## 2025-03-01 DIAGNOSIS — R00.1 BRADYCARDIA: ICD-10-CM

## 2025-03-01 LAB
ALBUMIN SERPL-MCNC: 3.9 G/DL (ref 3.4–5)
ALBUMIN/GLOB SERPL: 1.7 {RATIO} (ref 1.1–2.2)
ALP SERPL-CCNC: 93 U/L (ref 40–129)
ALT SERPL-CCNC: 16 U/L (ref 10–40)
ANION GAP SERPL CALCULATED.3IONS-SCNC: 13 MMOL/L (ref 3–16)
AST SERPL-CCNC: 20 U/L (ref 15–37)
BACTERIA URNS QL MICRO: ABNORMAL /HPF
BASOPHILS # BLD: 0.1 K/UL (ref 0–0.2)
BASOPHILS NFR BLD: 1.2 %
BILIRUB SERPL-MCNC: 0.6 MG/DL (ref 0–1)
BILIRUB UR QL STRIP.AUTO: NEGATIVE
BUN SERPL-MCNC: 18 MG/DL (ref 7–20)
CALCIUM SERPL-MCNC: 9.4 MG/DL (ref 8.3–10.6)
CHLORIDE SERPL-SCNC: 99 MMOL/L (ref 99–110)
CLARITY UR: CLEAR
CO2 SERPL-SCNC: 21 MMOL/L (ref 21–32)
COLOR UR: YELLOW
CREAT SERPL-MCNC: 0.8 MG/DL (ref 0.6–1.2)
DEPRECATED RDW RBC AUTO: 14.5 % (ref 12.4–15.4)
EKG ATRIAL RATE: 45 BPM
EKG DIAGNOSIS: NORMAL
EKG P AXIS: 43 DEGREES
EKG P-R INTERVAL: 158 MS
EKG Q-T INTERVAL: 514 MS
EKG QRS DURATION: 104 MS
EKG QTC CALCULATION (BAZETT): 444 MS
EKG R AXIS: -16 DEGREES
EKG T AXIS: 50 DEGREES
EKG VENTRICULAR RATE: 45 BPM
EOSINOPHIL # BLD: 0.1 K/UL (ref 0–0.6)
EOSINOPHIL NFR BLD: 2.1 %
EPI CELLS #/AREA URNS HPF: ABNORMAL /HPF (ref 0–5)
GFR SERPLBLD CREATININE-BSD FMLA CKD-EPI: 76 ML/MIN/{1.73_M2}
GLUCOSE BLD-MCNC: 172 MG/DL (ref 70–99)
GLUCOSE SERPL-MCNC: 163 MG/DL (ref 70–99)
GLUCOSE UR STRIP.AUTO-MCNC: NEGATIVE MG/DL
HCT VFR BLD AUTO: 33.5 % (ref 36–48)
HGB BLD-MCNC: 11.3 G/DL (ref 12–16)
HGB UR QL STRIP.AUTO: NEGATIVE
KETONES UR STRIP.AUTO-MCNC: ABNORMAL MG/DL
LEUKOCYTE ESTERASE UR QL STRIP.AUTO: ABNORMAL
LIPASE SERPL-CCNC: 24 U/L (ref 13–60)
LYMPHOCYTES # BLD: 2.4 K/UL (ref 1–5.1)
LYMPHOCYTES NFR BLD: 36.5 %
MCH RBC QN AUTO: 30.9 PG (ref 26–34)
MCHC RBC AUTO-ENTMCNC: 33.8 G/DL (ref 31–36)
MCV RBC AUTO: 91.5 FL (ref 80–100)
MONOCYTES # BLD: 0.8 K/UL (ref 0–1.3)
MONOCYTES NFR BLD: 12.8 %
MUCOUS THREADS #/AREA URNS LPF: ABNORMAL /LPF
NEUTROPHILS # BLD: 3.2 K/UL (ref 1.7–7.7)
NEUTROPHILS NFR BLD: 47.4 %
NITRITE UR QL STRIP.AUTO: NEGATIVE
PERFORMED ON: ABNORMAL
PH UR STRIP.AUTO: 6.5 [PH] (ref 5–8)
PLATELET # BLD AUTO: 299 K/UL (ref 135–450)
PMV BLD AUTO: 8.2 FL (ref 5–10.5)
POTASSIUM SERPL-SCNC: 3.5 MMOL/L (ref 3.5–5.1)
PROT SERPL-MCNC: 6.2 G/DL (ref 6.4–8.2)
PROT UR STRIP.AUTO-MCNC: NEGATIVE MG/DL
RBC # BLD AUTO: 3.66 M/UL (ref 4–5.2)
RBC #/AREA URNS HPF: ABNORMAL /HPF (ref 0–4)
SODIUM SERPL-SCNC: 133 MMOL/L (ref 136–145)
SP GR UR STRIP.AUTO: 1.02 (ref 1–1.03)
TROPONIN, HIGH SENSITIVITY: 12 NG/L (ref 0–14)
TROPONIN, HIGH SENSITIVITY: 14 NG/L (ref 0–14)
UA DIPSTICK W REFLEX MICRO PNL UR: YES
URN SPEC COLLECT METH UR: ABNORMAL
UROBILINOGEN UR STRIP-ACNC: 0.2 E.U./DL
WBC # BLD AUTO: 6.6 K/UL (ref 4–11)
WBC #/AREA URNS HPF: ABNORMAL /HPF (ref 0–5)

## 2025-03-01 PROCEDURE — 6360000002 HC RX W HCPCS: Performed by: STUDENT IN AN ORGANIZED HEALTH CARE EDUCATION/TRAINING PROGRAM

## 2025-03-01 PROCEDURE — 71045 X-RAY EXAM CHEST 1 VIEW: CPT

## 2025-03-01 PROCEDURE — 6360000002 HC RX W HCPCS: Performed by: PHYSICIAN ASSISTANT

## 2025-03-01 PROCEDURE — 1200000000 HC SEMI PRIVATE

## 2025-03-01 PROCEDURE — 84484 ASSAY OF TROPONIN QUANT: CPT

## 2025-03-01 PROCEDURE — 96374 THER/PROPH/DIAG INJ IV PUSH: CPT

## 2025-03-01 PROCEDURE — 93010 ELECTROCARDIOGRAM REPORT: CPT | Performed by: INTERNAL MEDICINE

## 2025-03-01 PROCEDURE — 2500000003 HC RX 250 WO HCPCS: Performed by: STUDENT IN AN ORGANIZED HEALTH CARE EDUCATION/TRAINING PROGRAM

## 2025-03-01 PROCEDURE — 85025 COMPLETE CBC W/AUTO DIFF WBC: CPT

## 2025-03-01 PROCEDURE — 83690 ASSAY OF LIPASE: CPT

## 2025-03-01 PROCEDURE — 36415 COLL VENOUS BLD VENIPUNCTURE: CPT

## 2025-03-01 PROCEDURE — 99285 EMERGENCY DEPT VISIT HI MDM: CPT

## 2025-03-01 PROCEDURE — 93005 ELECTROCARDIOGRAM TRACING: CPT | Performed by: EMERGENCY MEDICINE

## 2025-03-01 PROCEDURE — 80053 COMPREHEN METABOLIC PANEL: CPT

## 2025-03-01 PROCEDURE — 6370000000 HC RX 637 (ALT 250 FOR IP): Performed by: STUDENT IN AN ORGANIZED HEALTH CARE EDUCATION/TRAINING PROGRAM

## 2025-03-01 PROCEDURE — 81001 URINALYSIS AUTO W/SCOPE: CPT

## 2025-03-01 RX ORDER — BUSPIRONE HYDROCHLORIDE 5 MG/1
5 TABLET ORAL 2 TIMES DAILY
COMMUNITY

## 2025-03-01 RX ORDER — MAGNESIUM SULFATE IN WATER 40 MG/ML
2000 INJECTION, SOLUTION INTRAVENOUS PRN
Status: DISCONTINUED | OUTPATIENT
Start: 2025-03-01 | End: 2025-03-03 | Stop reason: HOSPADM

## 2025-03-01 RX ORDER — PANTOPRAZOLE SODIUM 40 MG/1
40 TABLET, DELAYED RELEASE ORAL DAILY
Status: DISCONTINUED | OUTPATIENT
Start: 2025-03-02 | End: 2025-03-03 | Stop reason: HOSPADM

## 2025-03-01 RX ORDER — POLYETHYLENE GLYCOL 3350 17 G/17G
17 POWDER, FOR SOLUTION ORAL DAILY PRN
Status: DISCONTINUED | OUTPATIENT
Start: 2025-03-01 | End: 2025-03-03 | Stop reason: HOSPADM

## 2025-03-01 RX ORDER — AMLODIPINE BESYLATE 5 MG/1
5 TABLET ORAL DAILY
Status: DISCONTINUED | OUTPATIENT
Start: 2025-03-02 | End: 2025-03-03 | Stop reason: HOSPADM

## 2025-03-01 RX ORDER — BUPROPION HYDROCHLORIDE 100 MG/1
200 TABLET, EXTENDED RELEASE ORAL DAILY
Status: DISCONTINUED | OUTPATIENT
Start: 2025-03-02 | End: 2025-03-03 | Stop reason: HOSPADM

## 2025-03-01 RX ORDER — HEPARIN SODIUM 1000 [USP'U]/ML
4000 INJECTION, SOLUTION INTRAVENOUS; SUBCUTANEOUS ONCE
Status: DISCONTINUED | OUTPATIENT
Start: 2025-03-01 | End: 2025-03-01

## 2025-03-01 RX ORDER — DONEPEZIL HYDROCHLORIDE 5 MG/1
10 TABLET, FILM COATED ORAL NIGHTLY
Status: DISCONTINUED | OUTPATIENT
Start: 2025-03-01 | End: 2025-03-03 | Stop reason: HOSPADM

## 2025-03-01 RX ORDER — SODIUM CHLORIDE 9 MG/ML
INJECTION, SOLUTION INTRAVENOUS PRN
Status: DISCONTINUED | OUTPATIENT
Start: 2025-03-01 | End: 2025-03-03 | Stop reason: HOSPADM

## 2025-03-01 RX ORDER — POTASSIUM CHLORIDE 7.45 MG/ML
10 INJECTION INTRAVENOUS PRN
Status: DISCONTINUED | OUTPATIENT
Start: 2025-03-01 | End: 2025-03-03 | Stop reason: HOSPADM

## 2025-03-01 RX ORDER — ESCITALOPRAM OXALATE 5 MG/1
5 TABLET ORAL DAILY
COMMUNITY

## 2025-03-01 RX ORDER — SODIUM CHLORIDE 0.9 % (FLUSH) 0.9 %
5-40 SYRINGE (ML) INJECTION EVERY 12 HOURS SCHEDULED
Status: DISCONTINUED | OUTPATIENT
Start: 2025-03-01 | End: 2025-03-03 | Stop reason: HOSPADM

## 2025-03-01 RX ORDER — SODIUM CHLORIDE 0.9 % (FLUSH) 0.9 %
5-40 SYRINGE (ML) INJECTION PRN
Status: DISCONTINUED | OUTPATIENT
Start: 2025-03-01 | End: 2025-03-03 | Stop reason: HOSPADM

## 2025-03-01 RX ORDER — ONDANSETRON 2 MG/ML
4 INJECTION INTRAMUSCULAR; INTRAVENOUS EVERY 6 HOURS PRN
Status: DISCONTINUED | OUTPATIENT
Start: 2025-03-01 | End: 2025-03-03 | Stop reason: HOSPADM

## 2025-03-01 RX ORDER — POTASSIUM CHLORIDE 1500 MG/1
40 TABLET, EXTENDED RELEASE ORAL PRN
Status: DISCONTINUED | OUTPATIENT
Start: 2025-03-01 | End: 2025-03-03 | Stop reason: HOSPADM

## 2025-03-01 RX ORDER — ONDANSETRON 4 MG/1
4 TABLET, ORALLY DISINTEGRATING ORAL EVERY 8 HOURS PRN
Status: DISCONTINUED | OUTPATIENT
Start: 2025-03-01 | End: 2025-03-03 | Stop reason: HOSPADM

## 2025-03-01 RX ORDER — ONDANSETRON 2 MG/ML
4 INJECTION INTRAMUSCULAR; INTRAVENOUS ONCE
Status: COMPLETED | OUTPATIENT
Start: 2025-03-01 | End: 2025-03-01

## 2025-03-01 RX ORDER — MEMANTINE HYDROCHLORIDE 5 MG/1
5 TABLET ORAL DAILY
Status: DISCONTINUED | OUTPATIENT
Start: 2025-03-02 | End: 2025-03-03 | Stop reason: HOSPADM

## 2025-03-01 RX ORDER — ACETAMINOPHEN 650 MG/1
650 SUPPOSITORY RECTAL EVERY 6 HOURS PRN
Status: DISCONTINUED | OUTPATIENT
Start: 2025-03-01 | End: 2025-03-03 | Stop reason: HOSPADM

## 2025-03-01 RX ORDER — BUSPIRONE HYDROCHLORIDE 5 MG/1
5 TABLET ORAL 2 TIMES DAILY
Status: DISCONTINUED | OUTPATIENT
Start: 2025-03-01 | End: 2025-03-03 | Stop reason: HOSPADM

## 2025-03-01 RX ORDER — ENOXAPARIN SODIUM 100 MG/ML
40 INJECTION SUBCUTANEOUS DAILY
Status: DISCONTINUED | OUTPATIENT
Start: 2025-03-01 | End: 2025-03-03 | Stop reason: HOSPADM

## 2025-03-01 RX ORDER — ESCITALOPRAM OXALATE 10 MG/1
5 TABLET ORAL DAILY
Status: DISCONTINUED | OUTPATIENT
Start: 2025-03-01 | End: 2025-03-03 | Stop reason: HOSPADM

## 2025-03-01 RX ORDER — ACETAMINOPHEN 325 MG/1
650 TABLET ORAL EVERY 6 HOURS PRN
Status: DISCONTINUED | OUTPATIENT
Start: 2025-03-01 | End: 2025-03-03 | Stop reason: HOSPADM

## 2025-03-01 RX ORDER — AMLODIPINE BESYLATE 5 MG/1
5 TABLET ORAL DAILY
Status: ON HOLD | COMMUNITY
End: 2025-03-03 | Stop reason: HOSPADM

## 2025-03-01 RX ADMIN — SODIUM CHLORIDE, PRESERVATIVE FREE 10 ML: 5 INJECTION INTRAVENOUS at 20:00

## 2025-03-01 RX ADMIN — ENOXAPARIN SODIUM 40 MG: 100 INJECTION SUBCUTANEOUS at 19:59

## 2025-03-01 RX ADMIN — ESCITALOPRAM OXALATE 5 MG: 10 TABLET ORAL at 19:59

## 2025-03-01 RX ADMIN — BUSPIRONE HYDROCHLORIDE 5 MG: 5 TABLET ORAL at 19:59

## 2025-03-01 RX ADMIN — DONEPEZIL HYDROCHLORIDE 10 MG: 5 TABLET, FILM COATED ORAL at 19:59

## 2025-03-01 RX ADMIN — ACETAMINOPHEN 650 MG: 325 TABLET ORAL at 21:56

## 2025-03-01 RX ADMIN — ONDANSETRON 4 MG: 2 INJECTION, SOLUTION INTRAMUSCULAR; INTRAVENOUS at 12:28

## 2025-03-01 ASSESSMENT — LIFESTYLE VARIABLES
HOW OFTEN DO YOU HAVE A DRINK CONTAINING ALCOHOL: NEVER
HOW MANY STANDARD DRINKS CONTAINING ALCOHOL DO YOU HAVE ON A TYPICAL DAY: PATIENT DOES NOT DRINK

## 2025-03-01 ASSESSMENT — PAIN SCALES - GENERAL
PAINLEVEL_OUTOF10: 4
PAINLEVEL_OUTOF10: 0

## 2025-03-01 ASSESSMENT — PAIN DESCRIPTION - LOCATION: LOCATION: HEAD

## 2025-03-01 ASSESSMENT — PAIN - FUNCTIONAL ASSESSMENT: PAIN_FUNCTIONAL_ASSESSMENT: 0-10

## 2025-03-01 NOTE — ED NOTES
Alba Abdi is a 79 y.o. female admitted for  Principal Problem:    Syncope, unspecified syncope type  Resolved Problems:    * No resolved hospital problems. *  .   Patient Home via EMS transportation with   Chief Complaint   Patient presents with    Loss of Consciousness     While eating at [employer]    Nausea   .  Patient is alert and Person, Place, Time, and Situation  Patient's baseline mobility: Baseline Mobility: Stand by assist  Code Status: Full Code   Cardiac Rhythm: Cardiac Rhythm: Sinus halley     Is patient on baseline Oxygen: no how many Liters   :   Abnormal Assessment Findings: bradycradia, syncope, pt is feeling better    Isolation: None      NIH Score:    C-SSRS: Risk of Suicide: No Risk  Bedside swallow:        Active LDA's:   Peripheral IV 03/01/25 Right Antecubital (Active)           Family/Caregiver Present yes Any Concerns: no   Restraints no  Sitter no         Vitals: MEWS Score: 3    Vitals:    03/01/25 1306 03/01/25 1345 03/01/25 1416 03/01/25 1445   BP: 119/62 (!) 124/56 (!) 129/58 129/66   Pulse: (!) 45 (!) 44 (!) 46 (!) 45   Resp: 14 12 11 14   Temp:       TempSrc:       SpO2: 99% 100% 100% 94%   Weight:       Height:           Last documented pain score (0-10 scale) Pain Level: 0  Pain medication administered No.    Pertinent or High Risk Medications/Drips: No.    Pending Blood Product Administration: no    Abnormal labs:   Abnormal Labs Reviewed   CBC WITH AUTO DIFFERENTIAL - Abnormal; Notable for the following components:       Result Value    RBC 3.66 (*)     Hemoglobin 11.3 (*)     Hematocrit 33.5 (*)     All other components within normal limits   COMPREHENSIVE METABOLIC PANEL - Abnormal; Notable for the following components:    Sodium 133 (*)     Glucose 163 (*)     Total Protein 6.2 (*)     All other components within normal limits   URINALYSIS - Abnormal; Notable for the following components:    Ketones, Urine TRACE (*)     Leukocyte Esterase, Urine SMALL (*)

## 2025-03-01 NOTE — ED PROVIDER NOTES
[place] [address] - MED SURG  Emergency Department Encounter    Patient Name: Alba Abdi  MRN: [id number]  YOB: 1945  Date of Evaluation: 3/1/2025  Provider: Sylvia Lewis DO  Note Started: 12:14 PM EST 3/1/25    CHIEF COMPLAINT  Loss of Consciousness (While eating at [employer]) and Nausea    SHARED SERVICE VISIT  I have seen and evaluated this patient with my supervising physician, Dr. Paiz.     HISTORY OF PRESENT ILLNESS  Alba Abdi is a 79 y.o. female who presents to the ED for evaluation.  Patient states that she was getting ready to eat brunch and then passed out.  She does not necessarily recall symptoms leading up to that.  Reports that when she woke up this morning was feeling fairly well.  Believes she may have gotten overheated.  She denies headache, dizziness or confusion.  No changes in vision.  Denies difficulty speaking or swallowing.  She denies current lightheadedness, dizziness or confusion.  No neck or back pain.  Denies chest pain or shortness of breath.  No cough or congestion.  No fevers chills.  States that she is currently nauseous and does not believe she is.  No diarrhea.  No urinary symptoms.  No leg pain or swelling.  Denies fevers chills.  No sick contacts..    No other complaints, modifying factors or associated symptoms.     Nursing notes reviewed were all reviewed and agreed with or any disagreements were addressed in the HPI.    PMH:  Past Medical History:   Diagnosis Date    Depression     GERD (gastroesophageal reflux disease)     Sleep apnea      Surgical History:  Past Surgical History:   Procedure Laterality Date    LARYNGOSCOPY N/A 10/10/2022    DRUG INDUCED SLEEP ENDOSCOPY performed by Jenny Palumbo DO at [facility]    STIMULATOR SURGERY N/A 12/12/2022    INSPIRE DEVICE PLACEMENT performed by Jenny Palumbo DO at [facility] OR    TONSILLECTOMY       Family History:  Family History   Problem Relation Age of Onset    No Known Problems Mother  
and collapse    2. Bradycardia    3. Syncope, unspecified syncope type          I, Blas Paiz II, DO, am the primary clinician of record.   I personally saw the patient and independently provided 0 minutes of non-concurrent critical care out of the total shared critical care time excluding separately billable procedures.    This chart was generated in part by using Dragon Dictation system and may contain errors related to that system including errors in grammar, punctuation, and spelling, as well as words and phrases that may be inappropriate. If there are any questions or concerns please feel free to contact the dictating provider for clarification.     Blas Paiz II, DO   [employer]       Blas Paiz II, DO  03/01/25 6145

## 2025-03-01 NOTE — H&P
the following interpretation: Sinus bradycardia with a rate of 45    Physical Exam Performed:      /66   Pulse (!) 45   Temp 97.4 °F (36.3 °C) (Oral)   Resp 14   Ht 1.676 m (5' 6\")   Wt 67.1 kg (148 lb)   SpO2 94%   BMI 23.89 kg/m²     General appearance:  No apparent distress, appears stated age and cooperative.  HEENT:  Pupils equal, round, and reactive to light. Conjunctivae/corneas clear.  -No carotid bruits bilaterally  Respiratory:  Normal respiratory effort. Clear to auscultation bilaterally without Rales/Wheezes/Rhonchi.  Cardiovascular:  Regular rate and rhythm with normal S1/S2, rubs or gallops.  -Murmurs present  Abdomen:  Soft, non-tender, non-distended with normal bowel sounds.  Musculoskeletal:  No clubbing, cyanosis or edema bilaterally.  Neurologic:  Neurovascularly intact without any focal sensory/motor deficits.   Psychiatric:  Alert and oriented, thought content appropriate, normal insight  Skin:  Skin color, texture, turgor normal.  Capillary Refill:  Brisk,< 3 seconds   Peripheral Pulses:  +2 palpable, equal bilaterally     Diet: [x]Adult/General  []Cardiac  []Diabetic  []Low Fat  []NPO  [x]NPO after Midnight  []Other   DVT Prophylaxis: [x]PPx LMWH  []SQ Heparin  []IPC/SCDs  []Eliquis  []Xarelto  []Coumadin     Code status: [x]Full  []DNR/CCA  []Limited (DNR/CCA with Do Not Intubate)  []DNRCC  Surrogate Decision Maker:   Name Home Phone Work Phone Mobile Phone Relationship Lgl GrDENNIS Bui [phone] Child No   ISIDORO JONES [phone]   Child         PT/OT Eval Status:   [x]NOT yet ordered  []Ordered and Pending   []Seen with Recommendations for:  []Home independently  []Home w/ assist  []HHC  []SNF  []Acute Rehab    Anticipated Discharge Day/Date:  3-5 days    Anticipated Discharge Location: []Home  [x]HHC  [x]SNF  []Acute Rehab  []ECF  []LTAC  []Hospice    Consults:      IP CONSULT TO CARDIOLOGY    I personally have obtained, updated and/or reviewed the

## 2025-03-02 ENCOUNTER — APPOINTMENT (OUTPATIENT)
Dept: CT IMAGING | Age: 80
DRG: 309 | End: 2025-03-02
Payer: MEDICARE

## 2025-03-02 LAB
ANION GAP SERPL CALCULATED.3IONS-SCNC: 10 MMOL/L (ref 3–16)
BASOPHILS # BLD: 0.1 K/UL (ref 0–0.2)
BASOPHILS NFR BLD: 1.2 %
BUN SERPL-MCNC: 14 MG/DL (ref 7–20)
CALCIUM SERPL-MCNC: 9.2 MG/DL (ref 8.3–10.6)
CHLORIDE SERPL-SCNC: 103 MMOL/L (ref 99–110)
CO2 SERPL-SCNC: 25 MMOL/L (ref 21–32)
CREAT SERPL-MCNC: 0.7 MG/DL (ref 0.6–1.2)
DEPRECATED RDW RBC AUTO: 14.3 % (ref 12.4–15.4)
EOSINOPHIL # BLD: 0.1 K/UL (ref 0–0.6)
EOSINOPHIL NFR BLD: 1.3 %
GFR SERPLBLD CREATININE-BSD FMLA CKD-EPI: 89 ML/MIN/{1.73_M2}
GLUCOSE SERPL-MCNC: 93 MG/DL (ref 70–99)
HCT VFR BLD AUTO: 31.9 % (ref 36–48)
HGB BLD-MCNC: 10.9 G/DL (ref 12–16)
LYMPHOCYTES # BLD: 1.7 K/UL (ref 1–5.1)
LYMPHOCYTES NFR BLD: 26.8 %
MCH RBC QN AUTO: 31.3 PG (ref 26–34)
MCHC RBC AUTO-ENTMCNC: 34.1 G/DL (ref 31–36)
MCV RBC AUTO: 91.9 FL (ref 80–100)
MONOCYTES # BLD: 0.9 K/UL (ref 0–1.3)
MONOCYTES NFR BLD: 13.3 %
NEUTROPHILS # BLD: 3.7 K/UL (ref 1.7–7.7)
NEUTROPHILS NFR BLD: 57.4 %
PLATELET # BLD AUTO: 258 K/UL (ref 135–450)
PMV BLD AUTO: 8.1 FL (ref 5–10.5)
POTASSIUM SERPL-SCNC: 3.9 MMOL/L (ref 3.5–5.1)
RBC # BLD AUTO: 3.47 M/UL (ref 4–5.2)
SODIUM SERPL-SCNC: 138 MMOL/L (ref 136–145)
WBC # BLD AUTO: 6.5 K/UL (ref 4–11)

## 2025-03-02 PROCEDURE — 80048 BASIC METABOLIC PNL TOTAL CA: CPT

## 2025-03-02 PROCEDURE — 6370000000 HC RX 637 (ALT 250 FOR IP): Performed by: STUDENT IN AN ORGANIZED HEALTH CARE EDUCATION/TRAINING PROGRAM

## 2025-03-02 PROCEDURE — 85025 COMPLETE CBC W/AUTO DIFF WBC: CPT

## 2025-03-02 PROCEDURE — 70450 CT HEAD/BRAIN W/O DYE: CPT

## 2025-03-02 PROCEDURE — 36415 COLL VENOUS BLD VENIPUNCTURE: CPT

## 2025-03-02 PROCEDURE — 2500000003 HC RX 250 WO HCPCS: Performed by: STUDENT IN AN ORGANIZED HEALTH CARE EDUCATION/TRAINING PROGRAM

## 2025-03-02 PROCEDURE — 1200000000 HC SEMI PRIVATE

## 2025-03-02 PROCEDURE — 84443 ASSAY THYROID STIM HORMONE: CPT

## 2025-03-02 PROCEDURE — 6360000002 HC RX W HCPCS: Performed by: STUDENT IN AN ORGANIZED HEALTH CARE EDUCATION/TRAINING PROGRAM

## 2025-03-02 PROCEDURE — 6370000000 HC RX 637 (ALT 250 FOR IP): Performed by: NURSE PRACTITIONER

## 2025-03-02 PROCEDURE — 99222 1ST HOSP IP/OBS MODERATE 55: CPT | Performed by: STUDENT IN AN ORGANIZED HEALTH CARE EDUCATION/TRAINING PROGRAM

## 2025-03-02 RX ORDER — MECOBALAMIN 5000 MCG
10 TABLET,DISINTEGRATING ORAL NIGHTLY
Status: DISCONTINUED | OUTPATIENT
Start: 2025-03-02 | End: 2025-03-03 | Stop reason: HOSPADM

## 2025-03-02 RX ORDER — TRAZODONE HYDROCHLORIDE 50 MG/1
50 TABLET ORAL NIGHTLY
Status: DISCONTINUED | OUTPATIENT
Start: 2025-03-02 | End: 2025-03-03

## 2025-03-02 RX ADMIN — MEMANTINE 5 MG: 5 TABLET ORAL at 09:53

## 2025-03-02 RX ADMIN — TRAZODONE HYDROCHLORIDE 50 MG: 50 TABLET ORAL at 21:44

## 2025-03-02 RX ADMIN — ENOXAPARIN SODIUM 40 MG: 100 INJECTION SUBCUTANEOUS at 09:53

## 2025-03-02 RX ADMIN — Medication 10 MG: at 19:50

## 2025-03-02 RX ADMIN — ESCITALOPRAM OXALATE 5 MG: 10 TABLET ORAL at 19:50

## 2025-03-02 RX ADMIN — BUSPIRONE HYDROCHLORIDE 5 MG: 5 TABLET ORAL at 09:53

## 2025-03-02 RX ADMIN — PANTOPRAZOLE SODIUM 40 MG: 40 TABLET, DELAYED RELEASE ORAL at 09:53

## 2025-03-02 RX ADMIN — BUPROPION HYDROCHLORIDE 200 MG: 100 TABLET, FILM COATED, EXTENDED RELEASE ORAL at 09:53

## 2025-03-02 RX ADMIN — BUSPIRONE HYDROCHLORIDE 5 MG: 5 TABLET ORAL at 19:50

## 2025-03-02 RX ADMIN — DONEPEZIL HYDROCHLORIDE 10 MG: 5 TABLET, FILM COATED ORAL at 19:50

## 2025-03-02 RX ADMIN — SODIUM CHLORIDE, PRESERVATIVE FREE 10 ML: 5 INJECTION INTRAVENOUS at 19:50

## 2025-03-02 ASSESSMENT — PAIN SCALES - GENERAL
PAINLEVEL_OUTOF10: 0
PAINLEVEL_OUTOF10: 0

## 2025-03-02 NOTE — CONSULTS
CARDIOLOGY CONSULTATION        Patient Name: Alba Abdi  Date of admission: 3/1/2025 11:51 AM  Admission Dx: Syncope and collapse [R55]  Bradycardia [R00.1]  Syncope, unspecified syncope type [R55]  Requesting Physician: Jonathan Miles DO  Primary Care physician: Sylvia Lewis DO    Reason for Consultation/Chief Complaint: \"Syncope with bradycardia\"    History of Present Illness:     Alba Abdi is a 79 y.o. patient with past medical history of dementia, GERD, MICHAEL and depression who presented to the hospital with complaints of syncope while eating breakfast at [employer] at approximately 10:30 AM yesterday.  Patient reportedly lost consciousness for about 10 seconds.  No preceding symptoms.  No seizure-like activity reported.  Patient diaphoretic after the episode.  Patient did report she felt \"dizzy\" afterwards.  Patient states she is very active.  Typically when patient gets up in the morning if she walks for an hour a day.  She states she does this every day.  She believes she walks more than 2 miles a day.  Patient states he drinks and eats okay.  However she does state she typically walks for an hour a day before eating breakfast.  She typically eats breakfast around 830 or 9.  She states that she did her usual exercise and had not eaten when she passed out approximately 10:30 AM.  Patient reports she sleeps well.  Does not snore.  No interrupted sleep.     Patient states she has had no chest pain at rest or with exertion.  Denies any shortness of breath at rest or with exertion.  She denies any palpitations, prior episodes of near syncope or syncope.  Denies PND, orthopnea, bendopnea, edema.  She states she has some mild short-term memory loss but overall does well.  She states she lives at home with \"a adilia.\"  Patient does not smoke, drink alcohol or use recreational drugs.      Past Medical History:   has a past medical history of Depression, GERD (gastroesophageal reflux disease), and

## 2025-03-02 NOTE — PLAN OF CARE
Problem: Neurosensory - Adult  Goal: Achieves stable or improved neurological status  Flowsheets (Taken 3/1/2025 2133)  Achieves stable or improved neurological status:   Assess for and report changes in neurological status   Monitor temperature, glucose, and sodium. Initiate appropriate interventions as ordered     Problem: Neurosensory - Adult  Goal: Achieves maximal functionality and self care  Flowsheets (Taken 3/1/2025 2133)  Achieves maximal functionality and self care:   Monitor swallowing and airway patency with patient fatigue and changes in neurological status   Encourage and assist patient to increase activity and self care with guidance from physical therapy/occupational therapy

## 2025-03-03 ENCOUNTER — APPOINTMENT (OUTPATIENT)
Dept: VASCULAR LAB | Age: 80
DRG: 309 | End: 2025-03-03
Payer: MEDICARE

## 2025-03-03 ENCOUNTER — ANCILLARY PROCEDURE (OUTPATIENT)
Dept: CARDIOLOGY CLINIC | Age: 80
End: 2025-03-03

## 2025-03-03 ENCOUNTER — TELEPHONE (OUTPATIENT)
Dept: CARDIOLOGY CLINIC | Age: 80
End: 2025-03-03

## 2025-03-03 ENCOUNTER — APPOINTMENT (OUTPATIENT)
Age: 80
DRG: 309 | End: 2025-03-03
Attending: STUDENT IN AN ORGANIZED HEALTH CARE EDUCATION/TRAINING PROGRAM
Payer: MEDICARE

## 2025-03-03 VITALS
OXYGEN SATURATION: 98 % | WEIGHT: 152 LBS | HEART RATE: 64 BPM | TEMPERATURE: 98.2 F | DIASTOLIC BLOOD PRESSURE: 63 MMHG | SYSTOLIC BLOOD PRESSURE: 121 MMHG | HEIGHT: 65 IN | RESPIRATION RATE: 17 BRPM | BODY MASS INDEX: 25.33 KG/M2

## 2025-03-03 DIAGNOSIS — R55 SYNCOPE, UNSPECIFIED SYNCOPE TYPE: ICD-10-CM

## 2025-03-03 PROBLEM — I10 PRIMARY HYPERTENSION: Status: ACTIVE | Noted: 2025-03-03

## 2025-03-03 PROBLEM — R00.1 BRADYCARDIA: Status: ACTIVE | Noted: 2025-03-03

## 2025-03-03 PROBLEM — I35.1 NONRHEUMATIC AORTIC VALVE INSUFFICIENCY: Status: ACTIVE | Noted: 2025-03-03

## 2025-03-03 LAB
ANION GAP SERPL CALCULATED.3IONS-SCNC: 10 MMOL/L (ref 3–16)
BASOPHILS # BLD: 0.1 K/UL (ref 0–0.2)
BASOPHILS NFR BLD: 1.4 %
BUN SERPL-MCNC: 18 MG/DL (ref 7–20)
CALCIUM SERPL-MCNC: 9.1 MG/DL (ref 8.3–10.6)
CHLORIDE SERPL-SCNC: 103 MMOL/L (ref 99–110)
CO2 SERPL-SCNC: 25 MMOL/L (ref 21–32)
CREAT SERPL-MCNC: 0.7 MG/DL (ref 0.6–1.2)
DEPRECATED RDW RBC AUTO: 14.4 % (ref 12.4–15.4)
ECHO AO ROOT DIAM: 3.3 CM
ECHO AO ROOT INDEX: 1.88 CM/M2
ECHO AV AREA PEAK VELOCITY: 2.3 CM2
ECHO AV AREA VTI: 2.3 CM2
ECHO AV AREA/BSA PEAK VELOCITY: 1.3 CM2/M2
ECHO AV AREA/BSA VTI: 1.3 CM2/M2
ECHO AV MEAN GRADIENT: 5 MMHG
ECHO AV MEAN VELOCITY: 1 M/S
ECHO AV PEAK GRADIENT: 10 MMHG
ECHO AV PEAK VELOCITY: 1.6 M/S
ECHO AV VELOCITY RATIO: 0.81
ECHO AV VTI: 35.3 CM
ECHO BSA: 1.78 M2
ECHO LA AREA 2C: 24.8 CM2
ECHO LA AREA 4C: 24.5 CM2
ECHO LA MAJOR AXIS: 6 CM
ECHO LA MINOR AXIS: 6.1 CM
ECHO LA VOL BP: 82 ML (ref 22–52)
ECHO LA VOL MOD A2C: 82 ML (ref 22–52)
ECHO LA VOL MOD A4C: 82 ML (ref 22–52)
ECHO LA VOL/BSA BIPLANE: 47 ML/M2 (ref 16–34)
ECHO LA VOLUME INDEX MOD A2C: 47 ML/M2 (ref 16–34)
ECHO LA VOLUME INDEX MOD A4C: 47 ML/M2 (ref 16–34)
ECHO LV E' LATERAL VELOCITY: 8.24 CM/S
ECHO LV E' SEPTAL VELOCITY: 6.13 CM/S
ECHO LV EDV A2C: 99 ML
ECHO LV EDV A4C: 100 ML
ECHO LV EDV INDEX A4C: 57 ML/M2
ECHO LV EDV NDEX A2C: 56 ML/M2
ECHO LV EF PHYSICIAN: 55 %
ECHO LV EJECTION FRACTION A2C: 56 %
ECHO LV EJECTION FRACTION A4C: 61 %
ECHO LV EJECTION FRACTION BIPLANE: 59 % (ref 55–100)
ECHO LV ESV A2C: 44 ML
ECHO LV ESV A4C: 39 ML
ECHO LV ESV INDEX A2C: 25 ML/M2
ECHO LV ESV INDEX A4C: 22 ML/M2
ECHO LV FRACTIONAL SHORTENING: 33 % (ref 28–44)
ECHO LV INTERNAL DIMENSION DIASTOLE INDEX: 2.44 CM/M2
ECHO LV INTERNAL DIMENSION DIASTOLIC: 4.3 CM (ref 3.9–5.3)
ECHO LV INTERNAL DIMENSION SYSTOLIC INDEX: 1.65 CM/M2
ECHO LV INTERNAL DIMENSION SYSTOLIC: 2.9 CM
ECHO LV IVSD: 0.9 CM (ref 0.6–0.9)
ECHO LV MASS 2D: 132.7 G (ref 67–162)
ECHO LV MASS INDEX 2D: 75.4 G/M2 (ref 43–95)
ECHO LV POSTERIOR WALL DIASTOLIC: 1 CM (ref 0.6–0.9)
ECHO LV RELATIVE WALL THICKNESS RATIO: 0.47
ECHO LVOT AREA: 2.8 CM2
ECHO LVOT AV VTI INDEX: 0.82
ECHO LVOT DIAM: 1.9 CM
ECHO LVOT MEAN GRADIENT: 3 MMHG
ECHO LVOT PEAK GRADIENT: 6 MMHG
ECHO LVOT PEAK VELOCITY: 1.3 M/S
ECHO LVOT STROKE VOLUME INDEX: 46.9 ML/M2
ECHO LVOT SV: 82.5 ML
ECHO LVOT VTI: 29.1 CM
ECHO MV A VELOCITY: 0.83 M/S
ECHO MV E DECELERATION TIME (DT): 288 MS
ECHO MV E VELOCITY: 0.88 M/S
ECHO MV E/A RATIO: 1.06
ECHO MV E/E' LATERAL: 10.68
ECHO MV E/E' RATIO (AVERAGED): 12.52
ECHO MV E/E' SEPTAL: 14.36
ECHO PV MAX VELOCITY: 0.7 M/S
ECHO PV PEAK GRADIENT: 2 MMHG
ECHO RA AREA 4C: 18.1 CM2
ECHO RA END SYSTOLIC VOLUME APICAL 4 CHAMBER INDEX BSA: 24 ML/M2
ECHO RA VOLUME: 43 ML
ECHO RV TAPSE: 3 CM (ref 1.7–?)
ECHO TV ALIASING VELOCITY (NYQUIST): 39 CM/S
ECHO TV REGURGITANT MAX VELOCITY: 2.44 M/S
ECHO TV REGURGITANT PEAK GRADIENT: 24 MMHG
ECHO TV REGURGITANT RADIUS PISA: 0.7 CM
ECHO TV REGURGITANT VTI: 76.5 CM
EOSINOPHIL # BLD: 0.1 K/UL (ref 0–0.6)
EOSINOPHIL NFR BLD: 2.5 %
GFR SERPLBLD CREATININE-BSD FMLA CKD-EPI: 89 ML/MIN/{1.73_M2}
GLUCOSE SERPL-MCNC: 98 MG/DL (ref 70–99)
HCT VFR BLD AUTO: 30.3 % (ref 36–48)
HGB BLD-MCNC: 10.2 G/DL (ref 12–16)
LYMPHOCYTES # BLD: 1.8 K/UL (ref 1–5.1)
LYMPHOCYTES NFR BLD: 29.9 %
MCH RBC QN AUTO: 31.2 PG (ref 26–34)
MCHC RBC AUTO-ENTMCNC: 33.9 G/DL (ref 31–36)
MCV RBC AUTO: 92 FL (ref 80–100)
MONOCYTES # BLD: 0.9 K/UL (ref 0–1.3)
MONOCYTES NFR BLD: 14.9 %
NEUTROPHILS # BLD: 3.1 K/UL (ref 1.7–7.7)
NEUTROPHILS NFR BLD: 51.3 %
PLATELET # BLD AUTO: 244 K/UL (ref 135–450)
PMV BLD AUTO: 8.3 FL (ref 5–10.5)
POTASSIUM SERPL-SCNC: 3.8 MMOL/L (ref 3.5–5.1)
RBC # BLD AUTO: 3.29 M/UL (ref 4–5.2)
SODIUM SERPL-SCNC: 138 MMOL/L (ref 136–145)
TSH SERPL DL<=0.005 MIU/L-ACNC: 1.66 UIU/ML (ref 0.27–4.2)
TSH SERPL DL<=0.005 MIU/L-ACNC: 1.95 UIU/ML (ref 0.27–4.2)
VAS LEFT CCA DIST EDV: 22.3 CM/S
VAS LEFT CCA DIST PSV: 78.4 CM/S
VAS LEFT CCA MID EDV: 22.3 CM/S
VAS LEFT CCA MID PSV: 105 CM/S
VAS LEFT CCA PROX EDV: 17.4 CM/S
VAS LEFT CCA PROX PSV: 93.9 CM/S
VAS LEFT ECA EDV: 13.6 CM/S
VAS LEFT ECA PSV: 79.4 CM/S
VAS LEFT ICA DIST EDV: 31.8 CM/S
VAS LEFT ICA DIST PSV: 115 CM/S
VAS LEFT ICA MID EDV: 25.5 CM/S
VAS LEFT ICA MID PSV: 71.5 CM/S
VAS LEFT ICA PROX EDV: 18.7 CM/S
VAS LEFT ICA PROX PSV: 51 CM/S
VAS LEFT ICA/CCA PSV: 1.1
VAS LEFT SUBCLAVIAN PROX PSV: 114 CM/S
VAS LEFT VERTEBRAL EDV: 16.5 CM/S
VAS LEFT VERTEBRAL PSV: 43.9 CM/S
VAS RIGHT CCA DIST EDV: 22.5 CM/S
VAS RIGHT CCA DIST PSV: 77.1 CM/S
VAS RIGHT CCA MID EDV: 19.1 CM/S
VAS RIGHT CCA MID PSV: 84.9 CM/S
VAS RIGHT CCA PROX EDV: 23.4 CM/S
VAS RIGHT CCA PROX PSV: 126 CM/S
VAS RIGHT ECA EDV: 8.72 CM/S
VAS RIGHT ECA PSV: 55.6 CM/S
VAS RIGHT ICA DIST EDV: 24.3 CM/S
VAS RIGHT ICA DIST PSV: 78.4 CM/S
VAS RIGHT ICA MID EDV: 22.5 CM/S
VAS RIGHT ICA MID PSV: 76.2 CM/S
VAS RIGHT ICA PROX EDV: 15.4 CM/S
VAS RIGHT ICA PROX PSV: 65.9 CM/S
VAS RIGHT ICA/CCA PSV: 0.92
VAS RIGHT SUBCLAVIAN PROX PSV: 142 CM/S
VAS RIGHT VERTEBRAL EDV: 13.3 CM/S
VAS RIGHT VERTEBRAL PSV: 61.9 CM/S
WBC # BLD AUTO: 6 K/UL (ref 4–11)

## 2025-03-03 PROCEDURE — 93306 TTE W/DOPPLER COMPLETE: CPT

## 2025-03-03 PROCEDURE — 93880 EXTRACRANIAL BILAT STUDY: CPT | Performed by: INTERNAL MEDICINE

## 2025-03-03 PROCEDURE — 36415 COLL VENOUS BLD VENIPUNCTURE: CPT

## 2025-03-03 PROCEDURE — 6360000002 HC RX W HCPCS: Performed by: STUDENT IN AN ORGANIZED HEALTH CARE EDUCATION/TRAINING PROGRAM

## 2025-03-03 PROCEDURE — 84443 ASSAY THYROID STIM HORMONE: CPT

## 2025-03-03 PROCEDURE — 85025 COMPLETE CBC W/AUTO DIFF WBC: CPT

## 2025-03-03 PROCEDURE — 80048 BASIC METABOLIC PNL TOTAL CA: CPT

## 2025-03-03 PROCEDURE — 93880 EXTRACRANIAL BILAT STUDY: CPT

## 2025-03-03 PROCEDURE — 2500000003 HC RX 250 WO HCPCS: Performed by: STUDENT IN AN ORGANIZED HEALTH CARE EDUCATION/TRAINING PROGRAM

## 2025-03-03 PROCEDURE — 6370000000 HC RX 637 (ALT 250 FOR IP): Performed by: STUDENT IN AN ORGANIZED HEALTH CARE EDUCATION/TRAINING PROGRAM

## 2025-03-03 PROCEDURE — 93306 TTE W/DOPPLER COMPLETE: CPT | Performed by: INTERNAL MEDICINE

## 2025-03-03 RX ADMIN — BUPROPION HYDROCHLORIDE 200 MG: 100 TABLET, FILM COATED, EXTENDED RELEASE ORAL at 09:05

## 2025-03-03 RX ADMIN — ENOXAPARIN SODIUM 40 MG: 100 INJECTION SUBCUTANEOUS at 08:55

## 2025-03-03 RX ADMIN — MEMANTINE 5 MG: 5 TABLET ORAL at 08:56

## 2025-03-03 RX ADMIN — PANTOPRAZOLE SODIUM 40 MG: 40 TABLET, DELAYED RELEASE ORAL at 08:56

## 2025-03-03 RX ADMIN — SODIUM CHLORIDE, PRESERVATIVE FREE 10 ML: 5 INJECTION INTRAVENOUS at 08:39

## 2025-03-03 RX ADMIN — BUSPIRONE HYDROCHLORIDE 5 MG: 5 TABLET ORAL at 08:56

## 2025-03-03 ASSESSMENT — PAIN SCALES - GENERAL: PAINLEVEL_OUTOF10: 0

## 2025-03-03 NOTE — DISCHARGE SUMMARY
signed by Fred Acevedo DO    XR CHEST PORTABLE    Result Date: 3/1/2025  History: Syncope. AP chest COMPARISON: 3/27/2023. FINDINGS: Right upper chest stimulator device with lead extending into the neck. Lungs are clear without effusion or consolidation. No acute osseous abnormality.     1. No acute abnormality. Electronically signed by Augustin Cox MD      Consults:     IP CONSULT TO CARDIOLOGY    Labs:     Recent Labs     03/01/25  1217 03/02/25  0711 03/03/25  0609   WBC 6.6 6.5 6.0   HGB 11.3* 10.9* 10.2*   HCT 33.5* 31.9* 30.3*    258 244     Recent Labs     03/01/25  1217 03/02/25  0711 03/03/25  0609   * 138 138   K 3.5 3.9 3.8   CL 99 103 103   CO2 21 25 25   BUN 18 14 18   CREATININE 0.8 0.7 0.7   CALCIUM 9.4 9.2 9.1     Recent Labs     03/01/25  1217 03/01/25  1339   TROPHS 14 12     No results for input(s): \"LABA1C\" in the last 72 hours.  Recent Labs     03/01/25  1217   AST 20   ALT 16   BILITOT 0.6   ALKPHOS 93     No results for input(s): \"INR\", \"LACTA\", \"TSH\" in the last 72 hours.    Urine Cultures:   Lab Results   Component Value Date/Time    LABURIN  06/02/2024 03:22 AM     <10,000 CFU/ml mixed skin/urogenital giancarlo. No further workup    LABURIN  06/02/2024 03:22 AM     25,000 CFU/ml  Susceptibility testing of penicillin and other beta lactams is  not necessary for beta hemolytic Streptococci since resistant  strains have not been identified. (CLSI M100)       Blood Cultures: No results found for: \"BC\"  No results found for: \"BLOODCULT2\"  Organism:   Lab Results   Component Value Date/Time    ORG Strep agalactiae (Beta Strep Group B) 06/02/2024 03:22 AM       Signed:    Claudette Diallo, APRN - CNP

## 2025-03-03 NOTE — CARE COORDINATION
Chart reviewed day 2. Care provided by cards and IM. Pt is from home with a friend. She is IPTA. Pt had echo and carotid US today. Both are unremarkable. Pt will likely have NN. Will continue to follow course for needs. May eJnsen RN   
Potential DME:    Patient expects to discharge to: House  Plan for transportation at discharge:      Financial    Payor: [facility] MEDICARE / Plan: [facility] MEDICARE ADVANTAGE / Product Type: *No Product type* /     Does insurance require precert for SNF: Yes    Potential assistance Purchasing Medications:    Meds-to-Beds request:        [id number] - [place], [place] - [address] - P [phone] - F [phone]  [address]  [place] [address]  Phone: [phone] Fax: [phone]    [facility] - [place], [place] - [address] [address] - P [phone] - F [phone]  [address]  [place] [address]  Phone: [phone] Fax: [phone]      Notes:    Factors facilitating achievement of predicted outcomes: Family support, Friend support, Motivated, Cooperative, Pleasant, Sense of humor, and Good insight into deficits    Barriers to discharge: none    Additional Case Management Notes: spoke with patient. Lives in 2 story [facility] with Zeb. Her bedroom is on 2nd floor. Uses no DME, IADL's. Friend provides transportation. Cards echo and carotid US pending. CM will follow clinical course. Benjie Monzon RN      The Plan for Transition of Care is related to the following treatment goals of Syncope and collapse [R55]  Bradycardia [R00.1]  Syncope, unspecified syncope type [R55]    IF APPLICABLE: The Patient and/or patient representative Alba and her family were provided with a choice of provider and agrees with the discharge plan. Freedom of choice list with basic dialogue that supports the patient's individualized plan of care/goals and shares the quality data associated with the providers was provided to:     Patient Representative Name:       The Patient and/or Patient Representative Agree with the Discharge Plan?      Benjie Monzon RN  Case Management Department

## 2025-03-03 NOTE — TELEPHONE ENCOUNTER
Monitor company [employer]  Length of monitor 30 days  Monitor ordered by Claudette Diallo   Patch ID [id number]  Device number [id number]  Monitor given to RN  Nurse to apply at the time of discharge.

## 2025-03-03 NOTE — PROGRESS NOTES
Event monitor in place, explained/educated pt and family regarding monitor. All questions answered appropriately. Patient discharged in care of son, preferred to ambulate to lobby with son. Patient alert/oriented, no c/o noted upon discharge.   
[facility]   Daily Progress Note    Admit Date:  3/1/2025  HPI:    Chief Complaint   Patient presents with    Loss of Consciousness     While eating at [employer]    Nausea      Alba Abdi presented with syncope while eating breakfast    Cardiology consulted for syncope, bradycardia    PMH: Dementia, GERD, MICHAEL, depression    Subjective:  Ms. Abdi sitting up on side of bed, feels great.  Denies any lightheadedness/ dizziness or near syncopal spells.    Family members at bedside.     Objective:   Patient Vitals for the past 24 hrs:   BP Temp Temp src Pulse Resp SpO2   03/03/25 0854 133/77 97.3 °F (36.3 °C) -- 50 -- 99 %   03/03/25 0315 135/73 98 °F (36.7 °C) Oral 68 16 95 %   03/03/25 0130 130/75 98.1 °F (36.7 °C) Oral 55 16 99 %   03/02/25 1945 132/80 97.5 °F (36.4 °C) Oral 64 16 100 %   03/02/25 1527 121/62 98.1 °F (36.7 °C) Oral 58 16 97 %   03/02/25 1156 (!) 150/73 98.2 °F (36.8 °C) Oral 58 16 98 %       Intake/Output Summary (Last 24 hours) at 3/3/2025 0944  Last data filed at 3/3/2025 0932  Gross per 24 hour   Intake 960 ml   Output --   Net 960 ml     Wt Readings from Last 3 Encounters:   03/01/25 68.9 kg (152 lb)   07/23/24 67.1 kg (148 lb)   03/27/23 77.1 kg (170 lb)     3/1/2025, 1300     BP Lying 119/67    Pulse Lying 46    BP Sitting 129/66    Pulse Sitting 54    BP Standing 134/64    Pulse Standing 50         ASSESSMENT:   Syncope: unclear etiology  Bradycardia: Heart rate increases appropriately with exertion  Systolic murmur: + aortic sclerosis/ calcification on echo  HYPERTENSION: On amlodipine 5 mg daily-currently on hold, orthostatic vital signs negative, BP stable off amlodipine  Mild AI, moderate TR  Dementia  GERD  MICHAEL    PLAN:  Echocardiogram - unremarkable  30-day event monitor at discharge  Carotid ultrasound without significant disease  TSH normal  Continue to hold the amlodipine at discharge.  Monitor BP at home.  If SBP > 150, okay to take amlodipine 2.5 mg 
reviewed and interpreted     [x] Discussion (any 1)  [x] Multi-Disciplinary Rounds with Case Management  [] Discussed management of the case with           Labs:  Personally reviewed on 3/2/2025 and interpreted for clinical significance as documented above.     Recent Labs     03/01/25  1217 03/02/25  0711   WBC 6.6 6.5   HGB 11.3* 10.9*   HCT 33.5* 31.9*    258     Recent Labs     03/01/25  1217 03/02/25  0711   * 138   K 3.5 3.9   CL 99 103   CO2 21 25   BUN 18 14   CREATININE 0.8 0.7   CALCIUM 9.4 9.2     Recent Labs     03/01/25  1217 03/01/25  1339   TROPHS 14 12     No results for input(s): \"LABA1C\" in the last 72 hours.  Recent Labs     03/01/25  1217   AST 20   ALT 16   BILITOT 0.6   ALKPHOS 93     No results for input(s): \"INR\", \"LACTA\", \"TSH\" in the last 72 hours.    Urine Cultures:   Lab Results   Component Value Date/Time    LABURIN  06/02/2024 03:22 AM     <10,000 CFU/ml mixed skin/urogenital giancarlo. No further workup    LABURIN  06/02/2024 03:22 AM     25,000 CFU/ml  Susceptibility testing of penicillin and other beta lactams is  not necessary for beta hemolytic Streptococci since resistant  strains have not been identified. (CLSI M100)       Blood Cultures: No results found for: \"BC\"  No results found for: \"BLOODCULT2\"  Organism:   Lab Results   Component Value Date/Time    ORG Strep agalactiae (Beta Strep Group B) 06/02/2024 03:22 AM         Claudette Diallo, APRN - CNP   
CFU/ml  Susceptibility testing of penicillin and other beta lactams is  not necessary for beta hemolytic Streptococci since resistant  strains have not been identified. (CLSI M100)       Blood Cultures: No results found for: \"BC\"  No results found for: \"BLOODCULT2\"  Organism:   Lab Results   Component Value Date/Time    ORG Strep agalactiae (Beta Strep Group B) 06/02/2024 03:22 AM         Claudette Diallo, KARY - CNP

## 2025-03-03 NOTE — DISCHARGE INSTRUCTIONS
Make sure to drink  8 - 8 ounce glasses of water daily to stay hydrated. Dehydration can cause lightheadedness and dizziness.     Monitor BP at home.  If SBP > 150, okay to take amlodipine 2.5 mg (half dose).

## 2025-03-03 NOTE — PLAN OF CARE
Problem: Discharge Planning  Goal: Discharge to home or other facility with appropriate resources  Outcome: Progressing     Problem: Pain  Goal: Verbalizes/displays adequate comfort level or baseline comfort level  Outcome: Progressing     Problem: Safety - Adult  Goal: Free from fall injury  Outcome: Progressing     Problem: Neurosensory - Adult  Goal: Achieves stable or improved neurological status  Outcome: Progressing  Goal: Achieves maximal functionality and self care  Outcome: Progressing     Problem: Cardiovascular - Adult  Goal: Maintains optimal cardiac output and hemodynamic stability  Outcome: Progressing  Goal: Absence of cardiac dysrhythmias or at baseline  Outcome: Progressing

## 2025-03-03 NOTE — PLAN OF CARE
Problem: Discharge Planning  Goal: Discharge to home or other facility with appropriate resources  3/3/2025 1452 by Fatuma Ayala RN  Outcome: Completed  3/3/2025 0911 by Fatuma Ayala RN  Outcome: Progressing  Flowsheets (Taken 3/3/2025 0854)  Discharge to home or other facility with appropriate resources:   Arrange for needed discharge resources and transportation as appropriate   Identify barriers to discharge with patient and caregiver     Problem: Pain  Goal: Verbalizes/displays adequate comfort level or baseline comfort level  3/3/2025 1452 by Fatuma Ayala RN  Outcome: Completed  3/3/2025 0911 by Fatuma Ayala RN  Outcome: Progressing     Problem: Safety - Adult  Goal: Free from fall injury  3/3/2025 1452 by Fatuma Ayala RN  Outcome: Completed  3/3/2025 0911 by Fatuma Ayala RN  Outcome: Progressing     Problem: Neurosensory - Adult  Goal: Achieves stable or improved neurological status  3/3/2025 1452 by Fatuma Ayala RN  Outcome: Completed  3/3/2025 0911 by Fatuma Ayala RN  Outcome: Progressing  Flowsheets (Taken 3/3/2025 0854)  Achieves stable or improved neurological status:   Assess for and report changes in neurological status   Maintain blood pressure and fluid volume within ordered parameters to optimize cerebral perfusion and minimize risk of hemorrhage   Monitor temperature, glucose, and sodium. Initiate appropriate interventions as ordered  Goal: Achieves maximal functionality and self care  3/3/2025 1452 by Fatuma Ayala RN  Outcome: Completed  3/3/2025 0911 by Fatuma Ayala RN  Outcome: Progressing  Flowsheets (Taken 3/3/2025 0854)  Achieves maximal functionality and self care: Monitor swallowing and airway patency with patient fatigue and changes in neurological status     Problem: Cardiovascular - Adult  Goal: Maintains optimal cardiac output and hemodynamic stability  3/3/2025 1452 by Fatuma Ayala RN  Outcome: Completed  3/3/2025 0911 by Fatuma Ayala RN  Outcome:

## 2025-03-03 NOTE — DISCHARGE INSTR - COC
Continuity of Care Form    Patient Name: Alba Abdi   :  1945  MRN:  [id number]    Admit date:  3/1/2025  Discharge date:  ***    Code Status Order: Full Code   Advance Directives:   Advance Care Flowsheet Documentation             Admitting Physician:  Jonathan Miles DO  PCP: Sylvia Lewis DO    Discharging Nurse: ***  Discharging Hospital Unit/Room#: 0367/0367-01  Discharging Unit Phone Number: ***    Emergency Contact:   Extended Emergency Contact Information  Primary Emergency Contact: Eusebio Abdi  Home Phone: [phone]  Mobile Phone: [phone]  Relation: Child  Secondary Emergency Contact: Dallas Abdi  Home Phone: [phone]  Relation: Child    Past Surgical History:  Past Surgical History:   Procedure Laterality Date    LARYNGOSCOPY N/A 10/10/2022    DRUG INDUCED SLEEP ENDOSCOPY performed by Jenny Palumbo DO at [facility] OR    STIMULATOR SURGERY N/A 2022    INSPIRE DEVICE PLACEMENT performed by Jenny Palumbo DO at [facility] OR    TONSILLECTOMY         Immunization History:   Immunization History   Administered Date(s) Administered    COVID-19, MODERNA BLUE border, Primary or Immunocompromised, (age 12y+), IM, 100 mcg/0.5mL 2021, 2021    COVID-19, MODERNA Bivalent, (age 12y+), IM, 50 mcg/0.5 mL 10/08/2022    COVID-19, MODERNA Booster BLUE border, (age 18y+), IM, 50mcg/0.25mL 2021       Active Problems:  Patient Active Problem List   Diagnosis Code    Obstructive sleep apnea (adult) (pediatric) G47.33    Depressive disorder F32.A    Dementia with behavioral disturbance (HCC) F03.918    Mitral valve insufficiency I34.0    Syncope and collapse R55    Bradycardia R00.1    Primary hypertension I10    Nonrheumatic aortic valve insufficiency I35.1       Isolation/Infection:   Isolation            No Isolation          Patient Infection Status       Infection Onset Added Last Indicated Last Indicated By Review Planned Expiration Resolved Resolved By    None active

## 2025-04-09 LAB — ECHO BSA: 1.78 M2

## 2025-04-10 NOTE — PROGRESS NOTES
CARDIOLOGY HOSPITAL FOLLOW UP        Patient Name: Alba Abdi  Primary Care physician: Sylvia Lewis DO    Reason for Referral/Chief Complaint: Alba Abdi is a 79 y.o. patient who presents today for a hospital follow up.    History of Present Illness:   Alba Abdi is a very pleasant 79 y.o. female with a pmhx of mild carotid artery disease, syncope dementia, GERD, MICHAEL status post inspire and depression. She was admitted 3/1/25 with syncopal episodes. Echo EF of 55-60%, diastolic dysfunction, mild AR. Moderate TR. Carotid duplex mild stenosis bilaterally. D/C with a cardiac event monitor.    Patient wore a cardiac event monitor from 3/3 to 3/29/25 which demonstrated predominately sinus rhythm with an average HR of 61 () BPM. NSVT noted (up to 5 beats).  Personally reviewed, the NSVT had some irregularity, doubt NSVT.    Today, she is doing well. She is still walking every day up to two miles a day. She said her short-term memory is poor. Her son agrees and that she is being treated for this. He said it is more short term memory loss.  Since discharge, no further episodes of syncope or near syncope.  Blood pressure has been well-controlled off amlodipine    The patient denies chest pain, shortness of breath at rest and dyspnea with exertion. Denies palpitations, dizziness, near-syncope or eileen syncope. Denies paroxysmal nocturnal dyspnea, orthopnea, bendopnea, increasing lower extremity edema or weight gain.      Past Medical History:   has a past medical history of Depression, GERD (gastroesophageal reflux disease), and Sleep apnea.    Surgical History:   has a past surgical history that includes Tonsillectomy; laryngoscopy (N/A, 10/10/2022); and Stimulator Surgery (N/A, 12/12/2022).     Social History:   reports that she has never smoked. She has never used smokeless tobacco. She reports that she does not currently use alcohol after a past usage of about 3.0 standard drinks of

## 2025-04-11 ENCOUNTER — OFFICE VISIT (OUTPATIENT)
Dept: CARDIOLOGY CLINIC | Age: 80
End: 2025-04-11

## 2025-04-11 VITALS
DIASTOLIC BLOOD PRESSURE: 72 MMHG | BODY MASS INDEX: 25.99 KG/M2 | WEIGHT: 156 LBS | HEART RATE: 64 BPM | SYSTOLIC BLOOD PRESSURE: 124 MMHG | HEIGHT: 65 IN | OXYGEN SATURATION: 99 %

## 2025-04-11 DIAGNOSIS — Z79.899 MEDICATION MANAGEMENT: ICD-10-CM

## 2025-04-11 DIAGNOSIS — I49.1 RUN OF ATRIAL PREMATURE COMPLEXES: ICD-10-CM

## 2025-04-11 DIAGNOSIS — R55 SYNCOPE AND COLLAPSE: ICD-10-CM

## 2025-04-11 DIAGNOSIS — I77.9 MILD CAROTID ARTERY DISEASE: ICD-10-CM

## 2025-04-11 DIAGNOSIS — F03.918 DEMENTIA WITH BEHAVIORAL DISTURBANCE (HCC): ICD-10-CM

## 2025-04-11 DIAGNOSIS — I47.29 NSVT (NONSUSTAINED VENTRICULAR TACHYCARDIA) (HCC): ICD-10-CM

## 2025-04-11 DIAGNOSIS — I10 PRIMARY HYPERTENSION: ICD-10-CM

## 2025-04-11 DIAGNOSIS — I34.0 MILD MITRAL REGURGITATION: Primary | ICD-10-CM

## 2025-04-11 DIAGNOSIS — G47.33 OBSTRUCTIVE SLEEP APNEA (ADULT) (PEDIATRIC): ICD-10-CM

## 2025-04-11 DIAGNOSIS — R01.1 MURMUR: ICD-10-CM

## 2025-04-11 DIAGNOSIS — I35.1 NONRHEUMATIC AORTIC VALVE INSUFFICIENCY: ICD-10-CM

## 2025-04-11 DIAGNOSIS — K21.00 GASTROESOPHAGEAL REFLUX DISEASE WITH ESOPHAGITIS WITHOUT HEMORRHAGE: ICD-10-CM

## 2025-04-11 DIAGNOSIS — R00.1 BRADYCARDIA: ICD-10-CM

## 2025-04-11 RX ORDER — ROSUVASTATIN CALCIUM 5 MG/1
5 TABLET, COATED ORAL DAILY
Qty: 90 TABLET | Refills: 3 | Status: SHIPPED | OUTPATIENT
Start: 2025-04-11

## 2025-04-11 NOTE — PATIENT INSTRUCTIONS
Monitor your blood pressure and heart rate at home twice a day, morning and night. Recommend a monitor for your bicep.  Goal 130/80 or less. Also call if BP is low (<100 systolic) or if you are having dizziness/fatigue.   Please obtain the following labs; -LIPID FASTING  Start taking rosuvastatin 5mg daily. Patient verbalizes understanding of the need for treatment and education provided at today's visit. Additional education materials will be provided in the AVS.

## 2025-07-22 ENCOUNTER — OFFICE VISIT (OUTPATIENT)
Dept: PULMONOLOGY | Age: 80
End: 2025-07-22
Payer: MEDICARE

## 2025-07-22 VITALS
BODY MASS INDEX: 25.63 KG/M2 | TEMPERATURE: 96.8 F | SYSTOLIC BLOOD PRESSURE: 124 MMHG | RESPIRATION RATE: 16 BRPM | OXYGEN SATURATION: 97 % | HEART RATE: 54 BPM | DIASTOLIC BLOOD PRESSURE: 79 MMHG | WEIGHT: 154 LBS

## 2025-07-22 DIAGNOSIS — G47.33 OBSTRUCTIVE SLEEP APNEA (ADULT) (PEDIATRIC): Primary | ICD-10-CM

## 2025-07-22 DIAGNOSIS — Z96.82 S/P INSERTION OF HYPOGLOSSAL NERVE STIMULATOR: ICD-10-CM

## 2025-07-22 PROCEDURE — 99213 OFFICE O/P EST LOW 20 MIN: CPT | Performed by: INTERNAL MEDICINE

## 2025-07-22 PROCEDURE — 1159F MED LIST DOCD IN RCRD: CPT | Performed by: INTERNAL MEDICINE

## 2025-07-22 PROCEDURE — 1123F ACP DISCUSS/DSCN MKR DOCD: CPT | Performed by: INTERNAL MEDICINE

## 2025-07-22 PROCEDURE — 3074F SYST BP LT 130 MM HG: CPT | Performed by: INTERNAL MEDICINE

## 2025-07-22 PROCEDURE — 3078F DIAST BP <80 MM HG: CPT | Performed by: INTERNAL MEDICINE

## 2025-07-22 ASSESSMENT — SLEEP AND FATIGUE QUESTIONNAIRES
ESS TOTAL SCORE: 3
HOW LIKELY ARE YOU TO NOD OFF OR FALL ASLEEP WHILE SITTING AND READING: WOULD NEVER DOZE
HOW LIKELY ARE YOU TO NOD OFF OR FALL ASLEEP WHILE LYING DOWN TO REST IN THE AFTERNOON WHEN CIRCUMSTANCES PERMIT: HIGH CHANCE OF DOZING
HOW LIKELY ARE YOU TO NOD OFF OR FALL ASLEEP WHILE WATCHING TV: WOULD NEVER DOZE
HOW LIKELY ARE YOU TO NOD OFF OR FALL ASLEEP WHILE SITTING INACTIVE IN A PUBLIC PLACE: WOULD NEVER DOZE
HOW LIKELY ARE YOU TO NOD OFF OR FALL ASLEEP IN A CAR, WHILE STOPPED FOR A FEW MINUTES IN TRAFFIC: WOULD NEVER DOZE
HOW LIKELY ARE YOU TO NOD OFF OR FALL ASLEEP WHILE SITTING AND TALKING TO SOMEONE: WOULD NEVER DOZE
HOW LIKELY ARE YOU TO NOD OFF OR FALL ASLEEP WHILE SITTING QUIETLY AFTER LUNCH WITHOUT ALCOHOL: WOULD NEVER DOZE
HOW LIKELY ARE YOU TO NOD OFF OR FALL ASLEEP WHEN YOU ARE A PASSENGER IN A CAR FOR AN HOUR WITHOUT A BREAK: WOULD NEVER DOZE

## 2025-07-22 NOTE — PATIENT INSTRUCTIONS
Please remember to bring a list of medications and any CPAP or BiPAP machines to your next appointment with the office.     Out of respect for other patients and providers, we ask that you arrive no later than your scheduled appointment time.  If you arrive later than your appointment time, you may be asked to reschedule your appointment.     Late cancellations result in other patients being unable to utilize the appointment slot to see their physician.  Please avoid cancelling your appointment less than 1 week prior to the appointment date.  Patients with multiple missed appointments within 2 years may be dismissed from the practice.     In the next few weeks, you will be receiving a survey from [facility] regarding your visit today.  Your input is valuable to us & surveys are regularly reviewed by [facility] leadership.  It is very important to us that our patients receive excellent care.  If your experience was excellent, please let us know!  If your experience was not a good one, please tell us so we can make needed corrections. We hope you are comfortable recommending us to others for their healthcare needs.     We thank you for choosing [facility]!

## 2025-07-22 NOTE — PROGRESS NOTES
MA Communication:  The following orders are received by verbal communication from Boy Lyon MD        Orders include:        Follow up 1 year use when caregiver is home

## 2025-07-22 NOTE — PROGRESS NOTES
Sleep Medicine Follow-up Note, Kirti     Chief Complaint: MICHAEL, Inspire follow up     Interval history:  here for Inspire f/u 25  Neurostimulator programming incomin V L3, 1.8-2.2 H43 60/15/8; she is not using Inspire.  Use limited by dementia.      Presenting history: Wesly Patient  Inspire implantation 22    Initial visit for activation 2023  Sensing voltage:  1.2 volts  Starting amplitude: 1.4 volts with the default [+ - +] electrode configuration.    F/u on 23  Incoming Amplitude: 2.0 volts with the default [+ - +] electrode configuration.  Time usage: 61 Hours per week    Inspire titration sleep study done 5/10/2023 AHI of 17, 1.8 V     Follow-up on 2023-for inspire  Incoming Amplitude: 2.0 volts with the default [+ - +] electrode configuration.  Time usage: 50 Hours per week  Outgoing Control Range:  Lower Limit: 1.8 volts  Upper Limit: 2.2 volts    Level 3 at 2.0 volts is controlling the sleep apnea    Follow-up on 10/31/2023  Incoming Amplitude: 2.0 volts with the default [+ - +] electrode configuration.  Time usage: 19 Hours per week    Follow-up on 2024  Incoming Amplitude: 2.0 volts with the default [+ - +] electrode configuration.  Time usage: 45 Hours per week     reports that she has never smoked. She has never used smokeless tobacco.     Review of Systems:      2025     1:04 PM 2024     1:11 PM   Sleep Medicine   Sitting and reading 0 0   Watching TV 0 0   Sitting, inactive in a public place (e.g. a theatre or a meeting) 0 1   As a passenger in a car for an hour without a break 0 3   Lying down to rest in the afternoon when circumstances permit 3 3   Sitting and talking to someone 0 0   Sitting quietly after a lunch without alcohol 0 0   In a car, while stopped for a few minutes in traffic 0 0   [place] Sleepiness Score 3 7   Neck (Inches)  13       Physical Exam:  Blood pressure 124/79, pulse 54, temperature 96.8 °F (36 °C), temperature source Temporal,

## (undated) DEVICE — NEEDLE FLTR 19GA L1.5IN WALL THK5UM BRN POLYPR HUB S STL

## (undated) DEVICE — STANDARD HYPODERMIC NEEDLE,POLYPROPYLENE HUB: Brand: MONOJECT

## (undated) DEVICE — SKIN MARKER,REGULAR TIP WITH RULER AND LABELS: Brand: DEVON

## (undated) DEVICE — CONTAINER,SPECIMEN,OR STERILE,4OZ: Brand: MEDLINE

## (undated) DEVICE — LOOP VES RADIOPAQUE MAXI 406X1 MM SIL BLU STRL STERION LF

## (undated) DEVICE — SOLUTION IRRIG 500ML 0.9% SOD CHL USP POUR PLAS BTL

## (undated) DEVICE — SHEARS ENDOSCP L9CM CRV HARM FOCS +

## (undated) DEVICE — HEAD AND NECK PACK: Brand: CONVERTORS

## (undated) DEVICE — 3M™ STERI-DRAPE™ INSTRUMENT POUCH 1018: Brand: STERI-DRAPE™

## (undated) DEVICE — TOWEL,OR,DSP,ST,BLUE,STD,8/PK,10PK/CS: Brand: MEDLINE

## (undated) DEVICE — ELECTRODE PT RET AD L9FT HI MOIST COND ADH HYDRGEL CORDED

## (undated) DEVICE — PENCIL ES CRD L10FT HND SWCHING ROCK SWCH W/ EDGE COAT BLDE

## (undated) DEVICE — PROBE 8225401 5PK SD-SD BIPOL STIM ROHS

## (undated) DEVICE — NEEDLE HYPO 25GA L1.5IN BLU POLYPR HUB S STL REG BVL STR

## (undated) DEVICE — SYRINGE MED 10ML LUERLOCK TIP W/O SFTY DISP

## (undated) DEVICE — BLADE, TONGUE, 6", STERILE: Brand: MEDLINE

## (undated) DEVICE — 3M™ IOBAN™ 2 ANTIMICROBIAL INCISE DRAPE 6650EZ: Brand: IOBAN™ 2

## (undated) DEVICE — MINOR SET UP PACK: Brand: MEDLINE INDUSTRIES, INC.

## (undated) DEVICE — SUTURE PERMAHAND SZ 2-0 L18IN NONABSORBABLE BLK L26MM SH C012D

## (undated) DEVICE — ADHESIVE SKIN CLSR 0.7ML TOP DERMBND ADV

## (undated) DEVICE — SUTURE MCRYL + SZ 4-0 L18IN ABSRB UD L19MM PS-2 3/8 CIR MCP496G

## (undated) DEVICE — SPONGE,DISSECTOR,K,XRAY,9/16"X1/4",STRL: Brand: MEDLINE

## (undated) DEVICE — ELECTRODE 8227304 5PK PRASS PR 18MM ROHS

## (undated) DEVICE — 3M™ STERI-DRAPE™ FLUOROSCOPE DRAPE, 10 PER CARTON / 4 CARTONS PER CASE, 1012: Brand: STERI-DRAPE™

## (undated) DEVICE — SUTURE PERMA-HAND SZ 3-0 L18IN NONABSORBABLE BLK RB-1 L17MM C053D

## (undated) DEVICE — SUTURE VCRL + SZ 3-0 L18IN ABSRB UD SH 1/2 CIR TAPERCUT NDL VCP864D

## (undated) DEVICE — MAGNETIC DRAPE: Brand: DEVON

## (undated) DEVICE — SUTURE NONABSORBABLE MONOFILAMENT 5-0 PS-2 18 IN BLK ETHILON 1666H

## (undated) DEVICE — CORD ES L12FT BPLR FRCP

## (undated) DEVICE — INTENDED TO BE USED TO OCCLUDE, RETRACT AND IDENTIFY ARTERIES, VEINS, TENDONS AND NERVES IN SURGICAL PROCEDURES: Brand: STERION®  VESSEL LOOP

## (undated) DEVICE — APPLICATOR MEDICATED 26 CC SOLUTION HI LT ORNG CHLORAPREP

## (undated) DEVICE — CATHETER IV 20 GAX1 IN N WNG KINK RESIST INSYT AUTOGRD

## (undated) DEVICE — CODMAN® SURGICAL PATTIES 1/2" X 3" (1.27CM X 7.62CM): Brand: CODMAN®

## (undated) DEVICE — TOWEL,OR,DSP,ST,WHITE,DLX,4/PK,20PK/CS: Brand: MEDLINE

## (undated) DEVICE — REMOTE SLEEP PATIENT CONTROL

## (undated) DEVICE — TUBING, SUCTION, 3/16" X 12', STRAIGHT: Brand: MEDLINE

## (undated) DEVICE — PROVE COVER: Brand: UNBRANDED

## (undated) DEVICE — BLADE ES ELASTOMERIC COAT INSUL DURABLE BEND UPTO 90DEG

## (undated) DEVICE — SYRINGE IRRIG 60ML SFT PLIABLE BLB EZ TO GRP 1 HND USE W/

## (undated) DEVICE — GLOVE SURG SZ 6 THK91MIL LTX FREE SYN POLYISOPRENE ANTI